# Patient Record
Sex: FEMALE | Race: AMERICAN INDIAN OR ALASKA NATIVE | NOT HISPANIC OR LATINO | Employment: OTHER | URBAN - METROPOLITAN AREA
[De-identification: names, ages, dates, MRNs, and addresses within clinical notes are randomized per-mention and may not be internally consistent; named-entity substitution may affect disease eponyms.]

---

## 2017-01-06 ENCOUNTER — APPOINTMENT (OUTPATIENT)
Dept: PULMONOLOGY | Facility: HOSPICE | Age: 47
End: 2017-01-06
Payer: MEDICARE

## 2017-01-13 ENCOUNTER — OFFICE VISIT (OUTPATIENT)
Dept: PULMONOLOGY | Facility: HOSPICE | Age: 47
End: 2017-01-13
Payer: MEDICARE

## 2017-01-13 VITALS
HEIGHT: 62 IN | BODY MASS INDEX: 32.76 KG/M2 | OXYGEN SATURATION: 96 % | WEIGHT: 178 LBS | HEART RATE: 71 BPM | RESPIRATION RATE: 16 BRPM | SYSTOLIC BLOOD PRESSURE: 102 MMHG | DIASTOLIC BLOOD PRESSURE: 70 MMHG | TEMPERATURE: 97.7 F

## 2017-01-13 DIAGNOSIS — J45.20 MILD INTERMITTENT ASTHMA WITHOUT COMPLICATION: ICD-10-CM

## 2017-01-13 PROCEDURE — 99213 OFFICE O/P EST LOW 20 MIN: CPT | Performed by: NURSE PRACTITIONER

## 2017-01-13 NOTE — PATIENT INSTRUCTIONS
1. CT chest for further evaluation of groundglass opacities. If groundglass continues then would warrant hypersensitivity pneumonitis workup.  2. Pulmonary function tests.  3. Continue Dulera 200/5 µg 2 inhalations twice daily with spacer, rinse mouth thoroughly after use, Proventil and albuterol nebulized treatments.  4. Check IgE and eosinophil count to see if she is a candidate for Xolair or nNucala.  5. Follow-up with lab results CT and pulmonary function test.

## 2017-01-13 NOTE — MR AVS SNAPSHOT
"        Gina Scott   2017 3:20 PM   Office Visit   MRN: [id number]    Department:  [facility]   Dept Phone:  [phone]    Description:  Female : 1970   Provider:  DAVID Ricci           Reason for Visit     Follow-Up Last seen 6/15      Allergies as of 2017     Allergen Noted Reactions    [place] 2015   Swelling    Mouth swells up      Darvocet [Propoxyphene N-Apap] 2009   Anaphylaxis    Hctz [Hydrochlorothiazide] 2015       Pcn [Penicillins] 2014         You were diagnosed with     Mild intermittent asthma without complication   [645957]         Vital Signs     Blood Pressure Pulse Temperature Respirations Height Weight    102/70 mmHg 71 36.5 °C (97.7 °F) 16 1.575 m (5' 2\") 80.74 kg (178 lb)    Body Mass Index Oxygen Saturation Last Menstrual Period Smoking Status          32.55 kg/m2 96% 1998 Former Smoker        Basic Information     Date Of Birth Sex Race Ethnicity Preferred Language    1970 Female  or  Non- English      Your appointments     Mar 09, 2017  1:00 PM   Pulmonary Function Test with [id number]   [facility] (--)    [address]  [address]  [place] [id number] [phone]            Mar 09, 2017  2:20 PM   Established Patient Pul with DAVID Ricci   [facility] (--)    [address]  [address]  [place] [id number] [phone]              Problem List              ICD-10-CM Priority Class Noted - Resolved    Hypothyroid E03.9   2009 - Present    Chronic back pain M54.9, G89.29   2009 - Present    ENDOMETRIOSIS    2009 - Present    Graves' disease E05.00   2009 - Present    Vitamin d deficiency    2009 - Present    Fatty liver K76.0   3/31/2011 - Present    Pancreatitis, acute K85.90   2013 - Present    Opiate addiction (CMS-HCC) F11.20   7/10/2013 - Present    Vertigo R42   2014 - " Present    Mild intermittent asthma without complication J45.20   1/13/2017 - Present      Health Maintenance        Date Due Completion Dates    IMM DTaP/Tdap/Td Vaccine (1 - Tdap) 7/5/1991 ---    PAP SMEAR 7/5/1993 ---    MAMMOGRAM 12/17/2013 12/17/2012, 10/19/2011, 11/24/2004    IMM INFLUENZA (1) 9/1/2016 1/23/2014            Current Immunizations     Influenza TIV (IM) 1/23/2014  1:01 PM    Pneumococcal polysaccharide vaccine (PPSV-23) 7/10/2013  4:00 AM      Below and/or attached are the medications your provider expects you to take. Review all of your home medications and newly ordered medications with your provider and/or pharmacist. Follow medication instructions as directed by your provider and/or pharmacist. Please keep your medication list with you and share with your provider. Update the information when medications are discontinued, doses are changed, or new medications (including over-the-counter products) are added; and carry medication information at all times in the event of emergency situations     Allergies:  WALNUT - Swelling     DARVOCET - Anaphylaxis     HCTZ - (reactions not documented)     PCN - (reactions not documented)               Medications  Valid as of: January 13, 2017 -  3:57 PM    Generic Name Brand Name Tablet Size Instructions for use    Albuterol Sulfate (Aero Soln) albuterol 108 (90 BASE) MCG/ACT Inhale 2 Puffs by mouth every 6 hours as needed for Shortness of Breath.        Levothyroxine Sodium (Tab) SYNTHROID 112 MCG Take 112 mcg by mouth every morning before breakfast. 125 mcg        Mometasone Furo-Formoterol Fum (Aerosol) Mometasone Furo-Formoterol Fum 200-5 MCG/ACT Inhale 200 mcg by mouth 4 times a day.        Oxycodone-Acetaminophen (Tab) PERCOCET-10  MG Take 1-2 Tabs by mouth every 6 hours as needed for Severe Pain.        Tiotropium Bromide Monohydrate (Aero Soln) Tiotropium Bromide Monohydrate 1.25 MCG/ACT Inhale 2 Inhalation by mouth every day.        .                   Medicines prescribed today were sent to:     [id number] - [place], [place] - [address]    [address] [place] [address]    Phone: [phone] Fax: [phone]    Open 24 Hours?: No      Medication refill instructions:       If your prescription bottle indicates you have medication refills left, it is not necessary to call your provider’s office. Please contact your pharmacy and they will refill your medication.    If your prescription bottle indicates you do not have any refills left, you may request refills at any time through one of the following ways: The online American[employer] system (except Urgent Care), by calling your provider’s office, or by asking your pharmacy to contact your provider’s office with a refill request. Medication refills are processed only during regular business hours and may not be available until the next business day. Your provider may request additional information or to have a follow-up visit with you prior to refilling your medication.   *Please Note: Medication refills are assigned a new Rx number when refilled electronically. Your pharmacy may indicate that no refills were authorized even though a new prescription for the same medication is available at the pharmacy. Please request the medicine by name with the pharmacy before contacting your provider for a refill.        Your To Do List     Future Labs/Procedures Complete By Expires    AMB PULMONARY FUNCTION TEST/LAB  As directed 1/13/2018    CBC WITH DIFFERENTIAL  As directed 1/13/2018    CT-CHEST (THORAX) W/O  As directed 1/13/2018    IGE SERUM  As directed 1/13/2018         Cartour[employer] Access Code: Activation code not generated  Current [employer] Status: Active

## 2017-01-13 NOTE — PROGRESS NOTES
Chief Complaint   Patient presents with   • Follow-Up     Last seen 6/15         HPI:  This is a 46 y.o. female with a history of asthma. Spirometry from 2015 indicates FEV1 2.76 L, 96% predicted, FEV1 4/FVC 84%, and FEF 25-75% 110% predicted. The patient is compliant with Dulera 200/5 µg 2 inhalations twice daily and Proventil upwards of 8 times per day. Since August 2016 she has noticed significant increase in nocturnal symptoms including difficulty sleeping due to shortness of breath, hoarse voice, and copious amounts of phlegm. She has occasional wheezing. She denies fevers, chills, sweats. July 2016 she was hospitalized for chest pain. A chest x-ray was performed indicating a possible basilar opacity this CT was performed. CT dated 7/31/16 indicates mild dependent groundglass capacities likely representing atelectasis but could be pneumonitis. There is also mild lingular and right middle lobe atelectasis. Patient reports no exposure to birds, chemicals, hot tubs, smoke, or adrian environment such as Mining. She quit smoking in July 2014.    Past Medical History   Diagnosis Date   • Chronic back pain 11/24/2009   • ENDOMETRIOSIS 11/24/2009   • Hypothyroid 11/24/2009   • Grave's disease 11/24/2009   • Vitamin d deficiency 11/24/2009   • Hypothyroid    • Asthma    • Kidney stones    • Spinal cord stimulator status    • Bronchitis    • Osteoporosis    • PND (post-nasal drip)    • Seizure disorder (CMS-HCC)    • Mild intermittent asthma without complication 1/13/2017       Past Surgical History   Procedure Laterality Date   • Other orthopedic surgery       fusion   • Abdominal hysterectomy total       ovarian  cyst endometriosis   • Appendectomy     • Laminotomy       fusion L5       Social History   Substance Use Topics   • Smoking status: Former Smoker -- 0.15 packs/day for 22 years     Types: Cigarettes     Quit date: 07/16/2014   • Smokeless tobacco: Never Used      Comment: smoked pack and half a week in august  "of 2009   • Alcohol Use: 0.0 oz/week     0 Standard drinks or equivalent per week      Comment: Hard liquor occ       ROS:   Constitutional: Denies fevers, chills, sweats, fatigue, and weight loss.  Eyes: Denies glasses.  Ears/nose/mouth/throat: Denies injury.  Cardiovascular: Denies chest pain, tightness.  Respiratory: See history of present illness.  GI: Denies heartburn, difficulty swallowing, nausea, and vomiting.  Neurological: Denies frequent headaches, dizziness, weakness.    Vitals:  Filed Vitals:    01/13/17 1516   Height: 1.575 m (5' 2\")   Weight: 80.74 kg (178 lb)   Weight % change since last entry.: 0 %   BP: 102/70   Pulse: 71   BMI (Calculated): 32.56   Resp: 16   Temp: 36.5 °C (97.7 °F)   O2 sat % room air: 96 %       Allergies:  Holcomb; Darvocet; Hctz; and Pcn    Medications:  Current Outpatient Prescriptions   Medication Sig Dispense Refill   • Tiotropium Bromide Monohydrate (SPIRIVA RESPIMAT) 1.25 MCG/ACT Aero Soln Inhale 2 Inhalation by mouth every day. 1 Inhaler 5   • Mometasone Furo-Formoterol Fum 200-5 MCG/ACT Aerosol Inhale 200 mcg by mouth 4 times a day.     • albuterol (VENTOLIN OR PROVENTIL) 108 (90 BASE) MCG/ACT AERS inhalation aerosol Inhale 2 Puffs by mouth every 6 hours as needed for Shortness of Breath. 8.5 g 3   • levothyroxine (SYNTHROID) 112 MCG TABS Take 112 mcg by mouth every morning before breakfast. 125 mcg     • Oxycodone-Acetaminophen (PERCOCET)  MG Tab Take 1-2 Tabs by mouth every 6 hours as needed for Severe Pain. (Patient not taking: Reported on 1/13/2017) 20 Tab 0     No current facility-administered medications for this visit.       PHYSICAL EXAM:  Appearance: Well-developed, well-nourished, no acute distress.  Eyes. PERRL.  Hearing: Grossly intact.  Oropharynx: Tongue normal, posterior pharynx without erythema or exudate.  Respiratory effort: No intercostal retractions or use of accessory muscles.  Lung auscultation: No crackles, wheezing.  Heart auscultation: No " murmur, gallop, or rub. Regular rate and rhythm.  Extremities: No cyanosis or edema.  Gait and Station: Normal  Orientation: Oriented to time, place, and person.    Assessment:  1. Mild intermittent asthma without complication  AMB PULMONARY FUNCTION TEST/LAB    CT-CHEST (THORAX) W/O    Tiotropium Bromide Monohydrate (SPIRIVA RESPIMAT) 1.25 MCG/ACT Aero Soln    IGE SERUM    CBC WITH DIFFERENTIAL         Plan:  1. CT chest for further evaluation of groundglass opacities. If groundglass continues then would warrant hypersensitivity pneumonitis workup.  2. Pulmonary function tests.  3. Continue Dulera 200/5 µg 2 inhalations twice daily with spacer, rinse mouth thoroughly after use, Proventil and albuterol nebulized treatments.  4. Check IgE and eosinophil count to see if she is a candidate for Xolair or nNucala.  5. Follow-up with lab results CT and pulmonary function test.    Return in about 6 weeks (around 2/24/2017) for With results, With ANDREZ Begum.

## 2017-01-19 ENCOUNTER — HOSPITAL ENCOUNTER (OUTPATIENT)
Dept: RADIOLOGY | Facility: MEDICAL CENTER | Age: 47
End: 2017-01-19
Attending: NURSE PRACTITIONER
Payer: MEDICARE

## 2017-01-19 ENCOUNTER — HOSPITAL ENCOUNTER (OUTPATIENT)
Dept: LAB | Facility: MEDICAL CENTER | Age: 47
End: 2017-01-19
Attending: NURSE PRACTITIONER
Payer: MEDICARE

## 2017-01-19 DIAGNOSIS — J45.20 MILD INTERMITTENT ASTHMA WITHOUT COMPLICATION: ICD-10-CM

## 2017-01-19 LAB
BASOPHILS # BLD AUTO: 0.04 K/UL (ref 0–0.12)
BASOPHILS NFR BLD AUTO: 0.6 % (ref 0–1.8)
EOSINOPHIL # BLD: 0.15 K/UL (ref 0–0.51)
EOSINOPHIL NFR BLD AUTO: 2.3 % (ref 0–6.9)
ERYTHROCYTE [DISTWIDTH] IN BLOOD BY AUTOMATED COUNT: 40.4 FL (ref 35.9–50)
HCT VFR BLD AUTO: 43.8 % (ref 37–47)
HGB BLD-MCNC: 15 G/DL (ref 12–16)
IMM GRANULOCYTES # BLD AUTO: 0.02 K/UL (ref 0–0.11)
IMM GRANULOCYTES NFR BLD AUTO: 0.3 % (ref 0–0.9)
LYMPHOCYTES # BLD: 2.08 K/UL (ref 1–4.8)
LYMPHOCYTES NFR BLD AUTO: 32.1 % (ref 22–41)
MCH RBC QN AUTO: 30.4 PG (ref 27–33)
MCHC RBC AUTO-ENTMCNC: 34.2 G/DL (ref 33.6–35)
MCV RBC AUTO: 88.8 FL (ref 81.4–97.8)
MONOCYTES # BLD: 0.2 K/UL (ref 0–0.85)
MONOCYTES NFR BLD AUTO: 3.1 % (ref 0–13.4)
NEUTROPHILS # BLD: 3.98 K/UL (ref 2–7.15)
NEUTROPHILS NFR BLD AUTO: 61.6 % (ref 44–72)
NRBC # BLD AUTO: 0 K/UL
NRBC BLD-RTO: 0 /100 WBC
PLATELET # BLD AUTO: 242 K/UL (ref 164–446)
PMV BLD AUTO: 10.8 FL (ref 9–12.9)
RBC # BLD AUTO: 4.93 M/UL (ref 4.2–5.4)
WBC # BLD AUTO: 6.5 K/UL (ref 4.8–10.8)

## 2017-01-19 PROCEDURE — 71250 CT THORAX DX C-: CPT

## 2017-01-20 ENCOUNTER — HOSPITAL ENCOUNTER (EMERGENCY)
Facility: MEDICAL CENTER | Age: 47
End: 2017-01-20
Attending: EMERGENCY MEDICINE
Payer: MEDICARE

## 2017-01-20 VITALS
TEMPERATURE: 98.2 F | BODY MASS INDEX: 33.13 KG/M2 | HEART RATE: 100 BPM | WEIGHT: 180 LBS | RESPIRATION RATE: 16 BRPM | SYSTOLIC BLOOD PRESSURE: 115 MMHG | DIASTOLIC BLOOD PRESSURE: 74 MMHG | HEIGHT: 62 IN | OXYGEN SATURATION: 93 %

## 2017-01-20 DIAGNOSIS — Z79.899 MEDICAL MARIJUANA USE: ICD-10-CM

## 2017-01-20 DIAGNOSIS — T40.711A OVERDOSE OF MARIJUANA, ACCIDENTAL OR UNINTENTIONAL, INITIAL ENCOUNTER: ICD-10-CM

## 2017-01-20 LAB
ALBUMIN SERPL BCP-MCNC: 4.6 G/DL (ref 3.2–4.9)
ALBUMIN/GLOB SERPL: 1.4 G/DL
ALP SERPL-CCNC: 77 U/L (ref 30–99)
ALT SERPL-CCNC: 15 U/L (ref 2–50)
AMPHET UR QL SCN: NEGATIVE
ANION GAP SERPL CALC-SCNC: 7 MMOL/L (ref 0–11.9)
AST SERPL-CCNC: 13 U/L (ref 12–45)
BARBITURATES UR QL SCN: NEGATIVE
BASOPHILS # BLD AUTO: 0.3 % (ref 0–1.8)
BASOPHILS # BLD: 0.02 K/UL (ref 0–0.12)
BENZODIAZ UR QL SCN: NEGATIVE
BILIRUB SERPL-MCNC: 0.5 MG/DL (ref 0.1–1.5)
BUN SERPL-MCNC: 11 MG/DL (ref 8–22)
BZE UR QL SCN: NEGATIVE
CALCIUM SERPL-MCNC: 9.7 MG/DL (ref 8.5–10.5)
CANNABINOIDS UR QL SCN: POSITIVE
CHLORIDE SERPL-SCNC: 104 MMOL/L (ref 96–112)
CO2 SERPL-SCNC: 26 MMOL/L (ref 20–33)
CREAT SERPL-MCNC: 0.63 MG/DL (ref 0.5–1.4)
EOSINOPHIL # BLD AUTO: 0.05 K/UL (ref 0–0.51)
EOSINOPHIL NFR BLD: 0.7 % (ref 0–6.9)
ERYTHROCYTE [DISTWIDTH] IN BLOOD BY AUTOMATED COUNT: 38.6 FL (ref 35.9–50)
ETHANOL BLD-MCNC: 0 G/DL
GFR SERPL CREATININE-BSD FRML MDRD: >60 ML/MIN/1.73 M 2
GLOBULIN SER CALC-MCNC: 3.2 G/DL (ref 1.9–3.5)
GLUCOSE SERPL-MCNC: 125 MG/DL (ref 65–99)
HCT VFR BLD AUTO: 45.4 % (ref 37–47)
HGB BLD-MCNC: 14.9 G/DL (ref 12–16)
IMM GRANULOCYTES # BLD AUTO: 0.03 K/UL (ref 0–0.11)
IMM GRANULOCYTES NFR BLD AUTO: 0.4 % (ref 0–0.9)
LYMPHOCYTES # BLD AUTO: 1.34 K/UL (ref 1–4.8)
LYMPHOCYTES NFR BLD: 18.9 % (ref 22–41)
MCH RBC QN AUTO: 28.8 PG (ref 27–33)
MCHC RBC AUTO-ENTMCNC: 32.8 G/DL (ref 33.6–35)
MCV RBC AUTO: 87.8 FL (ref 81.4–97.8)
MDMA UR QL SCN: NEGATIVE
METHADONE UR QL SCN: NEGATIVE
MONOCYTES # BLD AUTO: 0.3 K/UL (ref 0–0.85)
MONOCYTES NFR BLD AUTO: 4.2 % (ref 0–13.4)
NEUTROPHILS # BLD AUTO: 5.34 K/UL (ref 2–7.15)
NEUTROPHILS NFR BLD: 75.5 % (ref 44–72)
NRBC # BLD AUTO: 0 K/UL
NRBC BLD AUTO-RTO: 0 /100 WBC
OPIATES UR QL SCN: NEGATIVE
OXYCODONE UR QL SCN: NEGATIVE
PCP UR QL SCN: NEGATIVE
PLATELET # BLD AUTO: 244 K/UL (ref 164–446)
PMV BLD AUTO: 10.1 FL (ref 9–12.9)
POTASSIUM SERPL-SCNC: 4 MMOL/L (ref 3.6–5.5)
PROLACTIN SERPL-MCNC: 6.13 NG/ML (ref 2.8–26)
PROPOXYPH UR QL SCN: NEGATIVE
PROT SERPL-MCNC: 7.8 G/DL (ref 6–8.2)
RBC # BLD AUTO: 5.17 M/UL (ref 4.2–5.4)
SODIUM SERPL-SCNC: 137 MMOL/L (ref 135–145)
WBC # BLD AUTO: 7.1 K/UL (ref 4.8–10.8)

## 2017-01-20 PROCEDURE — 80053 COMPREHEN METABOLIC PANEL: CPT

## 2017-01-20 PROCEDURE — 84146 ASSAY OF PROLACTIN: CPT

## 2017-01-20 PROCEDURE — 99284 EMERGENCY DEPT VISIT MOD MDM: CPT

## 2017-01-20 PROCEDURE — 85025 COMPLETE CBC W/AUTO DIFF WBC: CPT

## 2017-01-20 PROCEDURE — 80307 DRUG TEST PRSMV CHEM ANLYZR: CPT

## 2017-01-20 ASSESSMENT — LIFESTYLE VARIABLES: DO YOU DRINK ALCOHOL: NO

## 2017-01-20 ASSESSMENT — PAIN SCALES - GENERAL: PAINLEVEL_OUTOF10: 8

## 2017-01-20 NOTE — ED PROVIDER NOTES
"ED Provider Note    Scribed for Sergio Moy M.D. by Hoa Suarez. 1/20/2017, 3:41 PM.    Primary care provider: Masoud Fletcher M.D.  Means of arrival: Ambulance  History obtained from: Patient  History limited by: None    CHIEF COMPLAINT  Chief Complaint   Patient presents with   • Seizure       HPI  Gina Scott is a 46 y.o. female who presents to the Emergency Department brought in by ambulance status post seizure like activity, onset a couple of hours ago. The patient states that she ate \"edible\" marijuana for the first time today at the same time that she took her Percocet and the symptoms started shortly after. Patient states that she \"messed up\" noting she took one of her Percocets as well. She apparently is out of Percocet. And decided to \"go green\". Patient states she felt extremely dizzy after taking the marijuana and subsequently became extremely dizzy. Patient then felt like she may have had an asthma attack.    Of interest patient notes that she's been evaluated by neurology in the past and was diagnosed with \"pseudoseizures\".    She knows she awoke in the ambulance. Patient believes \"I think I did\" call the ambulance. She was started on Spiriva last week.    History of chronic back pain. Had a nerve stimulator placed at age 16. Has had multiple failed back surgeries. No significant back pain at this time.    Review of old medical records shows previous history of opiate dependence. Patient apparently checked in for asthma yesterday but was not seen. Patient was evaluated for acute chest pain in July of last year and admitted to the hospital. Last year patient was seen for flank pain, hematuria and acute UTI. Patient was evaluated in 2015 for epigastric pain on one occasion and right upper quadrant pain on another.    REVIEW OF SYSTEMS  ROS    PAST MEDICAL HISTORY   has a past medical history of Chronic back pain (11/24/2009); ENDOMETRIOSIS (11/24/2009); Hypothyroid " "(11/24/2009); Grave's disease (11/24/2009); Vitamin d deficiency (11/24/2009); Hypothyroid; Asthma; Kidney stones; Spinal cord stimulator status; Bronchitis; Osteoporosis; PND (post-nasal drip); Seizure disorder (CMS-HCC); and Mild intermittent asthma without complication (1/13/2017).    SURGICAL HISTORY   has past surgical history that includes other orthopedic surgery; abdominal hysterectomy total; appendectomy; and laminotomy.    SOCIAL HISTORY  Social History   Substance Use Topics   • Smoking status: Former Smoker -- 0.15 packs/day for 22 years     Types: Cigarettes     Quit date: 07/16/2014   • Smokeless tobacco: Never Used      Comment: smoked pack and half a week in august of 2009   • Alcohol Use: No      History   Drug Use   • Yes       FAMILY HISTORY  Family History   Problem Relation Age of Onset   • Hypertension Mother    • Diabetes Mother    • Hypertension Father    • Diabetes Father    • Cancer Maternal Aunt      aunt and cousin have breast cancer   • Genetic Neg Hx    • Lung Disease Neg Hx        CURRENT MEDICATIONS  Home Medications     Reviewed by Jose Hi R.N. (Registered Nurse) on 01/20/17 at 1530  Med List Status: Partial    Medication Last Dose Status    albuterol (VENTOLIN OR PROVENTIL) 108 (90 BASE) MCG/ACT AERS inhalation aerosol 1/19/2017 Active    levothyroxine (SYNTHROID) 112 MCG TABS 1/20/2017 Active    Mometasone Furo-Formoterol Fum 200-5 MCG/ACT Aerosol  Active    Oxycodone-Acetaminophen (PERCOCET)  MG Tab 1/19/2017 Active    Tiotropium Bromide Monohydrate (SPIRIVA RESPIMAT) 1.25 MCG/ACT Aero Soln 1/19/2017 Active                ALLERGIES  Allergies   Allergen Reactions   • [place] Swelling     Mouth swells up     • Darvocet [Propoxyphene N-Apap] Anaphylaxis   • Hctz [Hydrochlorothiazide]    • Pcn [Penicillins]        PHYSICAL EXAM  VITAL SIGNS: /74 mmHg  Pulse 90  Temp(Src) 36.8 °C (98.2 °F)  Resp 16  Ht 1.575 m (5' 2.01\")  Wt 81.647 kg (180 lb)  BMI 32.91 kg/m2  " SpO2 98%  LMP 01/01/1998    Constitutional: Well developed, Well nourished, No acute distress, Non-toxic appearance. Pleasant female patient does not appear to be in pain  HENT: Normocephalic, Atraumatic, Bilateral external ears normal, Oropharynx moist, no evidence of dehydration, No oral exudates, Nose normal.   Eyes: PERRLA, EOMI, Conjunctiva normal, No discharge.   Neck: Normal range of motion, No tenderness, Supple, No stridor. No masses. No evidence of meningitis or meningismus.   Lymphatic: No lymphadenopathy noted.   Cardiovascular: Normal heart rate, Normal rhythm, No murmurs, No rubs, No gallops.   Thorax & Lungs: Normal breath sounds, No respiratory distress, No wheezing or rhonchi, No chest tenderness.   Abdomen: Bowel sounds normal, Soft, No tenderness, No masses, No pulsatile masses. No guarding or rebound. No evidence of peritoneal findings.  Skin: Warm, Dry, No erythema, No rash. No exanthem.   Back: Minimal diffuse tenderness.   Extremities:  No edema, No tenderness, No cyanosis, No clubbing.   Musculoskeletal: Good range of motion in all major joints. No major deformities noted.   Neurologic: Alert & oriented x 3, Normal motor function, No focal deficits noted.   Psychiatric: Affect normal, mood normal.                                                              DIAGNOSTIC STUDIES / PROCEDURES    LABS  Results for orders placed or performed during the hospital encounter of 01/20/17   PROLACTIN   Result Value Ref Range    Prolactin 6.13 2.80 - 26.00 ng/mL   CBC WITH DIFFERENTIAL   Result Value Ref Range    WBC 7.1 4.8 - 10.8 K/uL    RBC 5.17 4.20 - 5.40 M/uL    Hemoglobin 14.9 12.0 - 16.0 g/dL    Hematocrit 45.4 37.0 - 47.0 %    MCV 87.8 81.4 - 97.8 fL    MCH 28.8 27.0 - 33.0 pg    MCHC 32.8 (L) 33.6 - 35.0 g/dL    RDW 38.6 35.9 - 50.0 fL    Platelet Count 244 164 - 446 K/uL    MPV 10.1 9.0 - 12.9 fL    Neutrophils-Polys 75.50 (H) 44.00 - 72.00 %    Lymphocytes 18.90 (L) 22.00 - 41.00 %     Monocytes 4.20 0.00 - 13.40 %    Eosinophils 0.70 0.00 - 6.90 %    Basophils 0.30 0.00 - 1.80 %    Immature Granulocytes 0.40 0.00 - 0.90 %    Nucleated RBC 0.00 /100 WBC    Neutrophils (Absolute) 5.34 2.00 - 7.15 K/uL    Lymphs (Absolute) 1.34 1.00 - 4.80 K/uL    Monos (Absolute) 0.30 0.00 - 0.85 K/uL    Eos (Absolute) 0.05 0.00 - 0.51 K/uL    Baso (Absolute) 0.02 0.00 - 0.12 K/uL    Immature Granulocytes (abs) 0.03 0.00 - 0.11 K/uL    NRBC (Absolute) 0.00 K/uL   COMP METABOLIC PANEL   Result Value Ref Range    Sodium 137 135 - 145 mmol/L    Potassium 4.0 3.6 - 5.5 mmol/L    Chloride 104 96 - 112 mmol/L    Co2 26 20 - 33 mmol/L    Anion Gap 7.0 0.0 - 11.9    Glucose 125 (H) 65 - 99 mg/dL    Bun 11 8 - 22 mg/dL    Creatinine 0.63 0.50 - 1.40 mg/dL    Calcium 9.7 8.5 - 10.5 mg/dL    AST(SGOT) 13 12 - 45 U/L    ALT(SGPT) 15 2 - 50 U/L    Alkaline Phosphatase 77 30 - 99 U/L    Total Bilirubin 0.5 0.1 - 1.5 mg/dL    Albumin 4.6 3.2 - 4.9 g/dL    Total Protein 7.8 6.0 - 8.2 g/dL    Globulin 3.2 1.9 - 3.5 g/dL    A-G Ratio 1.4 g/dL   DIAGNOSTIC ALCOHOL   Result Value Ref Range    Diagnostic Alcohol 0.00 0.00 g/dL   URINE DRUG SCREEN   Result Value Ref Range    Amphetamines Urine Negative Negative    Barbiturates Negative Negative    Benzodiazepines Negative Negative    Cocaine Metabolite Negative Negative    Methadone Negative Negative    Ecstasy Negative Negative    Opiates Negative Negative    Oxycodone Negative Negative    Phencyclidine -Pcp Negative Negative    Propoxyphene Negative Negative    Cannabinoid Metab Positive (A) Negative   ESTIMATED GFR   Result Value Ref Range    GFR If African American >60 >60 mL/min/1.73 m 2    GFR If Non African American >60 >60 mL/min/1.73 m 2    All labs reviewed by me.    COURSE & MEDICAL DECISION MAKING  Nursing notes, VS, PMSFHx reviewed in chart.    Review of old medical records shows outpatient evaluation of her asthma yesterday. Admission for chest pain in July of last year.  Previous evaluation for flank and abdominal pain.     3:41 PM - Patient seen and examined at bedside. Ordered prolactin, CBC with differential, CMP, diagnostic alcohol, and urine drug screen to evaluate her symptoms. The differential diagnoses include but are not limited to: Medication effect doubt seizure history of pseudoseizures    The patient will return for new or worsening symptoms and is stable at the time of discharge.    The patient is referred to a primary physician for blood pressure management, diabetic screening, and for all other preventative health concerns.    DISPOSITION:  Patient will be discharged home in stable condition.    FOLLOW UP:  No follow-up provider specified.    OUTPATIENT MEDICATIONS:  New Prescriptions    No medications on file      FINAL IMPRESSION  1. Medical marijuana use    2. Overdose of marijuana, accidental or unintentional, initial encounter    3. History of pseudoseizures       Hoa CABRERA (Scribe), am scribing for, and in the presence of, Sergio Moy M.D..    Electronically signed by: Hoa Suarez (Ebenezeribconstantin), 1/20/2017    Sergio CABRERA M.D. personally performed the services described in this documentation, as scribed by Hoa uSarez in my presence, and it is both accurate and complete.    The note accurately reflects work and decisions made by me.  Sergio Moy  1/20/2017  7:51 PM

## 2017-01-20 NOTE — ED AVS SNAPSHOT
[employer] Access Code: Activation code not generated  Current [employer] Status: Active    Connect HQ[employer]  A secure, online tool to manage your health information     [employer]’s [employer]® is a secure, online tool that connects you to your personalized health information from the privacy of your home -- day or night - making it very easy for you to manage your healthcare. Once the activation process is completed, you can even access your medical information using the [employer] shiva, which is available for free in the Apple Shiva store or Google Play store.     [employer] provides the following levels of access (as shown below):   My Chart Features   [facility] Primary Care Doctor [facility]  Specialists [facility]  Urgent  Care Non-[facility]  Primary Care  Doctor   Email your healthcare team securely and privately 24/7 X X X X   Manage appointments: schedule your next appointment; view details of past/upcoming appointments X      Request prescription refills. X      View recent personal medical records, including lab and immunizations X X X X   View health record, including health history, allergies, medications X X X X   Read reports about your outpatient visits, procedures, consult and ER notes X X X X   See your discharge summary, which is a recap of your hospital and/or ER visit that includes your diagnosis, lab results, and care plan. X X       How to register for [employer]:  1. Go to  [email or web address].  2. Click on the Sign Up Now box, which takes you to the New Member Sign Up page. You will need to provide the following information:  a. Enter your [employer] Access Code exactly as it appears at the top of this page. (You will not need to use this code after you’ve completed the sign-up process. If you do not sign up before the expiration date, you must request a new code.)   b. Enter your date of birth.   c. Enter your home email address.   d. Click Submit, and follow the next screen’s instructions.  3. Create a [employer] ID. This will  be your iAgree login ID and cannot be changed, so think of one that is secure and easy to remember.  4. Create a iAgree password. You can change your password at any time.  5. Enter your Password Reset Question and Answer. This can be used at a later time if you forget your password.   6. Enter your e-mail address. This allows you to receive e-mail notifications when new information is available in iAgree.  7. Click Sign Up. You can now view your health information.    For assistance activating your iAgree account, call [phone]

## 2017-01-20 NOTE — ED AVS SNAPSHOT
After Visit Summary                                                                                                                Gina Scott   MRN: [id number]    Department:  [facility], Emergency Dept   Date of Visit:  1/20/2017            [facility], Emergency Dept    [address]    [place] [address]    Phone:  [phone]      You were seen by     Sergio Moy M.D.      Your Diagnosis Was     Medical marijuana use     Z79.899       Medication Information     Review all of your home medications and newly ordered medications with your primary doctor and/or pharmacist as soon as possible. Follow medication instructions as directed by your doctor and/or pharmacist.     Please keep your complete medication list with you and share with your physician. Update the information when medications are discontinued, doses are changed, or new medications (including over-the-counter products) are added; and carry medication information at all times in the event of emergency situations.               Medication List      ASK your doctor about these medications        Instructions    albuterol 108 (90 BASE) MCG/ACT Aers inhalation aerosol    Inhale 2 Puffs by mouth every 6 hours as needed for Shortness of Breath.   Dose:  2 Puff       levothyroxine 112 MCG Tabs   Commonly known as:  SYNTHROID    Take 112 mcg by mouth every morning before breakfast. 125 mcg   Dose:  112 mcg       Mometasone Furo-Formoterol Fum 200-5 MCG/ACT Aero    Inhale 200 mcg by mouth 4 times a day.   Dose:  200 mcg       oxycodone-acetaminophen  MG Tabs   Commonly known as:  PERCOCET    Take 1-2 Tabs by mouth every 6 hours as needed for Severe Pain.   Dose:  1-2 Tab       Tiotropium Bromide Monohydrate 1.25 MCG/ACT Aers   Commonly known as:  SPIRIVA RESPIMAT    Inhale 2 Inhalation by mouth every day.   Dose:  2 Inhalation               Procedures and tests performed during your visit     CBC WITH DIFFERENTIAL    COMP METABOLIC PANEL    DIAGNOSTIC ALCOHOL    ESTIMATED GFR    PROLACTIN    URINE DRUG SCREEN        Discharge Instructions       Accidental Overdose  A drug overdose occurs when a chemical substance (drug or medication) is used in amounts large enough to overcome a person. This may result in severe illness or death. This is a type of poisoning. Accidental overdoses of medications or other substances come from a variety of reasons. When this happens accidentally, it is often because the person taking the substance does not know enough about what they have taken. Drugs which commonly cause overdose deaths are alcohol, psychotropic medications (medications which affect the mind), pain medications, illegal drugs (street drugs) such as cocaine and heroin, and multiple drugs taken at the same time. It may result from careless behavior (such as over-indulging at a party). Other causes of overdose may include multiple drug use, a lapse in memory, or drug use after a period of no drug use.   Sometimes overdosing occurs because a person cannot remember if they have taken their medication.   A common unintentional overdose in young children involves multi-vitamins containing iron. Iron is a part of the hemoglobin molecule in blood. It is used to transport oxygen to living cells. When taken in small amounts, iron allows the body to restock hemoglobin. In large amounts, it causes problems in the body. If this overdose is not treated, it can lead to death.  Never take medicines that show signs of tampering or do not seem quite right. Never take medicines in the dark or in poor lighting. Read the label and check each dose of medicine before you take it. When adults are poisoned, it happens most often through carelessness or lack of information. Taking medicines in the dark or taking medicine prescribed for someone else to treat the same type of problem is a dangerous practice.  SYMPTOMS   Symptoms of  "overdose depend on the medication and amount taken. They can vary from over-activity with stimulant over-dosage, to sleepiness from depressants such as alcohol, narcotics and tranquilizers. Confusion, dizziness, nausea and vomiting may be present. If problems are severe enough coma and death may result.  DIAGNOSIS   Diagnosis and management are generally straightforward if the drug is known. Otherwise it is more difficult. At times, certain symptoms and signs exhibited by the patient, or blood tests, can reveal the drug in question.   TREATMENT   In an emergency department, most patients can be treated with supportive measures. Antidotes may be available if there has been an overdose of opioids or benzodiazepines. A rapid improvement will often occur if this is the cause of overdose.  At home or away from medical care:  · There may be no immediate problems or warning signs in children.  · Not everything works well in all cases of poisoning.  · Take immediate action. Poisons may act quickly.  · If you think someone has swallowed medicine or a household product, and the person is unconscious, having seizures (convulsions), or is not breathing, immediately call for an ambulance.  IF a person is conscious and appears to be doing OK but has swallowed a poison:  · Do not wait to see what effect the poison will have. Immediately call a poison control center (listed in the white pages of your telephone book under \"Poison Control\" or inside the front cover with other emergency numbers). Some poison control centers have TTY capability for the deaf. Check with your local center if you or someone in your family requires this service.  · Keep the container so you can read the label on the product for ingredients.  · Describe what, when, and how much was taken and the age and condition of the person poisoned. Inform them if the person is vomiting, choking, drowsy, shows a change in color or temperature of skin, is conscious or " unconscious, or is convulsing.  · Do not cause vomiting unless instructed by medical personnel. Do not induce vomiting or force liquids into a person who is convulsing, unconscious, or very drowsy.  Stay calm and in control.   · Activated charcoal also is sometimes used in certain types of poisoning and you may wish to add a supply to your emergency medicines. It is available without a prescription. Call a poison control center before using this medication.  PREVENTION   Thousands of children die every year from unintentional poisoning. This may be from household chemicals, poisoning from carbon monoxide in a car, taking their parent's medications, or simply taking a few iron pills or vitamins with iron. Poisoning comes from unexpected sources.  · Store medicines out of the sight and reach of children, preferably in a locked cabinet. Do not keep medications in a food cabinet. Always store your medicines in a secure place. Get rid of  medications.  · If you have children living with you or have them as occasional guests, you should have child-resistant caps on your medicine containers. Keep everything out of reach. Child proof your home.  · If you are called to the telephone or to answer the door while you are taking a medicine, take the container with you or put the medicine out of the reach of small children.  · Do not take your medication in front of children. Do not tell your child how good a medication is and how good it is for them. They may get the idea it is more of a treat.  · If you are an adult and have accidentally taken an overdose, you need to consider how this happened and what can be done to prevent it from happening again. If this was from a street drug or alcohol, determine if there is a problem that needs addressing. If you are not sure a problems exists, it is easy to talk to a professional and ask them if they think you have a problem. It is better to handle this problem in this way before  it happens again and has a much worse consequence.     This information is not intended to replace advice given to you by your health care provider. Make sure you discuss any questions you have with your health care provider.     Document Released: 03/03/2006 Document Revised: 01/08/2016 Document Reviewed: 08/09/2010  [employer] Interactive Patient Education ©2016 [employer]  Marijuana Abuse  Your exam shows you have used marijuana or pot. There are many health problems related to marijuana abuse. These include:  · Bronchitis.  · Chronic cough.  · Emphysema.  · Lung and upper airway cancer.  Abusers also experience impairment in:  · Memory.  · Judgment.  · Ability to learn.  · Coordination.  Students who smoke marijuana:  · Get lower grades.  · Are less likely to graduate than those who do not.  Adults who abuse marijuana:  · Have problems at work.  · May even lose their jobs due to:  · Poor work performance.  · Absenteeism.  Attention, memory, and learning skills have been shown to be diminished for up to 6 months after stopping regular use, and there is evidence that the effects can be cumulative over a lifetime.   Heavier use of marijuana also puts a strain on relationships with friends and loved ones and can lead to moodiness and loss of confidence. Acute intoxication can lead to:  · Increased anxiety.  · A panic episode.  It also increases the risk for having an automobile accident. This is especially true if the pot is combined with alcohol or other intoxicants. Treatment for acute intoxication is rarely needed. However, medicine to reduce anxiety may be helpful in some people.  Millions of people are considered to be dependent on marijuana. It is long-term regular use that leads to addiction and all of its complex problems. Information on the problem of addiction and the health problems of long-term abuse is posted at the National [place] for Drug Abuse website, www.drugabuse.gov. Consult with your doctor  or counselor if you want further information and support in handling this common problem.  Document Released: 01/25/2006 Document Revised: 03/11/2013 Document Reviewed: 11/11/2008  ExitCare® Patient Information ©2014 [email or web address].            Patient Information     Patient Information    Following emergency treatment: all patient requiring follow-up care must return either to a private physician or a clinic if your condition worsens before you are able to obtain further medical attention, please return to the emergency room.     Billing Information    At [facility], we work to make the billing process streamlined for our patients.  Our Representatives are here to answer any questions you may have regarding your hospital bill.  If you have insurance coverage and have supplied your insurance information to us, we will submit a claim to your insurer on your behalf.  Should you have any questions regarding your bill, we can be reached online or by phone as follows:  Online: You are able pay your bills online or live chat with our representatives about any billing questions you may have. We are here to help Monday - Friday from 8:00am to 7:30pm and 9:00am - 12:00pm on Saturdays.  Please visit [email or web address]  for more information.   Phone:  [phone] or [phone]    Please note that your emergency physician, surgeon, pathologist, radiologist, anesthesiologist, and other specialists are not employed by [facility] and will therefore bill separately for their services.  Please contact them directly for any questions concerning their bills at the numbers below:     Emergency Physician Services:  [phone]  [facility]:  [phone]  [facility]:  [phone]  [facility]:  [phone]    1. Your final bill may vary from the amount quoted upon discharge if all procedures are not complete at that time, or if your doctor has  additional procedures of which we are not aware. You will receive an additional bill if you return to the Emergency Department at [facility] for suture removal regardless of the facility of which the sutures were placed.     2. Please arrange for settlement of this account at the emergency registration.    3. All self-pay accounts are due in full at the time of treatment.  If you are unable to meet this obligation then payment is expected within 4-5 days.     4. If you have had radiology studies (CT, X-ray, Ultrasound, MRI), you have received a preliminary result during your emergency department visit. Please contact the radiology department [phone] to receive a copy of your final result. Please discuss the Final result with your primary physician or with the follow up physician provided.     Crisis Hotline:  [place] Crisis Hotline:  0-055-ZIGFDXS or 1-740.510.9494  [place] Crisis Hotline:    1-413.153.2954 or 201-997-0643         ED Discharge Follow Up Questions    1. In order to provide you with very good care, we would like to follow up with a phone call in the next few days.  May we have your permission to contact you?     YES /  NO    2. What is the best phone number to call you? (       )_____-__________    3. What is the best time to call you?      Morning  /  Afternoon  /  Evening                   Patient Signature:  ____________________________________________________________    Date:  ____________________________________________________________      Your appointments     Mar 09, 2017  1:00 PM   Pulmonary Function Test with PFT-RM3   [facility] (--)    [address]  [address]  [place] [address]   [phone]            Mar 09, 2017  2:20 PM   Established Patient Pul with DAVID Ricci   [facility] (--)    [address]  [address]  [place] [id number] [phone]

## 2017-01-20 NOTE — ED NOTES
Pt presents via EMS with report of seizure activity earlier. Pt is A&O x4. Pt reports smoking marijuana for the first time today and taking her percocet at the same time. BG reported 92.

## 2017-01-21 LAB — IGE SERPL-ACNC: 26 KU/L

## 2017-01-21 NOTE — ED NOTES
"Lab at bedside to draw blood  Walked patient with x1 person assist and back to room, she stated \"I feel dizzy\"  Urine sample sent to lab   Gave patient portable phone to call family member   "

## 2017-01-21 NOTE — ED NOTES
Assist RN:  Pt provided with discharge instructions, instructions for follow up appointment with PCP, s/s of when to seek emergency care.  Pt verbalizes understanding.  Pt discharged in good condition.

## 2017-01-21 NOTE — ED NOTES
Patient walked to restroom with x1 person assist, on the way back to her room she was able to walk on her own staff only did a stand by assist.   Chart up for md for recheck

## 2017-01-21 NOTE — DISCHARGE INSTRUCTIONS
Accidental Overdose  A drug overdose occurs when a chemical substance (drug or medication) is used in amounts large enough to overcome a person. This may result in severe illness or death. This is a type of poisoning. Accidental overdoses of medications or other substances come from a variety of reasons. When this happens accidentally, it is often because the person taking the substance does not know enough about what they have taken. Drugs which commonly cause overdose deaths are alcohol, psychotropic medications (medications which affect the mind), pain medications, illegal drugs (street drugs) such as cocaine and heroin, and multiple drugs taken at the same time. It may result from careless behavior (such as over-indulging at a party). Other causes of overdose may include multiple drug use, a lapse in memory, or drug use after a period of no drug use.   Sometimes overdosing occurs because a person cannot remember if they have taken their medication.   A common unintentional overdose in young children involves multi-vitamins containing iron. Iron is a part of the hemoglobin molecule in blood. It is used to transport oxygen to living cells. When taken in small amounts, iron allows the body to restock hemoglobin. In large amounts, it causes problems in the body. If this overdose is not treated, it can lead to death.  Never take medicines that show signs of tampering or do not seem quite right. Never take medicines in the dark or in poor lighting. Read the label and check each dose of medicine before you take it. When adults are poisoned, it happens most often through carelessness or lack of information. Taking medicines in the dark or taking medicine prescribed for someone else to treat the same type of problem is a dangerous practice.  SYMPTOMS   Symptoms of overdose depend on the medication and amount taken. They can vary from over-activity with stimulant over-dosage, to sleepiness from depressants such as  "alcohol, narcotics and tranquilizers. Confusion, dizziness, nausea and vomiting may be present. If problems are severe enough coma and death may result.  DIAGNOSIS   Diagnosis and management are generally straightforward if the drug is known. Otherwise it is more difficult. At times, certain symptoms and signs exhibited by the patient, or blood tests, can reveal the drug in question.   TREATMENT   In an emergency department, most patients can be treated with supportive measures. Antidotes may be available if there has been an overdose of opioids or benzodiazepines. A rapid improvement will often occur if this is the cause of overdose.  At home or away from medical care:  · There may be no immediate problems or warning signs in children.  · Not everything works well in all cases of poisoning.  · Take immediate action. Poisons may act quickly.  · If you think someone has swallowed medicine or a household product, and the person is unconscious, having seizures (convulsions), or is not breathing, immediately call for an ambulance.  IF a person is conscious and appears to be doing OK but has swallowed a poison:  · Do not wait to see what effect the poison will have. Immediately call a poison control center (listed in the white pages of your telephone book under \"Poison Control\" or inside the front cover with other emergency numbers). Some poison control centers have TTY capability for the deaf. Check with your local center if you or someone in your family requires this service.  · Keep the container so you can read the label on the product for ingredients.  · Describe what, when, and how much was taken and the age and condition of the person poisoned. Inform them if the person is vomiting, choking, drowsy, shows a change in color or temperature of skin, is conscious or unconscious, or is convulsing.  · Do not cause vomiting unless instructed by medical personnel. Do not induce vomiting or force liquids into a person who " is convulsing, unconscious, or very drowsy.  Stay calm and in control.   · Activated charcoal also is sometimes used in certain types of poisoning and you may wish to add a supply to your emergency medicines. It is available without a prescription. Call a poison control center before using this medication.  PREVENTION   Thousands of children die every year from unintentional poisoning. This may be from household chemicals, poisoning from carbon monoxide in a car, taking their parent's medications, or simply taking a few iron pills or vitamins with iron. Poisoning comes from unexpected sources.  · Store medicines out of the sight and reach of children, preferably in a locked cabinet. Do not keep medications in a food cabinet. Always store your medicines in a secure place. Get rid of  medications.  · If you have children living with you or have them as occasional guests, you should have child-resistant caps on your medicine containers. Keep everything out of reach. Child proof your home.  · If you are called to the telephone or to answer the door while you are taking a medicine, take the container with you or put the medicine out of the reach of small children.  · Do not take your medication in front of children. Do not tell your child how good a medication is and how good it is for them. They may get the idea it is more of a treat.  · If you are an adult and have accidentally taken an overdose, you need to consider how this happened and what can be done to prevent it from happening again. If this was from a street drug or alcohol, determine if there is a problem that needs addressing. If you are not sure a problems exists, it is easy to talk to a professional and ask them if they think you have a problem. It is better to handle this problem in this way before it happens again and has a much worse consequence.     This information is not intended to replace advice given to you by your health care provider. Make  sure you discuss any questions you have with your health care provider.     Document Released: 03/03/2006 Document Revised: 01/08/2016 Document Reviewed: 08/09/2010  [employer] Interactive Patient Education ©2016 [employer]  Marijuana Abuse  Your exam shows you have used marijuana or pot. There are many health problems related to marijuana abuse. These include:  · Bronchitis.  · Chronic cough.  · Emphysema.  · Lung and upper airway cancer.  Abusers also experience impairment in:  · Memory.  · Judgment.  · Ability to learn.  · Coordination.  Students who smoke marijuana:  · Get lower grades.  · Are less likely to graduate than those who do not.  Adults who abuse marijuana:  · Have problems at work.  · May even lose their jobs due to:  · Poor work performance.  · Absenteeism.  Attention, memory, and learning skills have been shown to be diminished for up to 6 months after stopping regular use, and there is evidence that the effects can be cumulative over a lifetime.   Heavier use of marijuana also puts a strain on relationships with friends and loved ones and can lead to moodiness and loss of confidence. Acute intoxication can lead to:  · Increased anxiety.  · A panic episode.  It also increases the risk for having an automobile accident. This is especially true if the pot is combined with alcohol or other intoxicants. Treatment for acute intoxication is rarely needed. However, medicine to reduce anxiety may be helpful in some people.  Millions of people are considered to be dependent on marijuana. It is long-term regular use that leads to addiction and all of its complex problems. Information on the problem of addiction and the health problems of long-term abuse is posted at the National [place] for Drug Abuse website, www.drugabuse.gov. Consult with your doctor or counselor if you want further information and support in handling this common problem.  Document Released: 01/25/2006 Document Revised: 03/11/2013  Document Reviewed: 11/11/2008  ExitCare® Patient Information ©2014 [employer]

## 2017-02-07 ENCOUNTER — HOSPITAL ENCOUNTER (OUTPATIENT)
Dept: RADIOLOGY | Facility: MEDICAL CENTER | Age: 47
End: 2017-02-07
Attending: FAMILY MEDICINE
Payer: MEDICARE

## 2017-02-07 DIAGNOSIS — Z13.9 SCREENING: ICD-10-CM

## 2017-02-07 PROCEDURE — 77063 BREAST TOMOSYNTHESIS BI: CPT

## 2017-03-09 ENCOUNTER — OFFICE VISIT (OUTPATIENT)
Dept: PULMONOLOGY | Facility: HOSPICE | Age: 47
End: 2017-03-09
Payer: MEDICARE

## 2017-03-09 ENCOUNTER — APPOINTMENT (OUTPATIENT)
Dept: PULMONOLOGY | Facility: HOSPICE | Age: 47
End: 2017-03-09
Payer: MEDICARE

## 2017-03-09 VITALS
WEIGHT: 181 LBS | TEMPERATURE: 99 F | RESPIRATION RATE: 16 BRPM | DIASTOLIC BLOOD PRESSURE: 82 MMHG | HEIGHT: 62 IN | OXYGEN SATURATION: 94 % | HEART RATE: 100 BPM | BODY MASS INDEX: 33.31 KG/M2 | SYSTOLIC BLOOD PRESSURE: 118 MMHG

## 2017-03-09 DIAGNOSIS — R09.82 POST-NASAL DRIP: ICD-10-CM

## 2017-03-09 PROCEDURE — G8432 DEP SCR NOT DOC, RNG: HCPCS | Performed by: NURSE PRACTITIONER

## 2017-03-09 PROCEDURE — 1036F TOBACCO NON-USER: CPT | Performed by: NURSE PRACTITIONER

## 2017-03-09 PROCEDURE — 99213 OFFICE O/P EST LOW 20 MIN: CPT | Performed by: NURSE PRACTITIONER

## 2017-03-09 PROCEDURE — G8484 FLU IMMUNIZE NO ADMIN: HCPCS | Performed by: NURSE PRACTITIONER

## 2017-03-09 PROCEDURE — G8419 CALC BMI OUT NRM PARAM NOF/U: HCPCS | Performed by: NURSE PRACTITIONER

## 2017-03-09 RX ORDER — METHYLPREDNISOLONE 4 MG/1
TABLET ORAL
Qty: 21 TAB | Refills: 0 | Status: SHIPPED | OUTPATIENT
Start: 2017-03-09 | End: 2018-01-22

## 2017-03-09 RX ORDER — AZELASTINE HCL 205.5 UG/1
1 SPRAY NASAL 2 TIMES DAILY
Qty: 1 ML | Refills: 5 | Status: SHIPPED | OUTPATIENT
Start: 2017-03-09 | End: 2018-01-22

## 2017-03-09 NOTE — PATIENT INSTRUCTIONS
1. Continue Dulera 200/5 µg 2 inhalations twice daily, Spiriva 2.5 µg daily, and Proventil and albuterol nebulized treatments as needed.  2. Increased nebulized treatments 3 times daily until feeling better.  3. Omeprazole 30 minutes prior to breakfast.  4. Azelastine  one spray each nostril 1-2 times daily.  5. Guaifenesin/Mucinex or chest congestion.  6. Continue doxycycline.  7. Medrol Dosepak.

## 2017-03-09 NOTE — MR AVS SNAPSHOT
"        Gina Scott   3/9/2017 2:20 PM   Office Visit   MRN: [id number]    Department:  [facility]   Dept Phone:  [phone]    Description:  Female : 1970   Provider:  DAVID Ricci           Reason for Visit     Asthma Ct Scan Results/ No PFT per pt due to illness      Allergies as of 3/9/2017     Allergen Noted Reactions    [place] 2015   Swelling    Mouth swells up      Darvocet [Propoxyphene N-Apap] 2009   Anaphylaxis    Hctz [Hydrochlorothiazide] 2015       Pcn [Penicillins] 2014         You were diagnosed with     Post-nasal drip   [[id number]]         Vital Signs     Blood Pressure Pulse Temperature Respirations Height Weight    118/82 mmHg 100 37.2 °C (99 °F) 16 1.575 m (5' 2\") 82.101 kg (181 lb)    Body Mass Index Oxygen Saturation Last Menstrual Period Smoking Status          33.10 kg/m2 94% 1998 Former Smoker        Basic Information     Date Of Birth Sex Race Ethnicity Preferred Language    1970 Female  or  Non- English      Your appointments     Mar 09, 2017  2:20 PM   Established Patient Pul with DAVID Ricci   [facility] (--)    [address]  [address]  [place] [address] [phone]            2017  9:20 AM   Established Patient Pul with DAVID Ricci   [facility] (--)    [address]  [address]  [place] [address]   [phone]              Problem List              ICD-10-CM Priority Class Noted - Resolved    Hypothyroid E03.9   2009 - Present    Chronic back pain M54.9, G89.29   2009 - Present    ENDOMETRIOSIS    2009 - Present    Graves' disease E05.00   2009 - Present    Vitamin d deficiency    2009 - Present    Fatty liver K76.0   3/31/2011 - Present    Pancreatitis, acute K85.90   2013 - Present    Opiate addiction (CMS-HCC) F11.20   7/10/2013 - Present    Vertigo R42   " 1/22/2014 - Present    Mild intermittent asthma without complication J45.20   1/13/2017 - Present      Health Maintenance        Date Due Completion Dates    IMM DTaP/Tdap/Td Vaccine (1 - Tdap) 7/5/1991 ---    PAP SMEAR 7/5/1993 ---    IMM INFLUENZA (1) 9/1/2016 1/23/2014    MAMMOGRAM 2/7/2018 2/7/2017, 12/17/2012, 10/19/2011, 11/24/2004            Current Immunizations     Influenza TIV (IM) 1/23/2014  1:01 PM    Pneumococcal polysaccharide vaccine (PPSV-23) 7/10/2013  4:00 AM      Below and/or attached are the medications your provider expects you to take. Review all of your home medications and newly ordered medications with your provider and/or pharmacist. Follow medication instructions as directed by your provider and/or pharmacist. Please keep your medication list with you and share with your provider. Update the information when medications are discontinued, doses are changed, or new medications (including over-the-counter products) are added; and carry medication information at all times in the event of emergency situations     Allergies:  WALNUT - Swelling     DARVOCET - Anaphylaxis     HCTZ - (reactions not documented)     PCN - (reactions not documented)               Medications  Valid as of: March 09, 2017 -  2:00 PM    Generic Name Brand Name Tablet Size Instructions for use    Albuterol Sulfate (Aero Soln) albuterol 108 (90 BASE) MCG/ACT Inhale 2 Puffs by mouth every 6 hours as needed for Shortness of Breath.        Azelastine HCl (Solution) Azelastine HCl 0.15 % Spray 1 Spray in nose 2 Times a Day. 1 to 2 sprays as needed.        Levothyroxine Sodium (Tab) SYNTHROID 112 MCG Take 112 mcg by mouth every morning before breakfast. 125 mcg        MethylPREDNISolone (Tablet Therapy Pack) MEDROL DOSEPAK 4 MG Take as directed.        Mometasone Furo-Formoterol Fum (Aerosol) Mometasone Furo-Formoterol Fum 200-5 MCG/ACT Inhale 200 mcg by mouth 4 times a day.        Oxycodone-Acetaminophen (Tab) PERCOCET-10   MG Take 1-2 Tabs by mouth every 6 hours as needed for Severe Pain.        Tiotropium Bromide Monohydrate (Aero Soln) Tiotropium Bromide Monohydrate 1.25 MCG/ACT Inhale 2 Inhalation by mouth every day.        .                 Medicines prescribed today were sent to:     [id number] - [place], [place] - [address]    [address] [facility] [place] [address]    Phone: [phone] Fax: [phone]    Open 24 Hours?: No      Medication refill instructions:       If your prescription bottle indicates you have medication refills left, it is not necessary to call your provider’s office. Please contact your pharmacy and they will refill your medication.    If your prescription bottle indicates you do not have any refills left, you may request refills at any time through one of the following ways: The online [employer] system (except Urgent Care), by calling your provider’s office, or by asking your pharmacy to contact your provider’s office with a refill request. Medication refills are processed only during regular business hours and may not be available until the next business day. Your provider may request additional information or to have a follow-up visit with you prior to refilling your medication.   *Please Note: Medication refills are assigned a new Rx number when refilled electronically. Your pharmacy may indicate that no refills were authorized even though a new prescription for the same medication is available at the pharmacy. Please request the medicine by name with the pharmacy before contacting your provider for a refill.        Instructions    1. Continue Dulera 200/5 µg 2 inhalations twice daily, Spiriva 2.5 µg daily, and Proventil and albuterol nebulized treatments as needed.  2. Increased nebulized treatments 3 times daily until feeling better.  3. Omeprazole 30 minutes prior to breakfast.  4. Azelastine  one spray each nostril 1-2 times daily.  5. Guaifenesin/Mucinex or chest congestion.  6.  Continue doxycycline.  7. Medrol Dosepak.            MyChart Access Code: Activation code not generated  Current [employer] Status: Active

## 2017-03-09 NOTE — PROGRESS NOTES
Chief Complaint   Patient presents with   • Asthma     Ct Scan Results/ No PFT per pt due to illness         HPI:  This is a 46 y.o. female with a history of Spirometry from 2015 indicates FEV1 2.76 L, 96% predicted, FEV1 4/FVC 84%, and FEF 25-75% 110% predicted. The patient is compliant with Dulera 200/5 µg 2 inhalations twice daily and Proventil as needed. A chest x-ray was performed indicating a possible basilar opacity this CT was performed. CT dated 7/31/16 indicates mild dependent groundglass capacities likely representing atelectasis but could be pneumonitis. There is also mild lingular and right middle lobe atelectasis. Follow-up CT dated 1/19/17 indicates near complete resolution of poorly defined opacifications posteriorly and bilaterally. There is possible scar versus atelectasis in the lingular segment of the left upper lobe and right middle lobe are unchanged. Patient reports no exposure to birds, chemicals, hot tubs, smoke, or adrian environment such as Mining. She quit smoking in July 2014. IgE is normal at 26 kU/L and absolute eosinophil count is 0.15K/mL. The patient has been sick for several months.     She comes in today feeling unwell. She since sick for over 2 weeks. She has a cough which is starting to be productive of purulent phlegm. She is more short of breath and wheezing. She has congestion, low-grade fevers, chills and sweats. She is currently on doxycycline and has 3 days left. She does not feel she has improved. She has pain in her right side whenever she sneezes or coughs.      Past Medical History   Diagnosis Date   • Chronic back pain 11/24/2009   • ENDOMETRIOSIS 11/24/2009   • Hypothyroid 11/24/2009   • Grave's disease 11/24/2009   • Vitamin d deficiency 11/24/2009   • Hypothyroid    • Asthma    • Kidney stones    • Spinal cord stimulator status    • Bronchitis    • Osteoporosis    • PND (post-nasal drip)    • Seizure disorder (CMS-HCC)    • Mild intermittent asthma without  "complication 1/13/2017       Past Surgical History   Procedure Laterality Date   • Other orthopedic surgery       fusion   • Abdominal hysterectomy total       ovarian  cyst endometriosis   • Appendectomy     • Laminotomy       fusion L5       Social History   Substance Use Topics   • Smoking status: Former Smoker -- 0.15 packs/day for 22 years     Types: Cigarettes     Quit date: 07/16/2014   • Smokeless tobacco: Never Used      Comment: smoked pack and half a week in august of 2009   • Alcohol Use: No       ROS:   Constitutional: Denies fevers, chills, sweats, fatigue, and weight loss.  Eyes: Denies glasses.  Ears/nose/mouth/throat: Denies injury.  Cardiovascular: Denies chest pain, tightness.  Respiratory: See history of present illness.  GI: Denies heartburn, difficulty swallowing, nausea, and vomiting.  Neurological: Denies frequent headaches, dizziness, weakness.    Vitals:  Filed Vitals:    03/09/17 1321   Height: 1.575 m (5' 2\")   Weight: 82.101 kg (181 lb)   Weight % change since last entry.: 0 %   BP: 118/82   Pulse: 100   BMI (Calculated): 33.11   Resp: 16   Temp: 37.2 °C (99 °F)   O2 sat % room air: 94 %       Allergies:  [place]; Darvocet; Hctz; and Pcn    Medications:  Current Outpatient Prescriptions   Medication Sig Dispense Refill   • Azelastine HCl 0.15 % Solution Spray 1 Spray in nose 2 Times a Day. 1 to 2 sprays as needed. 1 mL 5   • MethylPREDNISolone (MEDROL DOSEPAK) 4 MG Tablet Therapy Pack Take as directed. 21 Tab 0   • Tiotropium Bromide Monohydrate (SPIRIVA RESPIMAT) 1.25 MCG/ACT Aero Soln Inhale 2 Inhalation by mouth every day. 1 Inhaler 5   • Mometasone Furo-Formoterol Fum 200-5 MCG/ACT Aerosol Inhale 200 mcg by mouth 4 times a day.     • albuterol (VENTOLIN OR PROVENTIL) 108 (90 BASE) MCG/ACT AERS inhalation aerosol Inhale 2 Puffs by mouth every 6 hours as needed for Shortness of Breath. 8.5 g 3   • levothyroxine (SYNTHROID) 112 MCG TABS Take 112 mcg by mouth every morning before " breakfast. 125 mcg     • Oxycodone-Acetaminophen (PERCOCET)  MG Tab Take 1-2 Tabs by mouth every 6 hours as needed for Severe Pain. (Patient not taking: Reported on 1/13/2017) 20 Tab 0     No current facility-administered medications for this visit.       PHYSICAL EXAM:  Appearance: Well-developed, well-nourished, no acute distress.  Eyes. PERRL.  Hearing: Grossly intact.  Oropharynx: Tongue normal, posterior pharynx without erythema or exudate.  Respiratory effort: No intercostal retractions or use of accessory muscles.  Lung auscultation: No crackles, wheezing.  Heart auscultation: No murmur, gallop, or rub. Regular rate and rhythm.  Extremities: No cyanosis or edema.  Gait and Station: Normal  Orientation: Oriented to time, place, and person.    Assessment:  1. Post-nasal drip  Azelastine HCl 0.15 % Solution    MethylPREDNISolone (MEDROL DOSEPAK) 4 MG Tablet Therapy Pack         Plan:  1. Continue Dulera 200/5 µg 2 inhalations twice daily, Spiriva 2.5 µg daily, and Proventil and albuterol nebulized treatments as needed.  2. Increased nebulized treatments 3 times daily until feeling better.  3. Omeprazole 30 minutes prior to breakfast.  4. Azelastine  one spray each nostril 1-2 times daily.  5. Guaifenesin/Mucinex or chest congestion.  6. Continue doxycycline.  7. Medrol Dosepak.    Return in about 3 months (around 6/9/2017).

## 2017-04-04 ENCOUNTER — HOSPITAL ENCOUNTER (EMERGENCY)
Dept: HOSPITAL 8 - ED | Age: 47
Discharge: HOME | End: 2017-04-04
Payer: MEDICARE

## 2017-04-04 VITALS — HEIGHT: 62 IN | WEIGHT: 187.39 LBS | BODY MASS INDEX: 34.48 KG/M2

## 2017-04-04 VITALS — SYSTOLIC BLOOD PRESSURE: 108 MMHG | DIASTOLIC BLOOD PRESSURE: 62 MMHG

## 2017-04-04 DIAGNOSIS — G89.29: ICD-10-CM

## 2017-04-04 DIAGNOSIS — Z87.442: ICD-10-CM

## 2017-04-04 DIAGNOSIS — N20.2: Primary | ICD-10-CM

## 2017-04-04 DIAGNOSIS — J45.909: ICD-10-CM

## 2017-04-04 DIAGNOSIS — M54.9: ICD-10-CM

## 2017-04-04 DIAGNOSIS — E03.9: ICD-10-CM

## 2017-04-04 LAB
AST SERPL-CCNC: 9 U/L (ref 15–37)
BUN SERPL-MCNC: 9 MG/DL (ref 7–18)
HGB BLD-MCNC: 14.8 G/DL (ref 11.7–16.4)
PATH.CAST-FLAG: (no result)
SPERM-FLAG: (no result)
SRC-FLAG: (no result)
XTAL-FLAG: (no result)
YLC-FLAG: (no result)

## 2017-04-04 PROCEDURE — 83690 ASSAY OF LIPASE: CPT

## 2017-04-04 PROCEDURE — 82040 ASSAY OF SERUM ALBUMIN: CPT

## 2017-04-04 PROCEDURE — 71010: CPT

## 2017-04-04 PROCEDURE — 80076 HEPATIC FUNCTION PANEL: CPT

## 2017-04-04 PROCEDURE — 76770 US EXAM ABDO BACK WALL COMP: CPT

## 2017-04-04 PROCEDURE — 96361 HYDRATE IV INFUSION ADD-ON: CPT

## 2017-04-04 PROCEDURE — 36415 COLL VENOUS BLD VENIPUNCTURE: CPT

## 2017-04-04 PROCEDURE — 99285 EMERGENCY DEPT VISIT HI MDM: CPT

## 2017-04-04 PROCEDURE — 85025 COMPLETE CBC W/AUTO DIFF WBC: CPT

## 2017-04-04 PROCEDURE — 96375 TX/PRO/DX INJ NEW DRUG ADDON: CPT

## 2017-04-04 PROCEDURE — 87086 URINE CULTURE/COLONY COUNT: CPT

## 2017-04-04 PROCEDURE — 81001 URINALYSIS AUTO W/SCOPE: CPT

## 2017-04-04 PROCEDURE — 74000: CPT

## 2017-04-04 PROCEDURE — 96374 THER/PROPH/DIAG INJ IV PUSH: CPT

## 2017-04-04 PROCEDURE — 80048 BASIC METABOLIC PNL TOTAL CA: CPT

## 2017-05-02 ENCOUNTER — NON-PROVIDER VISIT (OUTPATIENT)
Dept: URGENT CARE | Facility: PHYSICIAN GROUP | Age: 47
End: 2017-05-02
Payer: MEDICARE

## 2017-05-02 VITALS
WEIGHT: 181 LBS | HEART RATE: 74 BPM | TEMPERATURE: 97 F | BODY MASS INDEX: 33.31 KG/M2 | OXYGEN SATURATION: 95 % | DIASTOLIC BLOOD PRESSURE: 80 MMHG | SYSTOLIC BLOOD PRESSURE: 130 MMHG | HEIGHT: 62 IN

## 2017-05-02 DIAGNOSIS — S83.92XA SPRAIN OF LEFT KNEE, UNSPECIFIED LIGAMENT, INITIAL ENCOUNTER: ICD-10-CM

## 2017-05-02 PROCEDURE — 99214 OFFICE O/P EST MOD 30 MIN: CPT | Performed by: PHYSICIAN ASSISTANT

## 2017-05-02 RX ORDER — IBUPROFEN 800 MG/1
800 TABLET ORAL EVERY 8 HOURS PRN
Qty: 30 TAB | Refills: 0 | Status: SHIPPED | OUTPATIENT
Start: 2017-05-02 | End: 2021-01-07

## 2017-05-02 RX ORDER — LEVOTHYROXINE SODIUM 112 UG/1
112 TABLET ORAL
COMMUNITY

## 2017-05-02 NOTE — MR AVS SNAPSHOT
"        Gina Scott   2017 9:55 AM   Non-Provider Visit   MRN: [id number]    Department:  [facility]   Dept Phone:  [phone]    Description:  Female : 1970   Provider:  Mariam Fuller PA-C           Reason for Visit     Knee Pain (L) knee pain x 1 wk       Allergies as of 2017     Allergen Noted Reactions    [place] 2015   Swelling    Mouth swells up      Darvocet [Propoxyphene N-Apap] 2009   Anaphylaxis    Hctz [Hydrochlorothiazide] 2015       Pcn [Penicillins] 2014         You were diagnosed with     Sprain of left knee, unspecified ligament, initial encounter   [[id number]]         Vital Signs     Blood Pressure Pulse Temperature Height Weight Body Mass Index    130/80 mmHg 74 36.1 °C (97 °F) 1.575 m (5' 2\") 82.101 kg (181 lb) 33.10 kg/m2    Oxygen Saturation Last Menstrual Period Smoking Status             95% 1998 Former Smoker         Basic Information     Date Of Birth Sex Race Ethnicity Preferred Language    1970 Female  or  Non- English      Your appointments     2017  9:20 AM   Established Patient Pul with DAVID Ricci   [facility] (--)    [address] [place] [id number] [phone]              Problem List              ICD-10-CM Priority Class Noted - Resolved    Hypothyroid E03.9   2009 - Present    Chronic back pain M54.9, G89.29   2009 - Present    ENDOMETRIOSIS    2009 - Present    Graves' disease E05.00   2009 - Present    Vitamin d deficiency    2009 - Present    Fatty liver K76.0   3/31/2011 - Present    Pancreatitis, acute K85.90   2013 - Present    Opiate addiction (CMS-[facility]) F11.20   7/10/2013 - Present    Vertigo R42   2014 - Present    Mild intermittent asthma without complication J45.20   2017 - Present      Health Maintenance        Date Due Completion Dates    IMM DTaP/Tdap/Td Vaccine " (1 - Tdap) 7/5/1991 ---    PAP SMEAR 7/5/1993 ---    MAMMOGRAM 2/7/2018 2/7/2017, 12/17/2012, 10/19/2011, 11/24/2004            Current Immunizations     Influenza TIV (IM) 1/23/2014  1:01 PM    Pneumococcal polysaccharide vaccine (PPSV-23) 7/10/2013  4:00 AM      Below and/or attached are the medications your provider expects you to take. Review all of your home medications and newly ordered medications with your provider and/or pharmacist. Follow medication instructions as directed by your provider and/or pharmacist. Please keep your medication list with you and share with your provider. Update the information when medications are discontinued, doses are changed, or new medications (including over-the-counter products) are added; and carry medication information at all times in the event of emergency situations     Allergies:  WALNUT - Swelling     DARVOCET - Anaphylaxis     HCTZ - (reactions not documented)     PCN - (reactions not documented)               Medications  Valid as of: May 02, 2017 - 10:14 AM    Generic Name Brand Name Tablet Size Instructions for use    Albuterol Sulfate (Aero Soln) albuterol 108 (90 BASE) MCG/ACT Inhale 2 Puffs by mouth every 6 hours as needed for Shortness of Breath.        Azelastine HCl (Solution) Azelastine HCl 0.15 % Spray 1 Spray in nose 2 Times a Day. 1 to 2 sprays as needed.        Ibuprofen (Tab) MOTRIN 800 MG Take 1 Tab by mouth every 8 hours as needed for Mild Pain or Moderate Pain.        Levothyroxine Sodium (Tab) SYNTHROID 112 MCG Take 112 mcg by mouth every morning before breakfast. 125 mcg        Levothyroxine Sodium (Tab) SYNTHROID 112 MCG Take 112 mcg by mouth.        MethylPREDNISolone (Tablet Therapy Pack) MEDROL DOSEPAK 4 MG Take as directed.        Mometasone Furo-Formoterol Fum (Aerosol) Mometasone Furo-Formoterol Fum 200-5 MCG/ACT Inhale 200 mcg by mouth 4 times a day.        Oxycodone-Acetaminophen (Tab) PERCOCET-10  MG Take 1-2 Tabs by mouth every 6  hours as needed for Severe Pain.        Tiotropium Bromide Monohydrate (Aero Soln) Tiotropium Bromide Monohydrate 1.25 MCG/ACT Inhale 2 Inhalation by mouth every day.        .                 Medicines prescribed today were sent to:     [id number] - [place], [place] - [address]    [address] [facility] [place] [address]    Phone: [phone] Fax: [phone]    Open 24 Hours?: No      Medication refill instructions:       If your prescription bottle indicates you have medication refills left, it is not necessary to call your provider’s office. Please contact your pharmacy and they will refill your medication.    If your prescription bottle indicates you do not have any refills left, you may request refills at any time through one of the following ways: The online [employer] system (except Urgent Care), by calling your provider’s office, or by asking your pharmacy to contact your provider’s office with a refill request. Medication refills are processed only during regular business hours and may not be available until the next business day. Your provider may request additional information or to have a follow-up visit with you prior to refilling your medication.   *Please Note: Medication refills are assigned a new Rx number when refilled electronically. Your pharmacy may indicate that no refills were authorized even though a new prescription for the same medication is available at the pharmacy. Please request the medicine by name with the pharmacy before contacting your provider for a refill.        Referral     A referral request has been sent to our patient care coordination department. Please allow 3-5 business days for us to process this request and contact you either by phone or mail. If you do not hear from us by the 5th business day, please call us at [phone].           [employer] Access Code: Activation code not generated  Current [employer] Status: Active

## 2017-05-02 NOTE — PROGRESS NOTES
Chief Complaint   Patient presents with   • Knee Pain     (L) knee pain x 1 wk        HISTORY OF PRESENT ILLNESS: Patient is a 46 y.o. female who presents today for the following:    Left knee pain x 1 week  No injury  H/o the same on 2-3 separate occasions  Denies distal paresthesias since the knee pain started  Decreased ROM d/t pain  Hasn't tried much OTC medication  Worsening pain with ambulation    Patient Active Problem List    Diagnosis Date Noted   • Mild intermittent asthma without complication 01/13/2017   • Vertigo 01/22/2014   • Opiate addiction (CMS-[facility]) 07/10/2013   • Pancreatitis, acute 07/09/2013   • Fatty liver 03/31/2011   • Hypothyroid 11/24/2009   • Chronic back pain 11/24/2009   • ENDOMETRIOSIS 11/24/2009   • Graves' disease 11/24/2009   • Vitamin d deficiency 11/24/2009       Allergies:[place]; Darvocet; Hctz; and Pcn    Current Outpatient Prescriptions Ordered in [facility]   Medication Sig Dispense Refill   • levothyroxine (SYNTHROID) 112 MCG Tab Take 112 mcg by mouth.     • ibuprofen (MOTRIN) 800 MG Tab Take 1 Tab by mouth every 8 hours as needed for Mild Pain or Moderate Pain. 30 Tab 0   • Tiotropium Bromide Monohydrate (SPIRIVA RESPIMAT) 1.25 MCG/ACT Aero Soln Inhale 2 Inhalation by mouth every day. 1 Inhaler 5   • Oxycodone-Acetaminophen (PERCOCET)  MG Tab Take 1-2 Tabs by mouth every 6 hours as needed for Severe Pain. 20 Tab 0   • albuterol (VENTOLIN OR PROVENTIL) 108 (90 BASE) MCG/ACT AERS inhalation aerosol Inhale 2 Puffs by mouth every 6 hours as needed for Shortness of Breath. 8.5 g 3   • levothyroxine (SYNTHROID) 112 MCG TABS Take 112 mcg by mouth every morning before breakfast. 125 mcg     • Azelastine HCl 0.15 % Solution Spray 1 Spray in nose 2 Times a Day. 1 to 2 sprays as needed. 1 mL 5   • MethylPREDNISolone (MEDROL DOSEPAK) 4 MG Tablet Therapy Pack Take as directed. 21 Tab 0   • Mometasone Furo-Formoterol Fum 200-5 MCG/ACT Aerosol Inhale 200 mcg by mouth 4 times a day.       No  "current [facility]-ordered facility-administered medications on file.       Past Medical History   Diagnosis Date   • Chronic back pain 11/24/2009   • ENDOMETRIOSIS 11/24/2009   • Hypothyroid 11/24/2009   • Grave's disease 11/24/2009   • Vitamin d deficiency 11/24/2009   • Hypothyroid    • Asthma    • Kidney stones    • Spinal cord stimulator status    • Bronchitis    • Osteoporosis    • PND (post-nasal drip)    • Seizure disorder (CMS-HCC)    • Mild intermittent asthma without complication 1/13/2017       Social History   Substance Use Topics   • Smoking status: Former Smoker -- 0.15 packs/day for 22 years     Types: Cigarettes     Quit date: 07/16/2014   • Smokeless tobacco: Never Used      Comment: smoked pack and half a week in august of 2009   • Alcohol Use: No       Family Status   Relation Status Death Age   • Mother Alive    • Father Alive      Family History   Problem Relation Age of Onset   • Hypertension Mother    • Diabetes Mother    • Hypertension Father    • Diabetes Father    • Cancer Maternal Aunt      aunt and cousin have breast cancer   • Genetic Neg Hx    • Lung Disease Neg Hx        ROS:   Review of Systems   Constitutional: Negative for fever, chills, weight loss and malaise/fatigue.   HENT: Negative for ear pain, nosebleeds, congestion, sore throat and neck pain.    Eyes: Negative for blurred vision.   Respiratory: Negative for cough, sputum production, shortness of breath and wheezing.    Cardiovascular: Negative for chest pain, palpitations, orthopnea and leg swelling.   Gastrointestinal: Negative for heartburn, nausea, vomiting and abdominal pain.   Genitourinary: Negative for dysuria, urgency and frequency.       Exam:  Blood pressure 130/80, pulse 74, temperature 36.1 °C (97 °F), height 1.575 m (5' 2\"), weight 82.101 kg (181 lb), last menstrual period 01/01/1998, SpO2 95 %.  General: Normal appearing. No distress.  HEENT: Head is grossly normal.  Pulmonary: No respiratory distress " noted.  Cardiovascular: Left pedal pulse is strong.  Neurologic: No obvious sensory deficit.  Extremities: Decreased ROM left knee. Generalized TTP. Limited exam due to pain. No STS or joint effusion noted. NO erythema/ecchymosis noted.  Skin: No obvious lesions.  Psych: Normal mood. Alert and oriented x3. Judgment and insight is normal.    Assessment/Plan:  Use all medication as directed. Referring to PT for persistent symptoms more than 3-4 weeks. Follow up with PCP. Activity as tolerated.   1. Sprain of left knee, unspecified ligament, initial encounter  REFERRAL TO PHYSICAL THERAPY Reason for Therapy: Eval/Treat/Report    ibuprofen (MOTRIN) 800 MG Tab

## 2017-05-09 ENCOUNTER — HOSPITAL ENCOUNTER (OUTPATIENT)
Dept: RADIOLOGY | Facility: MEDICAL CENTER | Age: 47
End: 2017-05-09
Attending: PHYSICIAN ASSISTANT
Payer: MEDICARE

## 2017-05-09 DIAGNOSIS — M25.562 LEFT KNEE PAIN, UNSPECIFIED CHRONICITY: ICD-10-CM

## 2017-05-09 PROCEDURE — 73564 X-RAY EXAM KNEE 4 OR MORE: CPT | Mod: LT

## 2017-06-14 ENCOUNTER — APPOINTMENT (OUTPATIENT)
Dept: PHYSICAL THERAPY | Facility: REHABILITATION | Age: 47
End: 2017-06-14
Payer: MEDICARE

## 2017-06-29 ENCOUNTER — HOSPITAL ENCOUNTER (EMERGENCY)
Facility: MEDICAL CENTER | Age: 47
End: 2017-06-30
Attending: EMERGENCY MEDICINE
Payer: MEDICARE

## 2017-06-29 DIAGNOSIS — R10.13 EPIGASTRIC PAIN: ICD-10-CM

## 2017-06-29 DIAGNOSIS — R07.9 CHEST PAIN, UNSPECIFIED TYPE: ICD-10-CM

## 2017-06-29 LAB — EKG IMPRESSION: NORMAL

## 2017-06-29 PROCEDURE — 99284 EMERGENCY DEPT VISIT MOD MDM: CPT

## 2017-06-29 ASSESSMENT — PAIN SCALES - GENERAL: PAINLEVEL_OUTOF10: 6

## 2017-06-29 NOTE — ED AVS SNAPSHOT
Quick2LAUNCH Access Code: Activation code not generated  Current Quick2LAUNCH Status: Active    [employer]  A secure, online tool to manage your health information     [facility]’s Quick2LAUNCH® is a secure, online tool that connects you to your personalized health information from the privacy of your home -- day or night - making it very easy for you to manage your healthcare. Once the activation process is completed, you can even access your medical information using the Quick2LAUNCH shiva, which is available for free in the Apple Shiva store or Google Play store.     Quick2LAUNCH provides the following levels of access (as shown below):   My Chart Features   [facility] Primary Care Doctor [facility]  Specialists [facility]  Urgent  Care Non-[facility]  Primary Care  Doctor   Email your healthcare team securely and privately 24/7 X X X X   Manage appointments: schedule your next appointment; view details of past/upcoming appointments X      Request prescription refills. X      View recent personal medical records, including lab and immunizations X X X X   View health record, including health history, allergies, medications X X X X   Read reports about your outpatient visits, procedures, consult and ER notes X X X X   See your discharge summary, which is a recap of your hospital and/or ER visit that includes your diagnosis, lab results, and care plan. X X       How to register for Quick2LAUNCH:  1. Go to  [email or web address].  2. Click on the Sign Up Now box, which takes you to the New Member Sign Up page. You will need to provide the following information:  a. Enter your Quick2LAUNCH Access Code exactly as it appears at the top of this page. (You will not need to use this code after you’ve completed the sign-up process. If you do not sign up before the expiration date, you must request a new code.)   b. Enter your date of birth.   c. Enter your home email address.   d. Click Submit, and follow the next screen’s instructions.  3. Create a Quick2LAUNCH ID. This will  be your uniRow login ID and cannot be changed, so think of one that is secure and easy to remember.  4. Create a uniRow password. You can change your password at any time.  5. Enter your Password Reset Question and Answer. This can be used at a later time if you forget your password.   6. Enter your e-mail address. This allows you to receive e-mail notifications when new information is available in uniRow.  7. Click Sign Up. You can now view your health information.    For assistance activating your uniRow account, call [phone]

## 2017-06-29 NOTE — ED AVS SNAPSHOT
Home Care Instructions                                                                                                                Gina Scott   MRN: [id number]    Department:  [facility], Emergency Dept   Date of Visit:  6/29/2017            [facility], Emergency Dept    [address] [address]    Phone:  [phone]      You were seen by     Ye Carrasco M.D.      Your Diagnosis Was     Epigastric pain     R10.13       These are the medications you received during your hospitalization from 06/29/2017 2352 to 06/30/2017 0332     Date/Time Order Dose Route Action    06/30/2017 0047 HYDROmorphone (DILAUDID) injection 1 mg 1 mg Intravenous Given    06/30/2017 0047 ondansetron (ZOFRAN) syringe/vial injection 4 mg 4 mg Intravenous Given    06/30/2017 0133 iohexol (OMNIPAQUE) 350 mg/mL 100 mL Intravenous Given    06/30/2017 0223 hyoscyamine-maalox plus-lidocaine viscous (GI COCKTAIL) oral susp 30 mL 30 mL Oral Given      Follow-up Information     1. Follow up with Your Physician. Schedule an appointment as soon as possible for a visit in 3 days.    Specialty:  Emergency Medicine    Contact information    Varies          2. Follow up with [facility].    Why:  If you keep having this pain you may need to have a scope to look for ulcers.     Contact information    [facility] [address] [phone]          3. Follow up with [facility].    Why:  If you need a doctor    Contact information    [address]  [facility] [address] [phone]          4. Follow up with [facility].    Why:  If you need a doctor    Contact information    [address] [address] [phone]      Medication Information     Review all of your home medications and newly ordered medications with your primary doctor and/or pharmacist as soon as possible. Follow medication instructions as directed by your doctor and/or pharmacist.        Please keep your complete medication list with you and share with your physician. Update the information when medications are discontinued, doses are changed, or new medications (including over-the-counter products) are added; and carry medication information at all times in the event of emergency situations.               Medication List      START taking these medications        Instructions    Morning Afternoon Evening Bedtime    omeprazole 20 MG delayed-release capsule   Commonly known as:  PRILOSEC        Take 1 Cap by mouth 2 times a day for 30 days.   Dose:  20 mg                        sucralfate 1 GM/10ML Susp   Commonly known as:  CARAFATE        Take 10 mL by mouth 4 times a day for 30 days.   Dose:  1 g                          ASK your doctor about these medications        Instructions    Morning Afternoon Evening Bedtime    albuterol 108 (90 BASE) MCG/ACT Aers inhalation aerosol        Inhale 2 Puffs by mouth every 6 hours as needed for Shortness of Breath.   Dose:  2 Puff                        Azelastine HCl 0.15 % Soln        Doctor's comments:  1 bottle   Spray 1 Spray in nose 2 Times a Day. 1 to 2 sprays as needed.   Dose:  1 Spray                        ibuprofen 800 MG Tabs   Commonly known as:  MOTRIN        Take 1 Tab by mouth every 8 hours as needed for Mild Pain or Moderate Pain.   Dose:  800 mg                        * levothyroxine 112 MCG Tabs   Commonly known as:  SYNTHROID        Take 112 mcg by mouth.   Dose:  112 mcg                        * levothyroxine 112 MCG Tabs   Commonly known as:  SYNTHROID        Take 112 mcg by mouth every morning before breakfast. 125 mcg   Dose:  112 mcg                        MethylPREDNISolone 4 MG Tbpk   Commonly known as:  MEDROL DOSEPAK        Take as directed.                        Mometasone Furo-Formoterol Fum 200-5 MCG/ACT Aero        Inhale 200 mcg by mouth 4 times a day.   Dose:  200 mcg                        oxycodone-acetaminophen   MG Tabs   Commonly known as:  PERCOCET        Take 1-2 Tabs by mouth every 6 hours as needed for Severe Pain.   Dose:  1-2 Tab                        Tiotropium Bromide Monohydrate 1.25 MCG/ACT Aers   Commonly known as:  SPIRIVA RESPIMAT        Inhale 2 Inhalation by mouth every day.   Dose:  2 Inhalation                        * Notice:  This list has 2 medication(s) that are the same as other medications prescribed for you. Read the directions carefully, and ask your doctor or other care provider to review them with you.         Where to Get Your Medications      These medications were sent to [id number] - [place] - [address]  [address] [place] [address]     Phone:  [phone]    - omeprazole 20 MG delayed-release capsule  - sucralfate 1 GM/10ML Susp            Procedures and tests performed during your visit     Procedure/Test Number of Times Performed    APTT 1    Btype Natriuretic Peptide 1    CBC with Differential 1    CONSENT FOR CONTRAST INJECTION 2    CT-CTA COMPLETE THORACOABDOMINAL AORTA 1    Cardiac Monitoring 2    Complete Metabolic Panel (CMP) 1    DX-CHEST-LIMITED (1 VIEW) 1    EKG (ER) 1    EKG (NOW) 1    ESTIMATED GFR 1    IV Saline Lock 1    Lipase 1    Maintain O2 sats greater than 94% 1    Obtain Old EKG 1    Oxygen Therapy per Protocol 1    Prothrombin Time 1    Pulse Ox 1    Saline Lock 1    TROPONIN 1    Troponin 1        Discharge Instructions       Return if you have new or different chest pain, fever, vomiting blood, or pass out.   Avoid NSAIDS, and alcohol.     Gastritis, Adult  Gastritis is soreness and swelling (inflammation) of the lining of the stomach. Gastritis can develop as a sudden onset (acute) or long-term (chronic) condition. If gastritis is not treated, it can lead to stomach bleeding and ulcers.  CAUSES   Gastritis occurs when the stomach lining is weak or damaged. Digestive juices from the stomach then inflame the weakened stomach  lining. The stomach lining may be weak or damaged due to viral or bacterial infections. One common bacterial infection is the Helicobacter pylori infection. Gastritis can also result from excessive alcohol consumption, taking certain medicines, or having too much acid in the stomach.   SYMPTOMS   In some cases, there are no symptoms. When symptoms are present, they may include:  · Pain or a burning sensation in the upper abdomen.  · Nausea.  · Vomiting.  · An uncomfortable feeling of fullness after eating.  DIAGNOSIS   Your caregiver may suspect you have gastritis based on your symptoms and a physical exam. To determine the cause of your gastritis, your caregiver may perform the following:  · Blood or stool tests to check for the H pylori bacterium.  · Gastroscopy. A thin, flexible tube (endoscope) is passed down the esophagus and into the stomach. The endoscope has a light and camera on the end. Your caregiver uses the endoscope to view the inside of the stomach.  · Taking a tissue sample (biopsy) from the stomach to examine under a microscope.  TREATMENT   Depending on the cause of your gastritis, medicines may be prescribed. If you have a bacterial infection, such as an H pylori infection, antibiotics may be given. If your gastritis is caused by too much acid in the stomach, H2 blockers or antacids may be given. Your caregiver may recommend that you stop taking aspirin, ibuprofen, or other nonsteroidal anti-inflammatory drugs (NSAIDs).  HOME CARE INSTRUCTIONS  · Only take over-the-counter or prescription medicines as directed by your caregiver.  · If you were given antibiotic medicines, take them as directed. Finish them even if you start to feel better.  · Drink enough fluids to keep your urine clear or pale yellow.  · Avoid foods and drinks that make your symptoms worse, such as:  ¨ Caffeine or alcoholic drinks.  ¨ Chocolate.  ¨ Peppermint or mint flavorings.  ¨ Garlic and onions.  ¨ Spicy foods.  ¨ Citrus  fruits, such as oranges, denise, or limes.  ¨ Tomato-based foods such as sauce, chili, salsa, and pizza.  ¨ Fried and fatty foods.  · Eat small, frequent meals instead of large meals.  SEEK IMMEDIATE MEDICAL CARE IF:   · You have black or dark red stools.  · You vomit blood or material that looks like coffee grounds.  · You are unable to keep fluids down.  · Your abdominal pain gets worse.  · You have a fever.  · You do not feel better after 1 week.  · You have any other questions or concerns.  MAKE SURE YOU:  · Understand these instructions.  · Will watch your condition.  · Will get help right away if you are not doing well or get worse.     This information is not intended to replace advice given to you by your health care provider. Make sure you discuss any questions you have with your health care provider.     Document Released: 12/12/2002 Document Revised: 06/18/2013 Document Reviewed: 01/30/2013  [employer] Interactive Patient Education ©2016 Elsevier Inc.      Chest Pain, Nonspecific  It is often hard to give a specific diagnosis for the cause of chest pain. There is always a chance that your pain could be related to something serious, like a heart attack or a blood clot in the lungs. You need to follow up with your caregiver for further evaluation. More lab tests or other studies such as X-rays, electrocardiography, stress testing, or cardiac imaging may be needed to find the cause of your pain.  Most of the time, nonspecific chest pain improves within 2 to 3 days with rest and mild pain medicine. For the next few days, avoid physical exertion or activities that bring on pain. Do not smoke. Avoid drinking alcohol. Call your caregiver for routine follow-up as advised.   SEEK IMMEDIATE MEDICAL CARE IF:  · You develop increased chest pain or pain that radiates to the arm, neck, jaw, back, or abdomen.   · You develop shortness of breath, increased coughing, or you start coughing up blood.   · You have severe back  or abdominal pain, nausea, or vomiting.   · You develop severe weakness, fainting, fever, or chills.   Document Released: 12/18/2006 Document Revised: 03/11/2013 Document Reviewed: 06/06/2008  ExitCare® Patient Information ©2013 [employer].          Patient Information     Patient Information    Following emergency treatment: all patient requiring follow-up care must return either to a private physician or a clinic if your condition worsens before you are able to obtain further medical attention, please return to the emergency room.     Billing Information    At [facility], we work to make the billing process streamlined for our patients.  Our Representatives are here to answer any questions you may have regarding your hospital bill.  If you have insurance coverage and have supplied your insurance information to us, we will submit a claim to your insurer on your behalf.  Should you have any questions regarding your bill, we can be reached online or by phone as follows:  Online: You are able pay your bills online or live chat with our representatives about any billing questions you may have. We are here to help Monday - Friday from 8:00am to 7:30pm and 9:00am - 12:00pm on Saturdays.  Please visit [email or web address]  for more information.   Phone:  [phone] or [phone]    Please note that your emergency physician, surgeon, pathologist, radiologist, anesthesiologist, and other specialists are not employed by [facility] and will therefore bill separately for their services.  Please contact them directly for any questions concerning their bills at the numbers below:     Emergency Physician Services:  [phone]  [facility]:  [phone]  [facility]:  [phone]  [facility]:  [phone]    1. Your final bill may vary from the amount quoted upon discharge if all procedures are not complete at that time, or if your  doctor has additional procedures of which we are not aware. You will receive an additional bill if you return to the Emergency Department at [facility] for suture removal regardless of the facility of which the sutures were placed.     2. Please arrange for settlement of this account at the emergency registration.    3. All self-pay accounts are due in full at the time of treatment.  If you are unable to meet this obligation then payment is expected within 4-5 days.     4. If you have had radiology studies (CT, X-ray, Ultrasound, MRI), you have received a preliminary result during your emergency department visit. Please contact the radiology department [phone] to receive a copy of your final result. Please discuss the Final result with your primary physician or with the follow up physician provided.     Crisis Hotline:  [place] Crisis Hotline:  7-283-GZMWPLP or 1-492.935.8042  [place] Crisis Hotline:    1-377.932.3797 or 711-178-9628         ED Discharge Follow Up Questions    1. In order to provide you with very good care, we would like to follow up with a phone call in the next few days.  May we have your permission to contact you?     YES /  NO    2. What is the best phone number to call you? (       )_____-__________    3. What is the best time to call you?      Morning  /  Afternoon  /  Evening                   Patient Signature:  ____________________________________________________________    Date:  ____________________________________________________________

## 2017-06-29 NOTE — ED AVS SNAPSHOT
6/30/2017    Gina Scott  [address] [place] [address]    Dear Gina:    [facility] wants to ensure your discharge home is safe and you or your loved ones have had all of your questions answered regarding your care after you leave the hospital.    Below is a list of resources and contact information should you have any questions regarding your hospital stay, follow-up instructions, or active medical symptoms.    Questions or Concerns Regarding… Contact   Medical Questions Related to Your Discharge  (7 days a week, 8am-5pm) Contact a Nurse Care Coordinator   [phone]   Medical Questions Not Related to Your Discharge  (24 hours a day / 7 days a week)  Contact the Nurse Health Line   [phone]    Medications or Discharge Instructions Refer to your discharge packet   or contact your [facility] Primary Care Provider   [phone]   Follow-up Appointment(s) Schedule your appointment via [employer] CBOT   or contact Scheduling [phone]   Billing Review your statement via [employer]OT  or contact Billing [phone]   Medical Records Review your records via [employer] CBOT   or contact Medical Records [phone]     You may receive a telephone call within two days of discharge. This call is to make certain you understand your discharge instructions and have the opportunity to have any questions answered. You can also easily access your medical information, test results and upcoming appointments via the [employer] Stream CBOT free online health management tool. You can learn more and sign up at [employer] Stream CBOT. For assistance setting up your [employer]OT account, please call [phone].    Once again, we want to ensure your discharge home is safe and that you have a clear understanding of any next steps in your care. If you have any questions or concerns, please do not hesitate to contact us, we are here for you. Thank you for choosing [facility] for your healthcare needs.    Sincerely,    Your [facility] Healthcare Team

## 2017-06-30 ENCOUNTER — APPOINTMENT (OUTPATIENT)
Dept: RADIOLOGY | Facility: MEDICAL CENTER | Age: 47
End: 2017-06-30
Attending: EMERGENCY MEDICINE
Payer: MEDICARE

## 2017-06-30 VITALS
BODY MASS INDEX: 34.96 KG/M2 | OXYGEN SATURATION: 92 % | RESPIRATION RATE: 16 BRPM | WEIGHT: 190 LBS | HEART RATE: 87 BPM | HEIGHT: 62 IN | SYSTOLIC BLOOD PRESSURE: 129 MMHG | TEMPERATURE: 98.2 F | DIASTOLIC BLOOD PRESSURE: 63 MMHG

## 2017-06-30 LAB
ALBUMIN SERPL BCP-MCNC: 3.7 G/DL (ref 3.2–4.9)
ALBUMIN/GLOB SERPL: 1.2 G/DL
ALP SERPL-CCNC: 88 U/L (ref 30–99)
ALT SERPL-CCNC: 12 U/L (ref 2–50)
ANION GAP SERPL CALC-SCNC: 6 MMOL/L (ref 0–11.9)
APTT PPP: 25.8 SEC (ref 24.7–36)
AST SERPL-CCNC: 13 U/L (ref 12–45)
BASOPHILS # BLD AUTO: 0.5 % (ref 0–1.8)
BASOPHILS # BLD: 0.04 K/UL (ref 0–0.12)
BILIRUB SERPL-MCNC: 0.4 MG/DL (ref 0.1–1.5)
BNP SERPL-MCNC: 9 PG/ML (ref 0–100)
BUN SERPL-MCNC: 13 MG/DL (ref 8–22)
CALCIUM SERPL-MCNC: 8.6 MG/DL (ref 8.5–10.5)
CHLORIDE SERPL-SCNC: 109 MMOL/L (ref 96–112)
CO2 SERPL-SCNC: 24 MMOL/L (ref 20–33)
CREAT SERPL-MCNC: 0.63 MG/DL (ref 0.5–1.4)
EOSINOPHIL # BLD AUTO: 0.27 K/UL (ref 0–0.51)
EOSINOPHIL NFR BLD: 3.1 % (ref 0–6.9)
ERYTHROCYTE [DISTWIDTH] IN BLOOD BY AUTOMATED COUNT: 38 FL (ref 35.9–50)
GFR SERPL CREATININE-BSD FRML MDRD: >60 ML/MIN/1.73 M 2
GLOBULIN SER CALC-MCNC: 3.1 G/DL (ref 1.9–3.5)
GLUCOSE SERPL-MCNC: 103 MG/DL (ref 65–99)
HCT VFR BLD AUTO: 40.3 % (ref 37–47)
HGB BLD-MCNC: 13.5 G/DL (ref 12–16)
IMM GRANULOCYTES # BLD AUTO: 0.02 K/UL (ref 0–0.11)
IMM GRANULOCYTES NFR BLD AUTO: 0.2 % (ref 0–0.9)
INR PPP: 0.97 (ref 0.87–1.13)
LIPASE SERPL-CCNC: 42 U/L (ref 11–82)
LYMPHOCYTES # BLD AUTO: 2.47 K/UL (ref 1–4.8)
LYMPHOCYTES NFR BLD: 28 % (ref 22–41)
MCH RBC QN AUTO: 28.8 PG (ref 27–33)
MCHC RBC AUTO-ENTMCNC: 33.5 G/DL (ref 33.6–35)
MCV RBC AUTO: 86.1 FL (ref 81.4–97.8)
MONOCYTES # BLD AUTO: 0.48 K/UL (ref 0–0.85)
MONOCYTES NFR BLD AUTO: 5.4 % (ref 0–13.4)
NEUTROPHILS # BLD AUTO: 5.55 K/UL (ref 2–7.15)
NEUTROPHILS NFR BLD: 62.8 % (ref 44–72)
NRBC # BLD AUTO: 0 K/UL
NRBC BLD AUTO-RTO: 0 /100 WBC
PLATELET # BLD AUTO: 245 K/UL (ref 164–446)
PMV BLD AUTO: 10.1 FL (ref 9–12.9)
POTASSIUM SERPL-SCNC: 3.6 MMOL/L (ref 3.6–5.5)
PROT SERPL-MCNC: 6.8 G/DL (ref 6–8.2)
PROTHROMBIN TIME: 13.2 SEC (ref 12–14.6)
RBC # BLD AUTO: 4.68 M/UL (ref 4.2–5.4)
SODIUM SERPL-SCNC: 139 MMOL/L (ref 135–145)
TROPONIN I SERPL-MCNC: <0.01 NG/ML (ref 0–0.04)
TROPONIN I SERPL-MCNC: <0.01 NG/ML (ref 0–0.04)
WBC # BLD AUTO: 8.8 K/UL (ref 4.8–10.8)

## 2017-06-30 PROCEDURE — 96374 THER/PROPH/DIAG INJ IV PUSH: CPT | Mod: XU

## 2017-06-30 PROCEDURE — 80053 COMPREHEN METABOLIC PANEL: CPT

## 2017-06-30 PROCEDURE — 84484 ASSAY OF TROPONIN QUANT: CPT

## 2017-06-30 PROCEDURE — 36415 COLL VENOUS BLD VENIPUNCTURE: CPT

## 2017-06-30 PROCEDURE — 94760 N-INVAS EAR/PLS OXIMETRY 1: CPT

## 2017-06-30 PROCEDURE — 700111 HCHG RX REV CODE 636 W/ 250 OVERRIDE (IP): Performed by: EMERGENCY MEDICINE

## 2017-06-30 PROCEDURE — 85730 THROMBOPLASTIN TIME PARTIAL: CPT

## 2017-06-30 PROCEDURE — 83880 ASSAY OF NATRIURETIC PEPTIDE: CPT

## 2017-06-30 PROCEDURE — 83690 ASSAY OF LIPASE: CPT

## 2017-06-30 PROCEDURE — 93005 ELECTROCARDIOGRAM TRACING: CPT | Performed by: EMERGENCY MEDICINE

## 2017-06-30 PROCEDURE — 700117 HCHG RX CONTRAST REV CODE 255: Performed by: EMERGENCY MEDICINE

## 2017-06-30 PROCEDURE — 96375 TX/PRO/DX INJ NEW DRUG ADDON: CPT | Mod: XU

## 2017-06-30 PROCEDURE — 74175 CTA ABDOMEN W/CONTRAST: CPT

## 2017-06-30 PROCEDURE — 71010 DX-CHEST-LIMITED (1 VIEW): CPT

## 2017-06-30 PROCEDURE — 93005 ELECTROCARDIOGRAM TRACING: CPT

## 2017-06-30 PROCEDURE — 700102 HCHG RX REV CODE 250 W/ 637 OVERRIDE(OP): Performed by: EMERGENCY MEDICINE

## 2017-06-30 PROCEDURE — 85025 COMPLETE CBC W/AUTO DIFF WBC: CPT

## 2017-06-30 PROCEDURE — 85610 PROTHROMBIN TIME: CPT

## 2017-06-30 PROCEDURE — A9270 NON-COVERED ITEM OR SERVICE: HCPCS | Performed by: EMERGENCY MEDICINE

## 2017-06-30 RX ORDER — ONDANSETRON 2 MG/ML
4 INJECTION INTRAMUSCULAR; INTRAVENOUS ONCE
Status: COMPLETED | OUTPATIENT
Start: 2017-06-30 | End: 2017-06-30

## 2017-06-30 RX ORDER — SUCRALFATE ORAL 1 G/10ML
1 SUSPENSION ORAL 4 TIMES DAILY
Qty: 1200 ML | Refills: 0 | Status: SHIPPED | OUTPATIENT
Start: 2017-06-30 | End: 2017-07-30

## 2017-06-30 RX ORDER — OMEPRAZOLE 20 MG/1
20 CAPSULE, DELAYED RELEASE ORAL 2 TIMES DAILY
Qty: 60 CAP | Refills: 0 | Status: SHIPPED | OUTPATIENT
Start: 2017-06-30 | End: 2017-07-30

## 2017-06-30 RX ADMIN — LIDOCAINE HYDROCHLORIDE 30 ML: 20 SOLUTION OROPHARYNGEAL at 02:23

## 2017-06-30 RX ADMIN — IOHEXOL 100 ML: 350 INJECTION, SOLUTION INTRAVENOUS at 01:33

## 2017-06-30 RX ADMIN — HYDROMORPHONE HYDROCHLORIDE 1 MG: 1 INJECTION, SOLUTION INTRAMUSCULAR; INTRAVENOUS; SUBCUTANEOUS at 00:47

## 2017-06-30 RX ADMIN — ONDANSETRON 4 MG: 2 INJECTION INTRAMUSCULAR; INTRAVENOUS at 00:47

## 2017-06-30 ASSESSMENT — ENCOUNTER SYMPTOMS
DIZZINESS: 1
CHILLS: 1
SHORTNESS OF BREATH: 0
VOMITING: 1
HEADACHES: 1
NAUSEA: 1
FEVER: 0
DIAPHORESIS: 0
ABDOMINAL PAIN: 1

## 2017-06-30 NOTE — ED NOTES
CT tech reported that the pt threw up with the contrast but photos were still obtained. Pt is back in room.

## 2017-06-30 NOTE — ED NOTES
Pt bib ambulance c/o 6/10 chest pressure that started this afternoon. She was seen by [facility] yesterday and diagnosed with an enlarged hear, valve disease and an unspecified arrhythmia. PTA pt took her own ASA and X2 Nitro spray. Paramedics reported slurred speech on scene but speech cleared in route to hospital. Pt has equal strength bilaterally in all extremities.

## 2017-06-30 NOTE — ED NOTES
GI cocktail given per ERP orders. 2nd troponin drawn, sent to lab. Updated pt on POC. Call light in reach.

## 2017-06-30 NOTE — DISCHARGE INSTRUCTIONS
Return if you have new or different chest pain, fever, vomiting blood, or pass out.   Avoid NSAIDS, and alcohol.     Gastritis, Adult  Gastritis is soreness and swelling (inflammation) of the lining of the stomach. Gastritis can develop as a sudden onset (acute) or long-term (chronic) condition. If gastritis is not treated, it can lead to stomach bleeding and ulcers.  CAUSES   Gastritis occurs when the stomach lining is weak or damaged. Digestive juices from the stomach then inflame the weakened stomach lining. The stomach lining may be weak or damaged due to viral or bacterial infections. One common bacterial infection is the Helicobacter pylori infection. Gastritis can also result from excessive alcohol consumption, taking certain medicines, or having too much acid in the stomach.   SYMPTOMS   In some cases, there are no symptoms. When symptoms are present, they may include:  · Pain or a burning sensation in the upper abdomen.  · Nausea.  · Vomiting.  · An uncomfortable feeling of fullness after eating.  DIAGNOSIS   Your caregiver may suspect you have gastritis based on your symptoms and a physical exam. To determine the cause of your gastritis, your caregiver may perform the following:  · Blood or stool tests to check for the H pylori bacterium.  · Gastroscopy. A thin, flexible tube (endoscope) is passed down the esophagus and into the stomach. The endoscope has a light and camera on the end. Your caregiver uses the endoscope to view the inside of the stomach.  · Taking a tissue sample (biopsy) from the stomach to examine under a microscope.  TREATMENT   Depending on the cause of your gastritis, medicines may be prescribed. If you have a bacterial infection, such as an H pylori infection, antibiotics may be given. If your gastritis is caused by too much acid in the stomach, H2 blockers or antacids may be given. Your caregiver may recommend that you stop taking aspirin, ibuprofen, or other nonsteroidal  anti-inflammatory drugs (NSAIDs).  HOME CARE INSTRUCTIONS  · Only take over-the-counter or prescription medicines as directed by your caregiver.  · If you were given antibiotic medicines, take them as directed. Finish them even if you start to feel better.  · Drink enough fluids to keep your urine clear or pale yellow.  · Avoid foods and drinks that make your symptoms worse, such as:  ¨ Caffeine or alcoholic drinks.  ¨ Chocolate.  ¨ Peppermint or mint flavorings.  ¨ Garlic and onions.  ¨ Spicy foods.  ¨ Citrus fruits, such as oranges, denise, or limes.  ¨ Tomato-based foods such as sauce, chili, salsa, and pizza.  ¨ Fried and fatty foods.  · Eat small, frequent meals instead of large meals.  SEEK IMMEDIATE MEDICAL CARE IF:   · You have black or dark red stools.  · You vomit blood or material that looks like coffee grounds.  · You are unable to keep fluids down.  · Your abdominal pain gets worse.  · You have a fever.  · You do not feel better after 1 week.  · You have any other questions or concerns.  MAKE SURE YOU:  · Understand these instructions.  · Will watch your condition.  · Will get help right away if you are not doing well or get worse.     This information is not intended to replace advice given to you by your health care provider. Make sure you discuss any questions you have with your health care provider.     Document Released: 12/12/2002 Document Revised: 06/18/2013 Document Reviewed: 01/30/2013  [employer] Interactive Patient Education ©2016 [employer].      Chest Pain, Nonspecific  It is often hard to give a specific diagnosis for the cause of chest pain. There is always a chance that your pain could be related to something serious, like a heart attack or a blood clot in the lungs. You need to follow up with your caregiver for further evaluation. More lab tests or other studies such as X-rays, electrocardiography, stress testing, or cardiac imaging may be needed to find the cause of your pain.  Most of  the time, nonspecific chest pain improves within 2 to 3 days with rest and mild pain medicine. For the next few days, avoid physical exertion or activities that bring on pain. Do not smoke. Avoid drinking alcohol. Call your caregiver for routine follow-up as advised.   SEEK IMMEDIATE MEDICAL CARE IF:  · You develop increased chest pain or pain that radiates to the arm, neck, jaw, back, or abdomen.   · You develop shortness of breath, increased coughing, or you start coughing up blood.   · You have severe back or abdominal pain, nausea, or vomiting.   · You develop severe weakness, fainting, fever, or chills.   Document Released: 12/18/2006 Document Revised: 03/11/2013 Document Reviewed: 06/06/2008  Spotlight® Patient Information ©2013 [employer], [facility].

## 2017-06-30 NOTE — ED PROVIDER NOTES
"ED Provider Note    Scribed for Ye Carrasco M.D. by Dedrick Jacobson. 6/30/2017, 12:25 AM.    Primary care provider: Pcp Pt States None  Means of arrival: ambulance  History obtained from: patient  History limited by: none    CHIEF COMPLAINT  Chief Complaint   Patient presents with   • Chest Pressure   • Shortness of Breath       HPI  Gina Scott is a 46 y.o. female who presents to the Emergency Department for evaluation of chest pain onset yesterday. Per patient, she was seen in a clinic yesterday, where she had a normal EKG and she was given nitroglycerin which improved her symptoms. The patient was prompted by her doctor to come to the ED yesterday, but states she chose not to come because she felt better. Today, patient states she started to experience severe abdominal pain that she explains \"feels like my kidneys are tearing\". After dinner tonight, the patient started experiencing chest pressure. Patient adds her chest pain radiated to her left arm, and it \"felt like someone was sitting on me\". She took Tylenol and Nitroglycerin, which did not resolve her symptoms. She also started experiencing associated dizziness, headache, chills, nausea, and vomiting. She explains, \"whenever I would sit down, I would black out and everything got fuzzy\". Patient states she was given 4 baby aspirin by EMS, which improved her headache. The patient reports a recent diagnosis of hypertension. Patient also has a history of pancreatitis. She also confirms a family history of CHF, diabetes, and hypertension. She denies a history of DVT or PE. She also denies dysuria, shortness of breath, diaphoresis, or fever.     REVIEW OF SYSTEMS  Review of Systems   Constitutional: Positive for chills. Negative for fever and diaphoresis.   Respiratory: Negative for shortness of breath.    Cardiovascular: Positive for chest pain.   Gastrointestinal: Positive for nausea, vomiting and abdominal pain.   Genitourinary: Negative " for dysuria.   Neurological: Positive for dizziness and headaches.   All other systems reviewed and are negative.      C.      PAST MEDICAL HISTORY   has a past medical history of Chronic back pain (11/24/2009); ENDOMETRIOSIS (11/24/2009); Hypothyroid (11/24/2009); Grave's disease (11/24/2009); Vitamin d deficiency (11/24/2009); Hypothyroid; Asthma; Kidney stones; Spinal cord stimulator status; Bronchitis; Osteoporosis; PND (post-nasal drip); Seizure disorder (CMS-[facility]); and Mild intermittent asthma without complication (1/13/2017).    SURGICAL HISTORY   has past surgical history that includes other orthopedic surgery; abdominal hysterectomy total; appendectomy; and laminotomy.    SOCIAL HISTORY  Social History   Substance Use Topics   • Smoking status: Former Smoker -- 0.15 packs/day for 22 years     Types: Cigarettes     Quit date: 07/16/2014   • Smokeless tobacco: Never Used      Comment: smoked pack and half a week in august of 2009   • Alcohol Use: No      History   Drug Use No       FAMILY HISTORY  Family History   Problem Relation Age of Onset   • Hypertension Mother    • Diabetes Mother    • Hypertension Father    • Diabetes Father    • Cancer Maternal Aunt      aunt and cousin have breast cancer   • Genetic Neg Hx    • Lung Disease Neg Hx        CURRENT MEDICATIONS  No current facility-administered medications on file prior to encounter.     Current Outpatient Prescriptions on File Prior to Encounter   Medication Sig Dispense Refill   • levothyroxine (SYNTHROID) 112 MCG Tab Take 112 mcg by mouth.     • ibuprofen (MOTRIN) 800 MG Tab Take 1 Tab by mouth every 8 hours as needed for Mild Pain or Moderate Pain. 30 Tab 0   • Azelastine HCl 0.15 % Solution Spray 1 Spray in nose 2 Times a Day. 1 to 2 sprays as needed. 1 mL 5   • MethylPREDNISolone (MEDROL DOSEPAK) 4 MG Tablet Therapy Pack Take as directed. 21 Tab 0   • Tiotropium Bromide Monohydrate (SPIRIVA RESPIMAT) 1.25 MCG/ACT Aero Soln Inhale 2 Inhalation by  "mouth every day. 1 Inhaler 5   • Mometasone Furo-Formoterol Fum 200-5 MCG/ACT Aerosol Inhale 200 mcg by mouth 4 times a day.     • Oxycodone-Acetaminophen (PERCOCET)  MG Tab Take 1-2 Tabs by mouth every 6 hours as needed for Severe Pain. 20 Tab 0   • albuterol (VENTOLIN OR PROVENTIL) 108 (90 BASE) MCG/ACT AERS inhalation aerosol Inhale 2 Puffs by mouth every 6 hours as needed for Shortness of Breath. 8.5 g 3   • levothyroxine (SYNTHROID) 112 MCG TABS Take 112 mcg by mouth every morning before breakfast. 125 mcg        ALLERGIES  Allergies   Allergen Reactions   • [place] Swelling     Mouth swells up     • Darvocet [Propoxyphene N-Apap] Anaphylaxis   • Hctz [Hydrochlorothiazide]    • Pcn [Penicillins]        PHYSICAL EXAM  VITAL SIGNS: /63 mmHg  Pulse 88  Temp(Src) 36.8 °C (98.2 °F)  Resp 12  Ht 1.575 m (5' 2\")  Wt 86.183 kg (190 lb)  BMI 34.74 kg/m2  SpO2 96%  LMP 01/01/1998    Constitutional: Well developed, Well nourished, moderate distress.   HENT: Normocephalic, Atraumatic, Oropharynx moist.   Eyes: Conjunctiva normal, No discharge.   Neck: Supple, No stridor   Cardiovascular: Normal heart rate, Normal rhythm, No murmurs, equal pulses.   Pulmonary: Normal breath sounds, No respiratory distress, No wheezing, No rales, No rhonchi.  Chest: No chest wall tenderness or deformity.   Abdomen: Obese abdomen. Soft, moderate epigastric tenderness. No masses, no rebound, no guarding.   Back: Bilateral CVA tenderness.   Musculoskeletal: No major deformities noted, No tenderness. No calf tenderness. No cords, appears symmetric   Skin: Warm, Dry, No erythema, No rash.   Neurologic: Alert & oriented x 3, Normal motor function,  No focal deficits noted.   Psychiatric: Affect normal, Judgment normal, Mood normal.     LABS  Results for orders placed or performed during the hospital encounter of 06/29/17   Troponin   Result Value Ref Range    Troponin I <0.01 0.00 - 0.04 ng/mL   Btype Natriuretic Peptide "   Result Value Ref Range    B Natriuretic Peptide 9 0 - 100 pg/mL   CBC with Differential   Result Value Ref Range    WBC 8.8 4.8 - 10.8 K/uL    RBC 4.68 4.20 - 5.40 M/uL    Hemoglobin 13.5 12.0 - 16.0 g/dL    Hematocrit 40.3 37.0 - 47.0 %    MCV 86.1 81.4 - 97.8 fL    MCH 28.8 27.0 - 33.0 pg    MCHC 33.5 (L) 33.6 - 35.0 g/dL    RDW 38.0 35.9 - 50.0 fL    Platelet Count 245 164 - 446 K/uL    MPV 10.1 9.0 - 12.9 fL    Neutrophils-Polys 62.80 44.00 - 72.00 %    Lymphocytes 28.00 22.00 - 41.00 %    Monocytes 5.40 0.00 - 13.40 %    Eosinophils 3.10 0.00 - 6.90 %    Basophils 0.50 0.00 - 1.80 %    Immature Granulocytes 0.20 0.00 - 0.90 %    Nucleated RBC 0.00 /100 WBC    Neutrophils (Absolute) 5.55 2.00 - 7.15 K/uL    Lymphs (Absolute) 2.47 1.00 - 4.80 K/uL    Monos (Absolute) 0.48 0.00 - 0.85 K/uL    Eos (Absolute) 0.27 0.00 - 0.51 K/uL    Baso (Absolute) 0.04 0.00 - 0.12 K/uL    Immature Granulocytes (abs) 0.02 0.00 - 0.11 K/uL    NRBC (Absolute) 0.00 K/uL   Complete Metabolic Panel (CMP)   Result Value Ref Range    Sodium 139 135 - 145 mmol/L    Potassium 3.6 3.6 - 5.5 mmol/L    Chloride 109 96 - 112 mmol/L    Co2 24 20 - 33 mmol/L    Anion Gap 6.0 0.0 - 11.9    Glucose 103 (H) 65 - 99 mg/dL    Bun 13 8 - 22 mg/dL    Creatinine 0.63 0.50 - 1.40 mg/dL    Calcium 8.6 8.5 - 10.5 mg/dL    AST(SGOT) 13 12 - 45 U/L    ALT(SGPT) 12 2 - 50 U/L    Alkaline Phosphatase 88 30 - 99 U/L    Total Bilirubin 0.4 0.1 - 1.5 mg/dL    Albumin 3.7 3.2 - 4.9 g/dL    Total Protein 6.8 6.0 - 8.2 g/dL    Globulin 3.1 1.9 - 3.5 g/dL    A-G Ratio 1.2 g/dL   Prothrombin Time   Result Value Ref Range    PT 13.2 12.0 - 14.6 sec    INR 0.97 0.87 - 1.13   APTT   Result Value Ref Range    APTT 25.8 24.7 - 36.0 sec   Lipase   Result Value Ref Range    Lipase 42 11 - 82 U/L   ESTIMATED GFR   Result Value Ref Range    GFR If African American >60 >60 mL/min/1.73 m 2    GFR If Non African American >60 >60 mL/min/1.73 m 2   TROPONIN   Result Value Ref  Range    Troponin I <0.01 0.00 - 0.04 ng/mL   EKG (ER)   Result Value Ref Range    Report       [facility] Emergency Dept.    Test Date:  2017  Pt Name:    VANNESA RUCKER           Department: ER  MRN:        [id number]                      Room:        22  Gender:     F                            Technician: [id number]  :        1970                   Requested By:ER TRIAGE PROTOCOL  Order #:    [id number]                    Reading MD:    Measurements  Intervals                                Axis  Rate:       86                           P:          20  FL:         136                          QRS:        51  QRSD:       78                           T:          -25  QT:         372  QTc:        445    Interpretive Statements  SINUS RHYTHM  EARLY PRECORDIAL R/S TRANSITION  BORDERLINE T ABNORMALITIES, DIFFUSE LEADS  Compared to ECG 2016 19:27:23  T-wave abnormality now present        All labs reviewed by me.    EKG  12 Lead EKG interpreted by me shows a normal sinus rhythm at a rate of 86. Axis normal. No ST elevations. V2, V3, V4, and lead 3. Flattening of V5, V6, and aVF. Old EKG from 2016 shows new T wave inversions and flattening. Final impression: New T-wave inversions and flattening. .     RADIOLOGY  CT-CTA COMPLETE THORACOABDOMINAL AORTA   Final Result      1.  No acute traumatic aortic injury or other arterial disease   2.  Unremarkable chest CT   3.  8 mm proximal RIGHT ureteral stone without obstructive uropathy   4.  Spinal stimulator in place   5.  Lumbosacral fusion   6.  Nondistended gallbladder or interval cholecystectomy   7.  Prior hysterectomy         DX-CHEST-LIMITED (1 VIEW)   Final Result      LEFT basilar atelectasis or pneumonia        The radiologist's interpretation of all radiological studies have been reviewed by me.    COURSE & MEDICAL DECISION MAKING  Pertinent Labs & Imaging studies reviewed. (See chart for details)    12:25 AM - Patient seen  and examined at bedside. Patient will be treated with 1 mg Dilaudid, 4 mg Zofran. Ordered CTA thoracoabdominal aorta CT, chest x-ray, EKG, Troponin, BNP, CBC with differential, CMP, PTT, APTT, Lipase to evaluate her symptoms. I discussed the treatment plan as above with the patient. She understood and verbalized agreement. The differential diagnoses include but are not limited to: myocardial infarction    12:33 AM - I independently reviewed the patient's medical records, which show she had a normal stress test in 8/2016.    2:17 AM - I reevaluated the patient at bedside. She is still experiencing some abdominal pain, but it is improved. I updated the patient on her lab, EKG, and radiology results as above. Patient will be given a GI cocktail for her symptoms.  She understood and verbalized agreement.     2:55 AM - I reevaluated the patient at bedside. She is feeling improved after the GI cocktail. I informed the patient that her symptoms are likely GI associated rather than cardiac, and her labs and radiology results are reassuring that her symptoms are not cardiac related. I will send the patient home with Prilosec and Carafate. I advised the patient to follow-up with her primary care physician. I welcomed the patient to return to the ED with new or worsening symptoms. She understood and verbalized agreement.     Medical Decision Making: At this point time I think the patient's pain may be more GI related such as gastritis or an ulcer. Patient has had this pain for over 24 hours and has a negative troponin ×2. Her EKG is unremarkable. Patient has had a recent stress test within the last year which was negative for any ischemic changes. Patient had relief of her symptoms with GI cocktail therefore I think this more likely GI related. Patient's pain went back lipase is negative do not think his pancreatitis. I did do a CTA of her aorta to make sure this was not a aortic dissection which was also normal. At this point  time I think the patient to be discharged to follow-up with her primary care provider will start her on Prilosec and Carafate. The patient will return for new or worsening symptoms and is stable at the time of discharge.    The patient is referred to a primary physician for blood pressure management, diabetic screening, and for all other preventative health concerns.    DISPOSITION:  Patient will be discharged home in stable condition.    FOLLOW UP:  Your Physician  Varies    Schedule an appointment as soon as possible for a visit in 3 days      [facility]  [place] [id number]  [phone]      If you keep having this pain you may need to have a scope to look for ulcers.     [facility]  [address]  [place] [address]  [phone]      If you need a doctor    [address] [address]  [phone]    If you need a doctor      OUTPATIENT MEDICATIONS:  Discharge Medication List as of 6/30/2017  3:32 AM      START taking these medications    Details   omeprazole (PRILOSEC) 20 MG delayed-release capsule Take 1 Cap by mouth 2 times a day for 30 days., Disp-60 Cap, R-0, Normal      sucralfate (CARAFATE) 1 GM/10ML Suspension Take 10 mL by mouth 4 times a day for 30 days., Disp-1200 mL, R-0, Normal               FINAL IMPRESSION  1. Epigastric pain    2. Chest pain, unspecified type          I, Dedrick Jacobson (Keiko), am scribing for, and in the presence of, Ye Carrasco M.D.    Electronically signed by: Dedrick Sarabia), 6/30/2017    IYe M.D. personally performed the services described in this documentation, as scribed by Dedrick Jacobson in my presence, and it is both accurate and complete.    The note accurately reflects work and decisions made by me.  Ye Carrasco  6/30/2017  4:14 AM

## 2017-07-11 ENCOUNTER — HOSPITAL ENCOUNTER (OUTPATIENT)
Dept: RADIOLOGY | Facility: MEDICAL CENTER | Age: 47
End: 2017-07-11
Attending: FAMILY MEDICINE
Payer: MEDICARE

## 2017-07-11 DIAGNOSIS — E89.0 POSTPROCEDURAL HYPOTHYROIDISM: ICD-10-CM

## 2017-07-11 PROCEDURE — 76536 US EXAM OF HEAD AND NECK: CPT

## 2017-07-28 ENCOUNTER — HOSPITAL ENCOUNTER (OUTPATIENT)
Dept: RADIOLOGY | Facility: MEDICAL CENTER | Age: 47
End: 2017-07-28
Attending: PHYSICIAN ASSISTANT
Payer: MEDICARE

## 2017-07-28 ENCOUNTER — OFFICE VISIT (OUTPATIENT)
Dept: URGENT CARE | Facility: PHYSICIAN GROUP | Age: 47
End: 2017-07-28
Payer: MEDICARE

## 2017-07-28 ENCOUNTER — HOSPITAL ENCOUNTER (OUTPATIENT)
Facility: MEDICAL CENTER | Age: 47
End: 2017-07-28
Attending: PHYSICIAN ASSISTANT
Payer: MEDICARE

## 2017-07-28 VITALS
RESPIRATION RATE: 16 BRPM | HEIGHT: 62 IN | WEIGHT: 181 LBS | SYSTOLIC BLOOD PRESSURE: 122 MMHG | BODY MASS INDEX: 33.31 KG/M2 | HEART RATE: 80 BPM | OXYGEN SATURATION: 97 % | TEMPERATURE: 97.9 F | DIASTOLIC BLOOD PRESSURE: 78 MMHG

## 2017-07-28 DIAGNOSIS — R10.9 FLANK PAIN: ICD-10-CM

## 2017-07-28 DIAGNOSIS — N20.0 RENAL STONE: ICD-10-CM

## 2017-07-28 DIAGNOSIS — R31.9 HEMATURIA: ICD-10-CM

## 2017-07-28 DIAGNOSIS — Z87.442 HISTORY OF RENAL STONE: ICD-10-CM

## 2017-07-28 LAB
APPEARANCE UR: CLEAR
BILIRUB UR STRIP-MCNC: NORMAL MG/DL
COLOR UR AUTO: NORMAL
GLUCOSE UR STRIP.AUTO-MCNC: NORMAL MG/DL
KETONES UR STRIP.AUTO-MCNC: NORMAL MG/DL
LEUKOCYTE ESTERASE UR QL STRIP.AUTO: NORMAL
NITRITE UR QL STRIP.AUTO: NORMAL
PH UR STRIP.AUTO: 5 [PH] (ref 5–8)
PROT UR QL STRIP: NORMAL MG/DL
RBC UR QL AUTO: NORMAL
SP GR UR STRIP.AUTO: 1.03
UROBILINOGEN UR STRIP-MCNC: NORMAL MG/DL

## 2017-07-28 PROCEDURE — 81002 URINALYSIS NONAUTO W/O SCOPE: CPT | Performed by: PHYSICIAN ASSISTANT

## 2017-07-28 PROCEDURE — 96372 THER/PROPH/DIAG INJ SC/IM: CPT | Performed by: PHYSICIAN ASSISTANT

## 2017-07-28 PROCEDURE — 99214 OFFICE O/P EST MOD 30 MIN: CPT | Mod: 25 | Performed by: PHYSICIAN ASSISTANT

## 2017-07-28 PROCEDURE — 74176 CT ABD & PELVIS W/O CONTRAST: CPT

## 2017-07-28 RX ORDER — HYDROCODONE BITARTRATE AND ACETAMINOPHEN 5; 325 MG/1; MG/1
1 TABLET ORAL EVERY 8 HOURS PRN
Qty: 15 TAB | Refills: 0 | Status: SHIPPED | OUTPATIENT
Start: 2017-07-28 | End: 2018-01-22

## 2017-07-28 RX ORDER — KETOROLAC TROMETHAMINE 30 MG/ML
30 INJECTION, SOLUTION INTRAMUSCULAR; INTRAVENOUS ONCE
Status: COMPLETED | OUTPATIENT
Start: 2017-07-28 | End: 2017-07-28

## 2017-07-28 RX ORDER — CIPROFLOXACIN 500 MG/1
500 TABLET, FILM COATED ORAL 2 TIMES DAILY
COMMUNITY
End: 2018-01-22

## 2017-07-28 RX ORDER — KETOROLAC TROMETHAMINE 30 MG/ML
30 INJECTION, SOLUTION INTRAMUSCULAR; INTRAVENOUS ONCE
Status: DISCONTINUED | OUTPATIENT
Start: 2017-07-28 | End: 2017-07-28 | Stop reason: CLARIF

## 2017-07-28 RX ADMIN — KETOROLAC TROMETHAMINE 30 MG: 30 INJECTION, SOLUTION INTRAMUSCULAR; INTRAVENOUS at 12:21

## 2017-07-28 ASSESSMENT — ENCOUNTER SYMPTOMS
CHILLS: 0
COUGH: 0
MYALGIAS: 0
NECK PAIN: 0
VOMITING: 0
EYE DISCHARGE: 0
FEVER: 0
SORE THROAT: 0
BACK PAIN: 0
NAUSEA: 1
ABDOMINAL PAIN: 1
EYE REDNESS: 0
SWEATS: 0
FLANK PAIN: 1
HEADACHES: 0
WHEEZING: 0
ROS GI COMMENTS: SUPRAPUBIC PRESSURE

## 2017-07-28 NOTE — PROGRESS NOTES
Subjective:      Gina Scott is a 47 y.o. female who presents with Dysuria          Pt is 48y/o female who presents with intermittent dysuria, hematuria, flank pain, and nausea for the last month- pt. Was evaluated in the ED on 6/30 for similar symptoms- however she was also having associated CP- CT scan revealed a renal stone of which she admits that she doesn't think that she has passed it as she has been straining her urine and has not seen anything. She has been to her PCP twice in the last month- started on Macrobid and completed meds without relief of symptoms- she then was placed on Cipro 2 days ago for dysuria and again her symptoms have not improved.   Dysuria   The current episode started more than 1 month ago. The problem occurs intermittently. The problem has been waxing and waning. The quality of the pain is described as burning. The pain is at a severity of 6/10. The pain is moderate. There has been no fever. She is sexually active. There is no history of pyelonephritis. Associated symptoms include flank pain, frequency, hematuria, hesitancy, nausea and urgency. Pertinent negatives include no chills, discharge, possible pregnancy, sweats or vomiting. She has tried acetaminophen (Macrobid and Cipro) for the symptoms. The treatment provided mild relief. Her past medical history is significant for kidney stones.       Review of Systems   Constitutional: Negative for fever, chills and malaise/fatigue.   HENT: Negative for congestion and sore throat.    Eyes: Negative for discharge and redness.   Respiratory: Negative for cough and wheezing.    Cardiovascular: Negative for chest pain and leg swelling.   Gastrointestinal: Positive for nausea and abdominal pain. Negative for vomiting.        Suprapubic pressure   Genitourinary: Positive for dysuria, hesitancy, urgency, frequency, hematuria and flank pain.   Musculoskeletal: Negative for myalgias, back pain and neck pain.   Skin: Negative for  "itching and rash.   Neurological: Negative for headaches.   All other systems are reviewed and are negative.          Objective:     /78 mmHg  Pulse 80  Temp(Src) 36.6 °C (97.9 °F)  Resp 16  Ht 1.575 m (5' 2.01\")  Wt 82.101 kg (181 lb)  BMI 33.10 kg/m2  SpO2 97%  LMP 01/01/1998  Breastfeeding? No   PMH:  has a past medical history of Chronic back pain (11/24/2009); ENDOMETRIOSIS (11/24/2009); Hypothyroid (11/24/2009); Grave's disease (11/24/2009); Vitamin d deficiency (11/24/2009); Hypothyroid; Asthma; Kidney stones; Spinal cord stimulator status; Bronchitis; Osteoporosis; PND (post-nasal drip); Seizure disorder (CMS-[facility]); and Mild intermittent asthma without complication (1/13/2017). She also has no past medical history of Breast cancer (CMS-[facility]).  MEDS:   Current outpatient prescriptions:   •  ciprofloxacin (CIPRO) 500 MG Tab, Take 500 mg by mouth 2 times a day., Disp: , Rfl:   •  omeprazole (PRILOSEC) 20 MG delayed-release capsule, Take 1 Cap by mouth 2 times a day for 30 days., Disp: 60 Cap, Rfl: 0  •  sucralfate (CARAFATE) 1 GM/10ML Suspension, Take 10 mL by mouth 4 times a day for 30 days., Disp: 1200 mL, Rfl: 0  •  levothyroxine (SYNTHROID) 112 MCG Tab, Take 112 mcg by mouth., Disp: , Rfl:   •  ibuprofen (MOTRIN) 800 MG Tab, Take 1 Tab by mouth every 8 hours as needed for Mild Pain or Moderate Pain., Disp: 30 Tab, Rfl: 0  •  Azelastine HCl 0.15 % Solution, Spray 1 Spray in nose 2 Times a Day. 1 to 2 sprays as needed., Disp: 1 mL, Rfl: 5  •  MethylPREDNISolone (MEDROL DOSEPAK) 4 MG Tablet Therapy Pack, Take as directed., Disp: 21 Tab, Rfl: 0  •  Tiotropium Bromide Monohydrate (SPIRIVA RESPIMAT) 1.25 MCG/ACT Aero Soln, Inhale 2 Inhalation by mouth every day., Disp: 1 Inhaler, Rfl: 5  •  Mometasone Furo-Formoterol Fum 200-5 MCG/ACT Aerosol, Inhale 200 mcg by mouth 4 times a day., Disp: , Rfl:   •  Oxycodone-Acetaminophen (PERCOCET)  MG Tab, Take 1-2 Tabs by mouth every 6 hours as needed for " Severe Pain., Disp: 20 Tab, Rfl: 0  •  albuterol (VENTOLIN OR PROVENTIL) 108 (90 BASE) MCG/ACT AERS inhalation aerosol, Inhale 2 Puffs by mouth every 6 hours as needed for Shortness of Breath., Disp: 8.5 g, Rfl: 3  •  levothyroxine (SYNTHROID) 112 MCG TABS, Take 112 mcg by mouth every morning before breakfast. 125 mcg, Disp: , Rfl:   ALLERGIES:   Allergies   Allergen Reactions   • [place] Swelling     Mouth swells up     • Darvocet [Propoxyphene N-Apap] Anaphylaxis   • Hctz [Hydrochlorothiazide]    • Pcn [Penicillins]      SURGHX:   Past Surgical History   Procedure Laterality Date   • Other orthopedic surgery       fusion   • Abdominal hysterectomy total       ovarian  cyst endometriosis   • Appendectomy     • Laminotomy       fusion L5     SOCHX:  reports that she quit smoking about 3 years ago. Her smoking use included Cigarettes. She has a 3.3 pack-year smoking history. She has never used smokeless tobacco. She reports that she does not drink alcohol or use illicit drugs.  FH: Family history was reviewed, no pertinent findings to report    Physical Exam   Constitutional: She is oriented to person, place, and time. She appears well-developed and well-nourished.   HENT:   Head: Normocephalic and atraumatic. Head is without raccoon's eyes and without Kinsey's sign.   Nose: Nose normal. No rhinorrhea or nasal deformity.   Mouth/Throat: Uvula is midline and oropharynx is clear and moist. No oropharyngeal exudate.   Eyes: Conjunctivae, EOM and lids are normal. Pupils are equal, round, and reactive to light. Right eye exhibits no chemosis and no discharge. Left eye exhibits no chemosis and no discharge.   Neck: Normal range of motion. Neck supple. No tracheal deviation present.   Cardiovascular: Normal rate and regular rhythm.    No murmur heard.  Pulmonary/Chest: Effort normal and breath sounds normal. No respiratory distress. She exhibits no tenderness and no bony tenderness.   Abdominal: Soft. Bowel sounds are  normal. She exhibits no distension and no mass. There is tenderness. There is no rebound.   Minimal suprapubic tenderness. Pos. Bilateral CVAT .   Musculoskeletal: Normal range of motion. She exhibits no edema.   Lymphadenopathy:     She has no cervical adenopathy.   Neurological: She is alert and oriented to person, place, and time. She is not disoriented. Coordination normal. GCS eye subscore is 4. GCS verbal subscore is 5. GCS motor subscore is 6.   Skin: Skin is warm and dry.   Psychiatric: She has a normal mood and affect. Her behavior is normal. Judgment and thought content normal.   Vitals reviewed.              POC Color gold Negative Final      POC Appearance clear Negative Final     POC Leukocyte Esterase neg Negative Final     POC Nitrites neg Negative Final     POC Urobiligen neg Negative (0.2) mg/dL Final     POC Protein neg Negative mg/dL Final     POC Urine PH 5.0 5.0 - 8.0 Final     POC Blood LG Negative Final     POC Specific Gravity 1.030 <1.005 - >1.030 Final     POC Ketones neg Negative mg/dL Final     POC Biliruben sm Negative mg/dL Final     POC Glucose neg       Renal CT:     image shows postoperative change of lower lumbar spine with thoracic spine stimulator present as well.  The liver is unremarkable.  The spleen is unremarkable.  Adrenal glands are unremarkable.  The kidneys are unremarkable.  The pancreas is unremarkable.  Gallbladder is contracted or absent.  Increased colonic stool.  Bladder is unremarkable.  Uterus is absent.  Ovaries not visualized.  Postoperative change from prior appendectomy.  Calcification along the course of distal LEFT ureter at the level of the upper sacrum, measuring 3 mm.  Abdominal aorta is unremarkable.  Assessment/Plan:     1. Flank pain  - CT-RENAL COLIC EVALUATION(A/P W/O); Future  - POCT Urinalysis  - URINE CULTURE(NEW); Future    2. History of renal stone  - CT-RENAL COLIC EVALUATION(A/P W/O); Future  - POCT Urinalysis  - URINE CULTURE(NEW);  Future    3. Hematuria  4. Renal stone- 3 mm.      Pt. Is to see her PCP on Monday. Keep upcoming appt. With Urology. Utilize urine strainer. Continue on Cipro as well.   Toradol was given today. Norco was also written- NARXCHECK was reviewed by myself-  Document  does not reveal any concerning patterns- consistent with prior hx. Pt. was advised to avoid the operation of heavy machine along with driving while on such medications. Finally pt. was advised to use medication only as prescribed.     Patient given precautionary s/sx that mandate immediate follow up and evaluation in the ED. Advised of risks of not doing so.    DDX, Supportive care, and indications for immediate follow-up discussed with patient.    Instructed to return to clinic or nearest emergency department if we are not available for any change in condition, further concerns, or worsening of symptoms.    The patient demonstrated a good understanding and agreed with the treatment plan.

## 2017-07-28 NOTE — MR AVS SNAPSHOT
"        Gina Ag Tyler   2017 10:00 AM   Office Visit   MRN: [id number]    Department:  [facility]   Dept Phone:  [phone]    Description:  Female : 1970   Provider:  Juan Cadena PA-C           Reason for Visit     Dysuria states has UTI [id number]       Allergies as of 2017     Allergen Noted Reactions    Bucyrus 2015   Swelling    Mouth swells up      Darvocet [Propoxyphene N-Apap] 2009   Anaphylaxis    Hctz [Hydrochlorothiazide] 2015       Pcn [Penicillins] 2014         You were diagnosed with     Flank pain   [987488]       History of renal stone   [[id number]]       Hematuria   [1615452]       Renal stone   [[id number]]         Vital Signs     Blood Pressure Pulse Temperature Respirations Height Weight    122/78 mmHg 80 36.6 °C (97.9 °F) 16 1.575 m (5' 2.01\") 82.101 kg (181 lb)    Body Mass Index Oxygen Saturation Last Menstrual Period Breastfeeding? Smoking Status       33.10 kg/m2 97% 1998 No Former Smoker       Basic Information     Date Of Birth Sex Race Ethnicity Preferred Language    1970 Female  or  Non- English      Problem List              ICD-10-CM Priority Class Noted - Resolved    Hypothyroid E03.9   2009 - Present    Chronic back pain M54.9, G89.29   2009 - Present    ENDOMETRIOSIS    2009 - Present    Graves' disease E05.00   2009 - Present    Vitamin d deficiency    2009 - Present    Fatty liver K76.0   3/31/2011 - Present    Pancreatitis, acute K85.90   2013 - Present    Opiate addiction (CMS-HCC) F11.20   7/10/2013 - Present    Vertigo R42   2014 - Present    Mild intermittent asthma without complication J45.20   2017 - Present      Health Maintenance        Date Due Completion Dates    IMM DTaP/Tdap/Td Vaccine (1 - Tdap) 1991 ---    PAP SMEAR 1993 ---    IMM INFLUENZA (1) 2017    MAMMOGRAM 2018, 2012, " 10/19/2011, 11/24/2004            Results     POCT Urinalysis      Component Value Standard Range & Units    POC Color gold Negative    POC Appearance clear Negative    POC Leukocyte Esterase neg Negative    POC Nitrites neg Negative    POC Urobiligen neg Negative (0.2) mg/dL    POC Protein neg Negative mg/dL    POC Urine PH 5.0 5.0 - 8.0    POC Blood LG Negative    POC Specific Gravity 1.030 <1.005 - >1.030    POC Ketones neg Negative mg/dL    POC Biliruben sm Negative mg/dL    POC Glucose neg Negative mg/dL                        Current Immunizations     Influenza TIV (IM) 1/23/2014  1:01 PM    Pneumococcal polysaccharide vaccine (PPSV-23) 7/10/2013  4:00 AM      Below and/or attached are the medications your provider expects you to take. Review all of your home medications and newly ordered medications with your provider and/or pharmacist. Follow medication instructions as directed by your provider and/or pharmacist. Please keep your medication list with you and share with your provider. Update the information when medications are discontinued, doses are changed, or new medications (including over-the-counter products) are added; and carry medication information at all times in the event of emergency situations     Allergies:  WALNUT - Swelling     DARVOCET - Anaphylaxis     HCTZ - (reactions not documented)     PCN - (reactions not documented)               Medications  Valid as of: July 28, 2017 - 12:48 PM    Generic Name Brand Name Tablet Size Instructions for use    Albuterol Sulfate (Aero Soln) albuterol 108 (90 BASE) MCG/ACT Inhale 2 Puffs by mouth every 6 hours as needed for Shortness of Breath.        Azelastine HCl (Solution) Azelastine HCl 0.15 % Spray 1 Spray in nose 2 Times a Day. 1 to 2 sprays as needed.        Ciprofloxacin HCl (Tab) CIPRO 500 MG Take 500 mg by mouth 2 times a day.        Hydrocodone-Acetaminophen (Tab) NORCO 5-325 MG Take 1 Tab by mouth every 8 hours as needed.        Ibuprofen  (Tab) MOTRIN 800 MG Take 1 Tab by mouth every 8 hours as needed for Mild Pain or Moderate Pain.        Levothyroxine Sodium (Tab) SYNTHROID 112 MCG Take 112 mcg by mouth every morning before breakfast. 125 mcg        Levothyroxine Sodium (Tab) SYNTHROID 112 MCG Take 112 mcg by mouth.        MethylPREDNISolone (Tablet Therapy Pack) MEDROL DOSEPAK 4 MG Take as directed.        Mometasone Furo-Formoterol Fum (Aerosol) Mometasone Furo-Formoterol Fum 200-5 MCG/ACT Inhale 200 mcg by mouth 4 times a day.        Omeprazole (CAPSULE DELAYED RELEASE) PRILOSEC 20 MG Take 1 Cap by mouth 2 times a day for 30 days.        Oxycodone-Acetaminophen (Tab) PERCOCET-10  MG Take 1-2 Tabs by mouth every 6 hours as needed for Severe Pain.        Sucralfate (Suspension) CARAFATE 1 GM/10ML Take 10 mL by mouth 4 times a day for 30 days.        Tiotropium Bromide Monohydrate (Aero Soln) Tiotropium Bromide Monohydrate 1.25 MCG/ACT Inhale 2 Inhalation by mouth every day.        .                 Medicines prescribed today were sent to:     [id number] - [place], [place] - [address]    [address] [facility] [place] [address]    Phone: [phone] Fax: [phone]    Open 24 Hours?: No      Medication refill instructions:       If your prescription bottle indicates you have medication refills left, it is not necessary to call your provider’s office. Please contact your pharmacy and they will refill your medication.    If your prescription bottle indicates you do not have any refills left, you may request refills at any time through one of the following ways: The online [employer] system (except Urgent Care), by calling your provider’s office, or by asking your pharmacy to contact your provider’s office with a refill request. Medication refills are processed only during regular business hours and may not be available until the next business day. Your provider may request additional information or to have a follow-up visit with you prior  to refilling your medication.   *Please Note: Medication refills are assigned a new Rx number when refilled electronically. Your pharmacy may indicate that no refills were authorized even though a new prescription for the same medication is available at the pharmacy. Please request the medicine by name with the pharmacy before contacting your provider for a refill.        Your To Do List     Future Labs/Procedures Complete By Expires    CT-RENAL COLIC EVALUATION(A/P W/O)  As directed 7/28/2018    URINE CULTURE(NEW)  As directed 7/28/2018         MyChart Access Code: Activation code not generated  Current [employer] Status: Active

## 2017-07-30 ENCOUNTER — HOSPITAL ENCOUNTER (EMERGENCY)
Dept: HOSPITAL 8 - ED | Age: 47
Discharge: HOME | End: 2017-07-30
Payer: MEDICARE

## 2017-07-30 VITALS — HEIGHT: 62 IN | WEIGHT: 197.31 LBS | BODY MASS INDEX: 36.31 KG/M2

## 2017-07-30 VITALS — DIASTOLIC BLOOD PRESSURE: 44 MMHG | SYSTOLIC BLOOD PRESSURE: 105 MMHG

## 2017-07-30 VITALS — SYSTOLIC BLOOD PRESSURE: 128 MMHG | DIASTOLIC BLOOD PRESSURE: 75 MMHG

## 2017-07-30 VITALS — WEIGHT: 200.18 LBS | HEIGHT: 62 IN | BODY MASS INDEX: 36.84 KG/M2

## 2017-07-30 DIAGNOSIS — M54.9: ICD-10-CM

## 2017-07-30 DIAGNOSIS — Z90.49: ICD-10-CM

## 2017-07-30 DIAGNOSIS — J45.909: ICD-10-CM

## 2017-07-30 DIAGNOSIS — Z90.710: ICD-10-CM

## 2017-07-30 DIAGNOSIS — R31.9: ICD-10-CM

## 2017-07-30 DIAGNOSIS — R11.2: ICD-10-CM

## 2017-07-30 DIAGNOSIS — N13.2: Primary | ICD-10-CM

## 2017-07-30 DIAGNOSIS — G89.29: ICD-10-CM

## 2017-07-30 DIAGNOSIS — E03.9: ICD-10-CM

## 2017-07-30 DIAGNOSIS — R07.89: Primary | ICD-10-CM

## 2017-07-30 DIAGNOSIS — Z87.891: ICD-10-CM

## 2017-07-30 LAB
AST SERPL-CCNC: 24 U/L (ref 15–37)
BACTERIA UR CULT: NORMAL
BUN SERPL-MCNC: 15 MG/DL (ref 7–18)
BUN SERPL-MCNC: 17 MG/DL (ref 7–18)
IS PT STATUS REG ER OR PRE ER?: YES
SIGNIFICANT IND 70042: NORMAL
SOURCE SOURCE: NORMAL

## 2017-07-30 PROCEDURE — 85610 PROTHROMBIN TIME: CPT

## 2017-07-30 PROCEDURE — 85025 COMPLETE CBC W/AUTO DIFF WBC: CPT

## 2017-07-30 PROCEDURE — 71010: CPT

## 2017-07-30 PROCEDURE — 74176 CT ABD & PELVIS W/O CONTRAST: CPT

## 2017-07-30 PROCEDURE — 99285 EMERGENCY DEPT VISIT HI MDM: CPT

## 2017-07-30 PROCEDURE — 96375 TX/PRO/DX INJ NEW DRUG ADDON: CPT

## 2017-07-30 PROCEDURE — 81001 URINALYSIS AUTO W/SCOPE: CPT

## 2017-07-30 PROCEDURE — 74000: CPT

## 2017-07-30 PROCEDURE — 96376 TX/PRO/DX INJ SAME DRUG ADON: CPT

## 2017-07-30 PROCEDURE — 83690 ASSAY OF LIPASE: CPT

## 2017-07-30 PROCEDURE — 87086 URINE CULTURE/COLONY COUNT: CPT

## 2017-07-30 PROCEDURE — 96361 HYDRATE IV INFUSION ADD-ON: CPT

## 2017-07-30 PROCEDURE — 82040 ASSAY OF SERUM ALBUMIN: CPT

## 2017-07-30 PROCEDURE — 36415 COLL VENOUS BLD VENIPUNCTURE: CPT

## 2017-07-30 PROCEDURE — 85379 FIBRIN DEGRADATION QUANT: CPT

## 2017-07-30 PROCEDURE — 80048 BASIC METABOLIC PNL TOTAL CA: CPT

## 2017-07-30 PROCEDURE — 96374 THER/PROPH/DIAG INJ IV PUSH: CPT

## 2017-07-30 PROCEDURE — 93005 ELECTROCARDIOGRAM TRACING: CPT

## 2017-07-30 PROCEDURE — 84484 ASSAY OF TROPONIN QUANT: CPT

## 2017-07-30 PROCEDURE — 80053 COMPREHEN METABOLIC PANEL: CPT

## 2017-07-30 RX ADMIN — MORPHINE SULFATE PRN MG: 4 INJECTION INTRAVENOUS at 11:26

## 2017-07-30 RX ADMIN — MORPHINE SULFATE PRN MG: 4 INJECTION INTRAVENOUS at 10:12

## 2017-08-08 ENCOUNTER — HOSPITAL ENCOUNTER (OUTPATIENT)
Dept: CARDIOLOGY | Facility: MEDICAL CENTER | Age: 47
End: 2017-08-08
Attending: FAMILY MEDICINE
Payer: MEDICARE

## 2017-08-08 DIAGNOSIS — I49.3 VENTRICULAR PREMATURE BEATS: ICD-10-CM

## 2017-08-08 LAB
LV EJECT FRACT  99904: 60
LV EJECT FRACT MOD 2C 99903: 62.61
LV EJECT FRACT MOD 4C 99902: 60.97
LV EJECT FRACT MOD BP 99901: 59.98

## 2017-08-08 PROCEDURE — 93306 TTE W/DOPPLER COMPLETE: CPT

## 2017-08-08 PROCEDURE — 93306 TTE W/DOPPLER COMPLETE: CPT | Mod: 26 | Performed by: INTERNAL MEDICINE

## 2017-11-22 ENCOUNTER — APPOINTMENT (OUTPATIENT)
Dept: PULMONOLOGY | Facility: HOSPICE | Age: 47
End: 2017-11-22
Payer: MEDICARE

## 2018-01-03 ENCOUNTER — SLEEP CENTER VISIT (OUTPATIENT)
Dept: SLEEP MEDICINE | Facility: MEDICAL CENTER | Age: 48
End: 2018-01-03
Payer: MEDICARE

## 2018-01-03 ENCOUNTER — TELEPHONE (OUTPATIENT)
Dept: SLEEP MEDICINE | Facility: MEDICAL CENTER | Age: 48
End: 2018-01-03

## 2018-01-03 VITALS
TEMPERATURE: 97.1 F | BODY MASS INDEX: 37.17 KG/M2 | OXYGEN SATURATION: 93 % | HEIGHT: 62 IN | HEART RATE: 85 BPM | WEIGHT: 202 LBS | SYSTOLIC BLOOD PRESSURE: 124 MMHG | DIASTOLIC BLOOD PRESSURE: 84 MMHG | RESPIRATION RATE: 16 BRPM

## 2018-01-03 DIAGNOSIS — J45.30 MILD PERSISTENT ASTHMA WITHOUT COMPLICATION: ICD-10-CM

## 2018-01-03 DIAGNOSIS — G47.30 SLEEP APNEA, UNSPECIFIED TYPE: ICD-10-CM

## 2018-01-03 DIAGNOSIS — G47.10 HYPERSOMNOLENCE: ICD-10-CM

## 2018-01-03 PROCEDURE — 99214 OFFICE O/P EST MOD 30 MIN: CPT | Performed by: INTERNAL MEDICINE

## 2018-01-03 RX ORDER — ALPRAZOLAM 0.5 MG/1
0.5 TABLET ORAL
COMMUNITY
End: 2018-01-22

## 2018-01-03 RX ORDER — LEVETIRACETAM 500 MG/1
500 TABLET ORAL
COMMUNITY
End: 2018-01-22

## 2018-01-03 RX ORDER — TOPIRAMATE 100 MG/1
TABLET, FILM COATED ORAL
COMMUNITY
End: 2018-01-22

## 2018-01-03 RX ORDER — HYDROCODONE BITARTRATE AND ACETAMINOPHEN 10; 325 MG/1; MG/1
TABLET ORAL
COMMUNITY
End: 2018-01-22

## 2018-01-03 RX ORDER — ZOLPIDEM TARTRATE 5 MG/1
TABLET ORAL
Qty: 3 TAB | Refills: 0 | Status: SHIPPED | OUTPATIENT
Start: 2018-01-03 | End: 2018-01-22

## 2018-01-03 RX ORDER — NITROGLYCERIN 0.4 MG/1
0.4 TABLET SUBLINGUAL
COMMUNITY
End: 2018-01-22

## 2018-01-03 RX ORDER — ALBUTEROL SULFATE 90 UG/1
2 AEROSOL, METERED RESPIRATORY (INHALATION) EVERY 4 HOURS PRN
Qty: 1 INHALER | Refills: 3 | Status: SHIPPED | OUTPATIENT
Start: 2018-01-03 | End: 2021-01-07

## 2018-01-03 RX ORDER — DIVALPROEX SODIUM 500 MG/1
500 TABLET, EXTENDED RELEASE ORAL
COMMUNITY
End: 2018-01-22

## 2018-01-03 NOTE — TELEPHONE ENCOUNTER
Called PCP office, LEFT VOICEMAIL requesting a copy of the home sleep testing patient completed around 6mos ago be faxed to [phone] (to the sleep center).

## 2018-01-04 ENCOUNTER — SLEEP STUDY (OUTPATIENT)
Dept: SLEEP MEDICINE | Facility: MEDICAL CENTER | Age: 48
End: 2018-01-04
Attending: INTERNAL MEDICINE
Payer: MEDICARE

## 2018-01-04 DIAGNOSIS — G47.30 SLEEP APNEA, UNSPECIFIED TYPE: ICD-10-CM

## 2018-01-04 DIAGNOSIS — G47.10 HYPERSOMNOLENCE: ICD-10-CM

## 2018-01-04 PROCEDURE — 95810 POLYSOM 6/> YRS 4/> PARAM: CPT | Performed by: FAMILY MEDICINE

## 2018-01-04 NOTE — PROGRESS NOTES
CC:  Snoring and excessive daytime somnolence, suspected sleep apnea hypopnea syndrome.    HPI:   Ms. Scott is a 47-year-old woman referred by BRIANNE Bartlett, to assist in the evaluation and management of suspected sleep-disordered breathing.    Over the past year she's noticed increasing problems with non-refreshing sleep and excessive daytime somnolence, associated with an 8-10 pound weight gain. She now awakens at night feeling short of breath and gasping for air. She has a regular sleep schedule with bedtime at about 10 PM and she falls asleep within 30-60 minutes. She awakens perhaps twice and an average night and arises between 7 and 8 on work days but closer to 9:51 AM on days off. In the mornings she is frequently tired and groggy with morning headaches. She has been told that she does snore but we don't have information on possible cyclical breathing or apnea. She is quite tired during the day infrequently dozes off during quiet moments or as a passenger in a motor vehicle. Her [place] sleepiness score is elevated at 16 points. She denies caffeinated beverages sparingly substances to sleep or maintaining wakefulness. She does not have symptoms suggesting narcolepsy, parasomnia or restless leg syndrome. She has not previously been evaluated for sleep problems.    Her medical history is remarkable for chronic back pain with previous spinal fusion and placement of a spinal stimulator. She is not using narcotic analgesics. She has hypothyroidism on a stable dose of replacement therapy. She was evaluated in 2016 for episodic cough and dyspnea. A pulmonary function study demonstrated normal dynamic airflow, normal lung volumes and diffusing capacity but there was a marginal improvement in FEV1 after bronchodilator. She improved with the use of dual error twice a day and the occasional use of an albuterol inhaler but these medications have been discontinued. At this time she describes episodic  exertional dyspnea and a cough producing occasional amounts of clear sputum without purulence or hemoptysis. She has heard herself wheezing on occasion.        Patient Active Problem List    Diagnosis Date Noted   • Mild intermittent asthma without complication 01/13/2017   • Vertigo 01/22/2014   • Opiate addiction (CMS-HCC) 07/10/2013   • Pancreatitis, acute 07/09/2013   • Fatty liver 03/31/2011   • Hypothyroid 11/24/2009   • Chronic back pain 11/24/2009   • ENDOMETRIOSIS 11/24/2009   • Graves' disease 11/24/2009   • Vitamin d deficiency 11/24/2009       Past Medical History:   Diagnosis Date   • Asthma    • Bronchitis    • Chickenpox    • Chronic back pain 11/24/2009   • ENDOMETRIOSIS 11/24/2009   • Grave's disease 11/24/2009   • Hypothyroid 11/24/2009   • Hypothyroid    • Hypothyroidism    • Influenza    • Kidney stones    • Mild intermittent asthma without complication 1/13/2017   • Osteoporosis    • PND (post-nasal drip)    • Seizure disorder (CMS-HCC)    • Sleep apnea    • Spinal cord stimulator status    • Tonsillitis    • Venereal disease    • Vitamin d deficiency 11/24/2009       Past Surgical History:   Procedure Laterality Date   • ABDOMINAL HYSTERECTOMY TOTAL      ovarian  cyst endometriosis   • APPENDECTOMY     • CHOLECYSTECTOMY     • LAMINOTOMY      fusion L5   • LAPAROTOMY     • OTHER ORTHOPEDIC SURGERY      fusion       Family History   Problem Relation Age of Onset   • Hypertension Mother    • Diabetes Mother    • Hypertension Father    • Diabetes Father    • Cancer Father    • Cancer Maternal Aunt      aunt and cousin have breast cancer   • No Known Problems Sister    • No Known Problems Brother    • No Known Problems Son    • No Known Problems Son    • No Known Problems Son    • Genetic Neg Hx    • Lung Disease Neg Hx    • Sleep Apnea Neg Hx        Social History     Social History   • Marital status:      Spouse name: N/A   • Number of children: N/A   • Years of education: N/A      Occupational History   • Not on file.     Social History Main Topics   • Smoking status: Former Smoker     Packs/day: 0.15     Years: 22.00     Types: Cigarettes     Quit date: 7/16/2014   • Smokeless tobacco: Never Used      Comment: smoked pack and half a week in august of 2009   • Alcohol use No   • Drug use: No   • Sexual activity: Not on file      Comment:      Other Topics Concern   • Not on file     Social History Narrative   • No narrative on file       Current Outpatient Prescriptions   Medication Sig Dispense Refill   • nitroglycerin (NITROSTAT) 0.4 MG SL Tab Place 0.4 mg under tongue every 5 minutes as needed for Chest Pain.     • zolpidem (AMBIEN) 5 MG Tab Take 1-2 tablets by mouth every evening as needed for insomnia. Bring to sleep study. 3 Tab 0   • Fluticasone Furoate-Vilanterol (BREO ELLIPTA) 100-25 MCG/INH AEROSOL POWDER, BREATH ACTIVATED Inhale 1 Puff by mouth every day. Rinse mouth after use. 1 Each 3   • albuterol (PROVENTIL HFA) 108 (90 Base) MCG/ACT Aero Soln inhalation aerosol Inhale 2 Puffs by mouth every four hours as needed for Shortness of Breath (Wheezing). 1 Inhaler 3   • levothyroxine (SYNTHROID) 112 MCG Tab Take 112 mcg by mouth.     • ibuprofen (MOTRIN) 800 MG Tab Take 1 Tab by mouth every 8 hours as needed for Mild Pain or Moderate Pain. 30 Tab 0   • albuterol (VENTOLIN OR PROVENTIL) 108 (90 BASE) MCG/ACT AERS inhalation aerosol Inhale 2 Puffs by mouth every 6 hours as needed for Shortness of Breath. 8.5 g 3   • alprazolam (XANAX) 0.5 MG Tab Take 0.5 mg by mouth.     • divalproex ER (DEPAKOTE ER) 500 MG TABLET SR 24 HR Take 500 mg by mouth.     • GABAPENTIN PO Take  by mouth.     • levetiracetam (KEPPRA) 500 MG Tab Take 500 mg by mouth.     • topiramate (TOPAMAX) 100 MG Tab Take  by mouth.     • hydrocodone/acetaminophen (NORCO)  MG Tab Take  by mouth.     • ciprofloxacin (CIPRO) 500 MG Tab Take 500 mg by mouth 2 times a day.     • hydrocodone-acetaminophen (NORCO)  "5-325 MG Tab per tablet Take 1 Tab by mouth every 8 hours as needed. 15 Tab 0   • Azelastine HCl 0.15 % Solution Spray 1 Spray in nose 2 Times a Day. 1 to 2 sprays as needed. 1 mL 5   • MethylPREDNISolone (MEDROL DOSEPAK) 4 MG Tablet Therapy Pack Take as directed. 21 Tab 0   • Tiotropium Bromide Monohydrate (SPIRIVA RESPIMAT) 1.25 MCG/ACT Aero Soln Inhale 2 Inhalation by mouth every day. 1 Inhaler 5   • Mometasone Furo-Formoterol Fum 200-5 MCG/ACT Aerosol Inhale 200 mcg by mouth 4 times a day.     • Oxycodone-Acetaminophen (PERCOCET)  MG Tab Take 1-2 Tabs by mouth every 6 hours as needed for Severe Pain. 20 Tab 0     No current facility-administered medications for this visit.     \"CURRENT RX\"      Allergies: Hoffmeister; Darvocet [propoxyphene n-apap]; Hctz [hydrochlorothiazide]; and Pcn [penicillins]      ROS  Positive for the sleep, respiratory, cardiac and chronic pain issues reviewed above as well as the [place] problem list and past medical history. She wears corrective lenses but has no diplopia. She has episodic tinnitus and some loss of auditory acuity. She denies heartburn or abdominal discomfort. She has episodic palpitations without syncope and arthralgias in addition to the back pain. All other aspects of the CPT review of systems process are negative as outlined in the attached self history form.      Physical Exam:   /84   Pulse 85   Temp 36.2 °C (97.1 °F)   Resp 16   Ht 1.575 m (5' 2\")   Wt 91.6 kg (202 lb)   LMP 01/01/1998   SpO2 93%   BMI 36.95 kg/m²    Head and neck examination demonstrates no mucosal lesion, purulent drainage or evident polyps. The pharynx is crowded but benign with a Mallampati III presentation. The neck is supple without thyromegaly. On chest examination there are symmetrical bilateral breath sounds without rales, wheezing or consolidation. On cardiac examination, the apical impulse and heart sounds are normal and the rhythm is regular. There is no murmur, gallop " or rub and no jugular venous distention. The abdomen is soft with active bowel sounds and no palpable hepatosplenomegaly, mass, guarding or rebound. The extremities show no clubbing, cyanosis or edema and no signs of deep venous thrombosis. There is no warmth, redness, tenderness or palpable venous cord in the calves. The skin is clear, warm and dry. There is no unusual peripheral lymphadenopathy. Peripheral pulses are palpable in all 4 extremities. On neurologic examination, cranial nerve function is intact, motor tone is symmetrical, and the patient is alert, oriented and responsive.       Problems:  1. Sleep apnea, unspecified type  She presents with snoring, non-refreshing sleep and excessive daytime somnolence. She has a crowded posterior pharyngeal airway, a body mass index of 37 and a history of hypothyroidism.  The clinical probability of sleep apnea hypopnea syndrome is significant. Accurate diagnosis and effective treatment are required not only to improve levels of daytime alertness but also to reduce the cardiac and neurologic risks associated with untreated sleep apnea. We have discussed diagnostic options including in-laboratory, attended polysomnography and home sleep testing. We have also discussed treatment options including airway pressurization, reconstructive otolaryngologic surgery, dental appliances and weight management.    2. Hypersomnolence  Probably related to the suspected sleep-disordered breathing. She is spending sufficient nightly time in bed and does not seem to have problems with sleep hygiene.    3. Mild persistent asthma without complication  Her history of episodic dyspnea, minimally productive cough and wheezing responsive to bronchodilators to suggest mild asthma. Her pulmonary function studies when essentially normal with some marginal improvement in dynamic airflow after bronchodilator. There is a little to suggest bacterial infection, congestive heart failure, recurrent  thromboembolism or interstitial lung disease. She did smoke a package of cigarettes per week for about 22 years but quit smoking in 2014.      Plan:   1. Polysomnogram, possible split night study.    2. With her persisting respiratory symptoms and previous responsiveness to bronchodilators will restart and as a medication program including Breo 100 µg once a day and an albuterol inhaler up to 2 puffs every 4 hours as needed. She is instructed to rinse her mouth when she uses the Breo inhaler. She is current on pneumococcal vaccination and would be a good candidate for the influenza vaccination which we do not have on-site.     3. Return visit after testing to discuss results and treatment options.    We appreciate the opportunity to assist in her care.

## 2018-01-08 NOTE — PROCEDURES
Comments:  The patient underwent a diagnostic polysomnogram using the standard montage for measurement of parameters of sleep, respiratory events, movement abnormalities, and heart rate and rhythm.    A microphone was used to monitor snoring.  Interpretation:  Study start time was 09:06:00 PM.  Diagnostic recording time was 8h 32.5m with a total sleep time of 7h 34.5m resulting in a sleep efficiency of 88.68%%.    Sleep latency from the start of the study was 07 minutes and the latency from sleep to REM was 184 minutes.  In total,100  arousals were scored for an arousal index of 13.2.  Respiratory:  There were a total of 5 apneas consisting of 0 obstructive apneas, 1 mixed apneas, and 4 central apneas.  A total of 70 hypopneas were scored.  The apnea index was 0.66 per hour and the hypopnea index was 9.24 per hour resulting in an overall AHI of 9.90.  AHI during rem was 4.75 and AHI while supine was 10.05.  Oximetry:  There was a mean oxygen saturation of 92.0% with a minimum oxygen saturation of 82.0%.  Time spent with oxygen saturations below 89% was 7.7 minutes.  Cardiac:  The highest heart rate seen while awake was 100 BPM while the highest heart rate during sleep was 101 BPM with an average sleeping heart rate of 76 BPM.  Limb Movements:  There were a total of 0 PLMs during sleep, of which 0 were PLMS arousals.  This resulted in a PLMS index of 0.0 and a PLMS arousal index of 0.0.    Technical summary:The patient underwent a diagnostic polysomnogram. This was a 16 channel montage study to include a 6 channel EEG, a 2 channel EOG, and chin EMG, left and right leg EMG, a snore channel, a nasal pressure transducer, and a nasal oral airflow semester.   Respiratory effort was assessed with the use of a thoracic and abdominal monitor and overnight oximetry was obtained. Audio and video recordings were reviewed. This was a fully attended study and sleep stage scoring was performed. The test was technically adequate.      General sleep summary:  General sleep summary:  During the overnight study, the sleep latency was 7 min which is decreased. The REM latency was 66 which is normal. The total sleep time was 454 min and sleep efficiency was 88 which is decreased.  Sleep stage proportions showed N3 and REM sleep.  In regards to sleep quality there was a mild degree of sleep fragmentation as shown by the arousal index of 13 an hour which is increased. The arousals were due to spontaneous.    Respiratory summary: During the overnight study, the patient demonstrated mild obstructive sleep apnea as shown by the apnea hypopnea index of 9.9/hr . There was a total of 5 apneas and 70 hypopneas . In the supine position the respiratory disturbance index was 11.2 an hour and in the non-supine position the respiratory disturbance index was 1.2 per hour. The respiratory events were associated with O2 sylvia of 85% and averahe O2 saturation of 92%. There was mild snoring. The patient demonstrated a NSR with an average heartbeat of 76 bpm.     Periodic limb movement summary: The patient demonstrated an normal degree of periodic limb movements as shown by the PLM index of 0 per hour.      Impression:  1.  Mild CHRISTOPHER      Recommendations:  Recommend the patient return for a CPAP titration. In some cases alternative treatment options may prove effective in resolving sleep apnea and these options include upper airway surgery, the use of a dental orthotic or weight loss and positional therapy. Clinical correlation is required. In general patients with sleep apnea are advised to avoid alcohol and sedatives and to not operate a motor vehicle while drowsy and are at a greater risk for cardiovascular disease.

## 2018-01-09 NOTE — TELEPHONE ENCOUNTER
Home testing not received.  In-lab diagnostic study completed 01/04/2018 and scanned into media tab.

## 2018-01-22 ENCOUNTER — SLEEP CENTER VISIT (OUTPATIENT)
Dept: SLEEP MEDICINE | Facility: MEDICAL CENTER | Age: 48
End: 2018-01-22
Payer: MEDICARE

## 2018-01-22 VITALS
WEIGHT: 202 LBS | HEART RATE: 88 BPM | HEIGHT: 62 IN | RESPIRATION RATE: 16 BRPM | SYSTOLIC BLOOD PRESSURE: 116 MMHG | DIASTOLIC BLOOD PRESSURE: 83 MMHG | BODY MASS INDEX: 37.17 KG/M2 | OXYGEN SATURATION: 95 %

## 2018-01-22 DIAGNOSIS — H66.92 CHRONIC EAR INFECTION, LEFT: ICD-10-CM

## 2018-01-22 DIAGNOSIS — F11.90 CHRONIC, CONTINUOUS USE OF OPIOIDS: ICD-10-CM

## 2018-01-22 DIAGNOSIS — J35.2 ENLARGED ADENOIDS: ICD-10-CM

## 2018-01-22 DIAGNOSIS — G47.33 OBSTRUCTIVE SLEEP APNEA: ICD-10-CM

## 2018-01-22 DIAGNOSIS — E03.9 HYPOTHYROIDISM, UNSPECIFIED TYPE: ICD-10-CM

## 2018-01-22 DIAGNOSIS — J45.20 MILD INTERMITTENT ASTHMA WITHOUT COMPLICATION: ICD-10-CM

## 2018-01-22 PROCEDURE — 99214 OFFICE O/P EST MOD 30 MIN: CPT | Performed by: NURSE PRACTITIONER

## 2018-01-22 NOTE — PATIENT INSTRUCTIONS
"Patient understands the need to use device every night for >4hrs to meet compliance standards for insurance purposes.  Patient understands they may have mask fits within first 30 days of therapy covered by insurance to obtain best fit.    CPAP and BIPAP Information    CPAP and BIPAP are methods of helping you breathe with the use of air pressure. CPAP stands for \"continuous positive airway pressure.\" BIPAP stands for \"bi-level positive airway pressure.\" In both methods, air is blown into your air passages to help keep you breathing well. With CPAP, the amount of pressure stays the same while you breathe in and out. CPAP is most commonly used for obstructive sleep apnea. For obstructive sleep apnea, CPAP works by holding your airways open so that they do not collapse when your muscles relax during sleep. BIPAP is similar to CPAP except the amount of pressure is increased when you inhale. This helps you take larger breaths. Your health care provider will recommend whether CPAP or BIPAP would be more helpful for you.   WHY ARE CPAP AND BIPAP TREATMENTS USED?  CPAP or BIPAP can be helpful if you have:   · Sleep apnea.    · Chronic obstructive pulmonary disease (COPD).    · Diseases that weaken the muscles of the chest, including muscular dystrophy or neurological diseases such as amyotrophic lateral sclerosis (ALS).  · Other problems that cause breathing to be weak, abnormal, or difficult.    HOW IS CPAP OR BIPAP ADMINISTERED?  Both CPAP and BIPAP are provided by a small machine with a flexible plastic tube that attaches to a plastic mask. The mask fits on your face, and air is blown into your air passages through your nose or mouth. The amount of pressure that is used to blow the air into your air passages can be set on the machine. Your health care provider will determine the pressure setting that should be used based on your individual needs.  WHEN SHOULD CPAP OR BIPAP BE USED?  In most cases, the mask is worn only " when sleeping. Generally, you will need to wear the mask throughout the night and during the daytime if you take a nap. In a few cases involving certain medical conditions, people also need to wear the mask at other times when they are awake. Follow your health care provider's instructions for when to use the machine.   USING THE MASK  · Because the mask needs to be snug, some people feel a trapped or closed-in feeling (claustrophobic) when first using the mask. You may need to get used to the mask gradually. To do this, you can first hold the mask loosely over your nose or mouth. Gradually apply the mask more snugly. You can also gradually increase the amount of time that you use the mask.  · Masks are available in various types and sizes. Some fit over your mouth and nose, and some fit over just your nose. If your mask does not fit well, talk to your health care provider about getting a different one.  · If you are using a nasal mask and you tend to breathe through your mouth, a chin strap may be applied to help keep your mouth closed.    · The CPAP and BIPAP machines have alarms that may sound if the mask comes off or develops a leak.  · If you have trouble with the mask, it is very important that you talk to your health care provider about finding a way to make the mask easier to tolerate. Do not stop using the mask. This could have a negative impact on your health.  TIPS FOR USING THE MACHINE  · Place your CPAP or BIPAP machine on a secure table or stand near an electrical outlet.    · Know where the on-off switch is located on the machine.  · Follow your health care provider's instructions for how to set the pressure on your machine and when you should use it.  · Do not eat or drink while the CPAP or BIPAP machine is on. Food or fluids could get pushed into your lungs by the pressure of the CPAP or BIPAP.  · Do not smoke. Tobacco smoke residue can damage the machine.    · For home use, CPAP and BIPAP machines  can be rented or purchased through home health care companies. Many different brands of machines are available. Renting a machine before purchasing may help you find out which particular machine works well for you.  SEEK IMMEDIATE MEDICAL CARE IF:  · You have redness or open areas around your nose or mouth where the mask fits.    · You have trouble operating the CPAP or BIPAP machine.    · You cannot tolerate wearing the CPAP or BIPAP mask.       This information is not intended to replace advice given to you by your health care provider. Make sure you discuss any questions you have with your health care provider.     Document Released: 09/15/2005 Document Revised: 01/08/2016 Document Reviewed: 07/17/2014  Elsevier Interactive Patient Education ©2016 [employer]

## 2018-01-22 NOTE — PROGRESS NOTES
Chief Complaint   Patient presents with   • Results     SS       HPI:  Gina Scott is a 47 y.o. year old female here today for follow-up on sleep study results. Please see Dr. Major's note 1/3/2018 for full history. In short patient has a history of chronic back pain with previous spinal fusion and spinal stimulator. She has a history of chronic opioid use and is currently not using any. She also has a history of hypothyroidism and episodic cough with dyspnea. She was started on Breo 100 µg one puff daily and albuterol HFA inhaler last office visit has noted improvement in her dyspnea. She also has a history of chronic left ear infections due to an open hole in her ear. She's had it since she was a teenager. She would like to have this examined and corrected.    PSG 1/4/2018 indicated overall mild obstructive sleep apnea with an overall AHI of 9.90 and a minimum oxygen saturation of 82%. Patient spent 7.7 minutes less than 89% oxygen saturation. PLMS index of 0.0. No EKG abnormalities noted. I reviewed testing with patient and treatment modalities including airway pressurization, dental appliance, weight loss and oral surgery. She would like to initiate auto CPAP but also be evaluated by ENT for possible oral surgery. Today she overall feels well. She denies cardiac symptoms. She notes a dry cough with nasal congestion and left ear pain today. She denies fever, chills, night sweats, chest pain, swelling or hemoptysis. She does have a crowded airway.               ROS: As per HPI and otherwise negative if not stated.    Past Medical History:   Diagnosis Date   • Asthma    • Bronchitis    • Chickenpox    • Chronic back pain 11/24/2009   • ENDOMETRIOSIS 11/24/2009   • Grave's disease 11/24/2009   • Hypothyroid 11/24/2009   • Hypothyroid    • Hypothyroidism    • Influenza    • Kidney stones    • Mild intermittent asthma without complication 1/13/2017   • Osteoporosis    • PND (post-nasal drip)    •  "Seizure disorder ([facility])    • Sleep apnea    • Spinal cord stimulator status    • Tonsillitis    • Venereal disease    • Vitamin d deficiency 11/24/2009       Past Surgical History:   Procedure Laterality Date   • ABDOMINAL HYSTERECTOMY TOTAL      ovarian  cyst endometriosis   • APPENDECTOMY     • CHOLECYSTECTOMY     • LAMINOTOMY      fusion L5   • LAPAROTOMY     • OTHER ORTHOPEDIC SURGERY      fusion       Family History   Problem Relation Age of Onset   • Hypertension Mother    • Diabetes Mother    • Hypertension Father    • Diabetes Father    • Cancer Father    • Cancer Maternal Aunt      aunt and cousin have breast cancer   • No Known Problems Sister    • No Known Problems Brother    • No Known Problems Son    • No Known Problems Son    • No Known Problems Son    • Genetic Neg Hx    • Lung Disease Neg Hx    • Sleep Apnea Neg Hx        Social History     Social History   • Marital status:      Spouse name: N/A   • Number of children: N/A   • Years of education: N/A     Occupational History   • Not on file.     Social History Main Topics   • Smoking status: Former Smoker     Packs/day: 0.15     Years: 22.00     Types: Cigarettes     Quit date: 7/16/2014   • Smokeless tobacco: Never Used      Comment: smoked pack and half a week in august of 2009   • Alcohol use No   • Drug use: No   • Sexual activity: Not on file      Comment:      Other Topics Concern   • Not on file     Social History Narrative   • No narrative on file       Allergies as of 01/22/2018 - Reviewed 01/22/2018   Allergen Reaction Noted   • Flintstone Swelling 09/19/2015   • Darvocet [propoxyphene n-apap] Anaphylaxis 11/09/2009   • Hctz [hydrochlorothiazide]  09/19/2015   • Pcn [penicillins]  12/26/2014        @Vital signs for this encounter:  Vitals:    01/22/18 1349   Height: 1.575 m (5' 2\")   Weight: 91.6 kg (202 lb)   Weight % change since last entry.: 0 %   BP: 116/83   Pulse: 88   BMI (Calculated): 36.95   Resp: 16   O2 sat % room " air: 95 %       Current medications as of today   Current Outpatient Prescriptions   Medication Sig Dispense Refill   • Fluticasone Furoate-Vilanterol (BREO ELLIPTA) 100-25 MCG/INH AEROSOL POWDER, BREATH ACTIVATED Inhale 1 Puff by mouth every day. Rinse mouth after use. 1 Each 3   • albuterol (PROVENTIL HFA) 108 (90 Base) MCG/ACT Aero Soln inhalation aerosol Inhale 2 Puffs by mouth every four hours as needed for Shortness of Breath (Wheezing). 1 Inhaler 3   • levothyroxine (SYNTHROID) 112 MCG Tab Take 112 mcg by mouth.     • ibuprofen (MOTRIN) 800 MG Tab Take 1 Tab by mouth every 8 hours as needed for Mild Pain or Moderate Pain. 30 Tab 0   • albuterol (VENTOLIN OR PROVENTIL) 108 (90 BASE) MCG/ACT AERS inhalation aerosol Inhale 2 Puffs by mouth every 6 hours as needed for Shortness of Breath. 8.5 g 3     No current facility-administered medications for this visit.          Physical Exam:   Gen:           Alert and oriented, No apparent distress. Mood and affect appropriate, normal interaction with examiner.  Eyes:          PERRL, EOM intact, sclere white, conjunctive moist.  Ears:          Not examined.   Hearing:     Grossly intact.  Nose:          Normal, no lesions or deformities.  Dentition:    Good dentition.  Oropharynx:   Tongue normal, posterior pharynx without erythema or exudate.  Mallampati Classification: 3/4  Neck:        Supple, trachea midline, no masses.  Respiratory Effort: No intercostal retractions or use of accessory muscles.   Lung Auscultation:      Clear to auscultation bilaterally; no rales, rhonchi or wheezing.  CV:            Regular rate and rhythm. No murmurs, rubs or gallops.  Abd:           Not examined.   Lymphadenopathy: Not examined.  Gait and Station: Normal.  Digits and Nails: No clubbing, cyanosis, petechiae, or nodes.   Cranial Nerves: II-XII grossly intact.  Skin:        No rashes, lesions or ulcers noted.               Ext:           No cyanosis or edema.      Assessment:  1.  Obstructive sleep apnea  REFERRAL TO ENT    DME CPAP   2. Mild intermittent asthma without complication     3. Chronic, continuous use of opioids     4. Hypothyroidism, unspecified type     5. BMI 36.0-36.9,adult  HEIGHT AND WEIGHT   6. Chronic ear infection, left  REFERRAL TO ENT   7. Enlarged adenoids  REFERRAL TO ENT       Immunizations:    Flu:declines  Pneumovax 23:2013  Prevnar 13:not given    Plan:  1. DME order auto CPAP 5-10 cm H2O nightly. Patient understands they may have mask fits within first 30 days of therapy covered by insurance to obtain best fit.  Patient understands the need to use device every night for >4hrs to meet compliance standards for insurance purposes.  2. Discussed sleep hygiene.  3. Referral to ENT for evaluation of UPPP and left ear pain/infection.  4. Encouraged weight loss.  5. Follow up in 8-12weeks with compliance card, sooner if needed. F/u oximetry recommended once acclimated to CPAP.

## 2018-02-01 ENCOUNTER — HOSPITAL ENCOUNTER (OUTPATIENT)
Dept: HOSPITAL 8 - ED | Age: 48
Setting detail: OBSERVATION
LOS: 1 days | Discharge: HOME | End: 2018-02-02
Attending: HOSPITALIST | Admitting: HOSPITALIST
Payer: MEDICARE

## 2018-02-01 VITALS — WEIGHT: 193.35 LBS | HEIGHT: 62 IN | BODY MASS INDEX: 35.58 KG/M2

## 2018-02-01 DIAGNOSIS — J45.909: ICD-10-CM

## 2018-02-01 DIAGNOSIS — E66.9: ICD-10-CM

## 2018-02-01 DIAGNOSIS — Z90.710: ICD-10-CM

## 2018-02-01 DIAGNOSIS — Z87.442: ICD-10-CM

## 2018-02-01 DIAGNOSIS — E03.9: ICD-10-CM

## 2018-02-01 DIAGNOSIS — R07.89: Primary | ICD-10-CM

## 2018-02-01 LAB
ALBUMIN SERPL-MCNC: 3.9 G/DL (ref 3.4–5)
ALP SERPL-CCNC: 111 U/L (ref 45–117)
ALT SERPL-CCNC: 26 U/L (ref 12–78)
ANION GAP SERPL CALC-SCNC: 8 MMOL/L (ref 5–15)
BASOPHILS # BLD AUTO: 0.04 X10^3/UL (ref 0–0.1)
BASOPHILS NFR BLD AUTO: 1 % (ref 0–1)
BILIRUB SERPL-MCNC: 0.6 MG/DL (ref 0.2–1)
CALCIUM SERPL-MCNC: 9 MG/DL (ref 8.5–10.1)
CHLORIDE SERPL-SCNC: 106 MMOL/L (ref 98–107)
CREAT SERPL-MCNC: 0.62 MG/DL (ref 0.55–1.02)
EOSINOPHIL # BLD AUTO: 0.18 X10^3/UL (ref 0–0.4)
EOSINOPHIL NFR BLD AUTO: 2 % (ref 1–7)
ERYTHROCYTE [DISTWIDTH] IN BLOOD BY AUTOMATED COUNT: 13.2 % (ref 9.6–15.2)
LYMPHOCYTES # BLD AUTO: 1.92 X10^3/UL (ref 1–3.4)
LYMPHOCYTES NFR BLD AUTO: 26 % (ref 22–44)
MCH RBC QN AUTO: 28.5 PG (ref 27–34.8)
MCHC RBC AUTO-ENTMCNC: 33.1 G/DL (ref 32.4–35.8)
MCV RBC AUTO: 85.9 FL (ref 80–100)
MD: NO
MONOCYTES # BLD AUTO: 0.35 X10^3/UL (ref 0.2–0.8)
MONOCYTES NFR BLD AUTO: 5 % (ref 2–9)
NEUTROPHILS # BLD AUTO: 5 X10^3/UL (ref 1.8–6.8)
NEUTROPHILS NFR BLD AUTO: 67 % (ref 42–75)
PLATELET # BLD AUTO: 269 X10^3/UL (ref 130–400)
PMV BLD AUTO: 8.5 FL (ref 7.4–10.4)
PROT SERPL-MCNC: 8.4 G/DL (ref 6.4–8.2)
RBC # BLD AUTO: 5.26 X10^6/UL (ref 3.82–5.3)
TROPONIN I SERPL-MCNC: < 0.015 NG/ML (ref 0–0.04)

## 2018-02-01 PROCEDURE — 96360 HYDRATION IV INFUSION INIT: CPT

## 2018-02-01 PROCEDURE — 81003 URINALYSIS AUTO W/O SCOPE: CPT

## 2018-02-01 PROCEDURE — 83880 ASSAY OF NATRIURETIC PEPTIDE: CPT

## 2018-02-01 PROCEDURE — 90471 IMMUNIZATION ADMIN: CPT

## 2018-02-01 PROCEDURE — 99285 EMERGENCY DEPT VISIT HI MDM: CPT

## 2018-02-01 PROCEDURE — 84443 ASSAY THYROID STIM HORMONE: CPT

## 2018-02-01 PROCEDURE — 80053 COMPREHEN METABOLIC PANEL: CPT

## 2018-02-01 PROCEDURE — 96372 THER/PROPH/DIAG INJ SC/IM: CPT

## 2018-02-01 PROCEDURE — C9898 INPNT STAY RADIOLABELED ITEM: HCPCS

## 2018-02-01 PROCEDURE — 84439 ASSAY OF FREE THYROXINE: CPT

## 2018-02-01 PROCEDURE — 85025 COMPLETE CBC W/AUTO DIFF WBC: CPT

## 2018-02-01 PROCEDURE — 83036 HEMOGLOBIN GLYCOSYLATED A1C: CPT

## 2018-02-01 PROCEDURE — 84703 CHORIONIC GONADOTROPIN ASSAY: CPT

## 2018-02-01 PROCEDURE — G0378 HOSPITAL OBSERVATION PER HR: HCPCS

## 2018-02-01 PROCEDURE — 36415 COLL VENOUS BLD VENIPUNCTURE: CPT

## 2018-02-01 PROCEDURE — 90686 IIV4 VACC NO PRSV 0.5 ML IM: CPT

## 2018-02-01 PROCEDURE — 83735 ASSAY OF MAGNESIUM: CPT

## 2018-02-01 PROCEDURE — A9502 TC99M TETROFOSMIN: HCPCS

## 2018-02-01 PROCEDURE — 84484 ASSAY OF TROPONIN QUANT: CPT

## 2018-02-01 PROCEDURE — 96361 HYDRATE IV INFUSION ADD-ON: CPT

## 2018-02-01 PROCEDURE — 93017 CV STRESS TEST TRACING ONLY: CPT

## 2018-02-01 PROCEDURE — 78452 HT MUSCLE IMAGE SPECT MULT: CPT

## 2018-02-01 PROCEDURE — 71045 X-RAY EXAM CHEST 1 VIEW: CPT

## 2018-02-01 PROCEDURE — 93005 ELECTROCARDIOGRAM TRACING: CPT

## 2018-02-02 VITALS — SYSTOLIC BLOOD PRESSURE: 106 MMHG | DIASTOLIC BLOOD PRESSURE: 58 MMHG

## 2018-02-02 VITALS — DIASTOLIC BLOOD PRESSURE: 60 MMHG | SYSTOLIC BLOOD PRESSURE: 119 MMHG

## 2018-02-02 VITALS — DIASTOLIC BLOOD PRESSURE: 69 MMHG | SYSTOLIC BLOOD PRESSURE: 101 MMHG

## 2018-02-02 VITALS — SYSTOLIC BLOOD PRESSURE: 109 MMHG | DIASTOLIC BLOOD PRESSURE: 71 MMHG

## 2018-02-02 LAB
CULTURE INDICATED?: NO
EST. AVERAGE GLUCOSE BLD GHB EST-MCNC: 120 MG/DL (ref 0–126)
HBA1C MFR BLD: 5.8 % (ref 4.2–6.3)
MICROSCOPIC: (no result)
T4 FREE SERPL-MCNC: 1.42 NG/DL (ref 0.76–1.46)
TROPONIN I SERPL-MCNC: < 0.015 NG/ML (ref 0–0.04)
TROPONIN I SERPL-MCNC: < 0.015 NG/ML (ref 0–0.04)
TSH SERPL-ACNC: 0.44 MIU/L (ref 0.36–3.74)

## 2018-02-02 RX ADMIN — HEPARIN SODIUM SCH UNITS: 5000 INJECTION, SOLUTION INTRAVENOUS; SUBCUTANEOUS at 01:04

## 2018-02-02 RX ADMIN — SODIUM CHLORIDE SCH MLS/HR: 0.9 INJECTION, SOLUTION INTRAVENOUS at 10:33

## 2018-02-02 RX ADMIN — HEPARIN SODIUM SCH UNITS: 5000 INJECTION, SOLUTION INTRAVENOUS; SUBCUTANEOUS at 08:24

## 2018-02-02 RX ADMIN — BUDESONIDE AND FORMOTEROL FUMARATE DIHYDRATE SCH PUFF(S): 160; 4.5 AEROSOL RESPIRATORY (INHALATION) at 11:00

## 2018-02-02 RX ADMIN — SODIUM CHLORIDE SCH MLS/HR: 0.9 INJECTION, SOLUTION INTRAVENOUS at 01:03

## 2018-02-02 RX ADMIN — BUDESONIDE AND FORMOTEROL FUMARATE DIHYDRATE SCH PUFF(S): 160; 4.5 AEROSOL RESPIRATORY (INHALATION) at 06:00

## 2019-03-20 NOTE — ED AVS SNAPSHOT
1/20/2017          Gina Scott  [address]  [place] [address]    Dear Gina:    Critical access hospital wants to ensure your discharge home is safe and you or your loved ones have had all your questions answered regarding your care after you leave the hospital.    You may receive a telephone call within two days of your discharge.  This call is to make certain you understand your discharge instructions as well as ensure we provided you with the best care possible during your stay with us.     The call will only last approximately 3-5 minutes and will be done by a nurse.    Once again, we want to ensure your discharge home is safe and that you have a clear understanding of any next steps in your care.  If you have any questions or concerns, please do not hesitate to contact us, we are here for you.  Thank you for choosing [facility] for your healthcare needs.    Sincerely,    Anselmo Campbell    [facility]         99

## 2019-06-28 ENCOUNTER — HOSPITAL ENCOUNTER (OUTPATIENT)
Dept: RADIOLOGY | Facility: MEDICAL CENTER | Age: 49
End: 2019-06-28
Attending: INTERNAL MEDICINE
Payer: MEDICARE

## 2019-06-28 DIAGNOSIS — R07.9 CHEST PAIN, UNSPECIFIED TYPE: ICD-10-CM

## 2019-06-28 PROCEDURE — 71275 CT ANGIOGRAPHY CHEST: CPT

## 2019-06-28 PROCEDURE — 700117 HCHG RX CONTRAST REV CODE 255: Performed by: INTERNAL MEDICINE

## 2019-06-28 RX ADMIN — IOHEXOL 75 ML: 350 INJECTION, SOLUTION INTRAVENOUS at 15:30

## 2019-07-10 ENCOUNTER — APPOINTMENT (OUTPATIENT)
Dept: RADIOLOGY | Facility: MEDICAL CENTER | Age: 49
End: 2019-07-10
Attending: INTERNAL MEDICINE
Payer: MEDICARE

## 2019-12-02 ENCOUNTER — HOSPITAL ENCOUNTER (OUTPATIENT)
Dept: RADIOLOGY | Facility: MEDICAL CENTER | Age: 49
End: 2019-12-02
Attending: PHYSICIAN ASSISTANT
Payer: MEDICARE

## 2019-12-02 ENCOUNTER — APPOINTMENT (OUTPATIENT)
Dept: LAB | Facility: MEDICAL CENTER | Age: 49
End: 2019-12-02
Attending: PHYSICIAN ASSISTANT
Payer: MEDICARE

## 2019-12-02 DIAGNOSIS — M79.672 LEFT FOOT PAIN: ICD-10-CM

## 2019-12-02 PROCEDURE — 73630 X-RAY EXAM OF FOOT: CPT | Mod: LT

## 2020-03-16 ENCOUNTER — HOSPITAL ENCOUNTER (EMERGENCY)
Facility: MEDICAL CENTER | Age: 50
End: 2020-03-17
Attending: EMERGENCY MEDICINE
Payer: MEDICARE

## 2020-03-16 DIAGNOSIS — J06.9 VIRAL URI WITH COUGH: ICD-10-CM

## 2020-03-16 DIAGNOSIS — R10.9 RIGHT FLANK PAIN: ICD-10-CM

## 2020-03-16 PROCEDURE — 81001 URINALYSIS AUTO W/SCOPE: CPT

## 2020-03-16 PROCEDURE — 99284 EMERGENCY DEPT VISIT MOD MDM: CPT

## 2020-03-16 PROCEDURE — 700111 HCHG RX REV CODE 636 W/ 250 OVERRIDE (IP): Performed by: EMERGENCY MEDICINE

## 2020-03-16 RX ORDER — ONDANSETRON 4 MG/1
4 TABLET, ORALLY DISINTEGRATING ORAL ONCE
Status: COMPLETED | OUTPATIENT
Start: 2020-03-16 | End: 2020-03-16

## 2020-03-16 RX ADMIN — ONDANSETRON 4 MG: 4 TABLET, ORALLY DISINTEGRATING ORAL at 23:45

## 2020-03-17 VITALS
OXYGEN SATURATION: 99 % | RESPIRATION RATE: 18 BRPM | WEIGHT: 181 LBS | SYSTOLIC BLOOD PRESSURE: 132 MMHG | HEIGHT: 62 IN | HEART RATE: 80 BPM | DIASTOLIC BLOOD PRESSURE: 87 MMHG | BODY MASS INDEX: 33.31 KG/M2 | TEMPERATURE: 98.1 F

## 2020-03-17 LAB
APPEARANCE UR: ABNORMAL
BILIRUB UR QL STRIP.AUTO: NEGATIVE
CAOX CRY #/AREA URNS HPF: NORMAL /HPF
COLOR UR: YELLOW
GLUCOSE UR STRIP.AUTO-MCNC: NEGATIVE MG/DL
KETONES UR STRIP.AUTO-MCNC: ABNORMAL MG/DL
LEUKOCYTE ESTERASE UR QL STRIP.AUTO: NEGATIVE
MICRO URNS: ABNORMAL
NITRITE UR QL STRIP.AUTO: NEGATIVE
PH UR STRIP.AUTO: 5.5 [PH] (ref 5–8)
PROT UR QL STRIP: NEGATIVE MG/DL
RBC UR QL AUTO: ABNORMAL
SP GR UR STRIP.AUTO: 1.02
UROBILINOGEN UR STRIP.AUTO-MCNC: 1 MG/DL

## 2020-03-17 RX ORDER — ONDANSETRON 4 MG/1
4 TABLET, ORALLY DISINTEGRATING ORAL EVERY 8 HOURS PRN
Qty: 10 TAB | Refills: 0 | Status: SHIPPED | OUTPATIENT
Start: 2020-03-17 | End: 2021-01-07

## 2020-03-17 RX ORDER — BENZONATATE 100 MG/1
100 CAPSULE ORAL 3 TIMES DAILY PRN
Qty: 20 CAP | Refills: 0 | Status: SHIPPED | OUTPATIENT
Start: 2020-03-17 | End: 2021-01-07

## 2020-03-17 NOTE — ED NOTES
Pt to ED 66 c/o R flank pain 4/10 as well as urinary hesitancy and frequency. Pt also c/o dry cough since 3/6 reports subjective fevers. Pt placed in droplet precautions. Pt also reporting NVD x3 days

## 2020-03-17 NOTE — ED PROVIDER NOTES
ED Provider Note    Scribed for Ye Carrasco M.D. by Georgina Ortiz. 3/16/2020, 11:14 PM.    Primary care provider: Deirdre Cedillo N.P.  Means of arrival: Walk-in  History obtained from: Patient  History limited by: None    CHIEF COMPLAINT  Chief Complaint   Patient presents with   • Cough     since Friday 3/6   • Fatigue     since Friday 3/13   • Low Back Pain     Bilateral; x2 days   • Chills     Fever/chills x1 week (temp at home averaged 100*F)       HPI  Gina Scott is a 49 y.o. female who presents to the Emergency Department for a constant non-productive cough onset 10 days ago. Patient endorses associated subjective fever of 100 °F. Patient is also complaining of urinary symptoms onset 2 days ago. She endorses associated dysuria, increased urinary frequency, flank pain, and right upper quadrant pain, but denies any hematuria or vomiting. No alleviating factors attempted. Patient is not up to date on her influenza vaccination. Patient denies any travel or exposure to anyone who has traveled.     Appropriate precautions were taken for contagious disease were taken, including handwashing, gloves, face mask, gown, and room isolation, as needed.    REVIEW OF SYSTEMS  Pertinent positives include cough, fever, dysuria, urinary frequency, flank pain, and right upper quadrant pain. Pertinent negatives include hematuria or vomiting. All other systems negative.    PAST MEDICAL HISTORY   has a past medical history of Asthma, Bronchitis, Chickenpox, Chronic back pain (11/24/2009), ENDOMETRIOSIS (11/24/2009), Grave's disease (11/24/2009), Hypothyroid (11/24/2009), Hypothyroid, Hypothyroidism, Influenza, Kidney stones, Mild intermittent asthma without complication (1/13/2017), Osteoporosis, PND (post-nasal drip), Seizure disorder (HCC), Sleep apnea, Spinal cord stimulator status, Tonsillitis, Venereal disease, and Vitamin d deficiency (11/24/2009).    SURGICAL HISTORY   has a past surgical history  "that includes other orthopedic surgery; abdominal hysterectomy total; appendectomy; laminotomy; cholecystectomy; and laparotomy.    SOCIAL HISTORY  Social History     Tobacco Use   • Smoking status: Former Smoker     Packs/day: 0.15     Years: 22.00     Pack years: 3.30     Types: Cigarettes     Last attempt to quit: 2014     Years since quittin.6   • Smokeless tobacco: Never Used   • Tobacco comment: smoked pack and half a week in 2009   Substance Use Topics   • Alcohol use: No     Alcohol/week: 0.0 oz   • Drug use: No      Social History     Substance and Sexual Activity   Drug Use No       FAMILY HISTORY  Family History   Problem Relation Age of Onset   • Hypertension Mother    • Diabetes Mother    • Hypertension Father    • Diabetes Father    • Cancer Father    • Cancer Maternal Aunt         aunt and cousin have breast cancer   • No Known Problems Sister    • No Known Problems Brother    • No Known Problems Son    • No Known Problems Son    • No Known Problems Son    • Genetic Disorder Neg Hx    • Lung Disease Neg Hx    • Sleep Apnea Neg Hx        CURRENT MEDICATIONS  Home Medications    **Home medications have not yet been reviewed for this encounter**         ALLERGIES  Allergies   Allergen Reactions   • Nuevo Swelling     Mouth swells up     • Darvocet [Propoxyphene N-Apap] Anaphylaxis   • Hctz [Hydrochlorothiazide]    • Pcn [Penicillins]        PHYSICAL EXAM  VITAL SIGNS: /91   Pulse 84   Temp 36.7 °C (98.1 °F) (Oral)   Resp 20   Ht 1.575 m (5' 2\")   Wt 82.1 kg (181 lb)   LMP 1998   SpO2 99%   BMI 33.10 kg/m²     Constitutional: Well developed, Well nourished, mild distress.   HENT: Normocephalic, Atraumatic, Oropharynx moist.   Eyes: Conjunctiva normal, No discharge.   Neck: Supple, No stridor.   Cardiovascular: Normal heart rate, Normal rhythm, No murmurs, equal pulses.   Pulmonary: Normal breath sounds, No respiratory distress, No wheezing, No rales, No " rhonchi.  Chest: No chest wall tenderness or deformity.   Abdomen:Soft, No masses, no rebound, no guarding. Right sided CVA tenderness, negative cisneros's sign.  Back: No CVA tenderness.   Musculoskeletal: No major deformities noted, No tenderness.   Skin: Warm, Dry, No erythema, No rash.   Neurologic: Alert & oriented x 3, Normal motor function,  No focal deficits noted.   Psychiatric: Affect normal, Judgment normal, Mood normal.     LABS  Results for orders placed or performed during the hospital encounter of 03/16/20   URINALYSIS CULTURE, IF INDICATED   Result Value Ref Range    Color Yellow     Character Cloudy (A)     Specific Gravity 1.025 <1.035    Ph 5.5 5.0 - 8.0    Glucose Negative Negative mg/dL    Ketones Trace (A) Negative mg/dL    Protein Negative Negative mg/dL    Bilirubin Negative Negative    Urobilinogen, Urine 1.0 Negative    Nitrite Negative Negative    Leukocyte Esterase Negative Negative    Occult Blood Trace (A) Negative    Micro Urine Req Microscopic    URINE MICROSCOPIC (W/UA)   Result Value Ref Range    Ca Oxalate Crystal Many /hpf     All labs reviewed by me.    COURSE & MEDICAL DECISION MAKING  Pertinent Labs & Imaging studies reviewed. (See chart for details)    11:14 PM - Patient seen and examined at bedside. Patient currently has stable vital signs, and is afebrile. Patient will be treated with Zofran 4 mg. Ordered UA and urinalysis culture to evaluate her symptoms. The differential diagnoses include but are not limited to: UTI, kidney stone, or viral upper respiratory syndrome.    1:11 AM - Patient was seen at bedside. I updated her on a diagnostic results as detailed above, including the diagnosis of a viral URI. She currently has stable vital signs, and is afebrile. She reports feeling improved following medications. I advised her of all return precautions, and that she is now safe for discharge. Patient verbalizes understanding and agreement to this plan of care.     Medical  Decision Making: At this point time I think the patient's right flank pain is likely secondary kidney stones.  Patient states she passes kidney stones almost monthly.  At this point time it does not appear that she has an infected stone.  Her lung exam is otherwise clear I do not hear any sounds of pneumonia do not think a chest x-ray is warranted at this point time.  Patient outside the window for Tamiflu do not think testing for influenza would be beneficial.  Patient does not have any travel.  She does not meet current testing guidelines for coronavirus.    The patient will return for new or worsening symptoms and is stable at the time of discharge.    The patient is referred to a primary physician for blood pressure management, diabetic screening, and for all other preventative health concerns.    DISPOSITION:  Patient will be discharged home in stable condition.    FOLLOW UP:  Dierdre Cedillo N.P.  [address] [facility] [address]  [phone]    Schedule an appointment as soon as possible for a visit in 3 days        OUTPATIENT MEDICATIONS:  New Prescriptions    BENZONATATE (TESSALON) 100 MG CAP    Take 1 Cap by mouth 3 times a day as needed for Cough.    ONDANSETRON (ZOFRAN ODT) 4 MG TABLET DISPERSIBLE    Take 1 Tab by mouth every 8 hours as needed.        FINAL IMPRESSION  1. Right flank pain    2. Viral URI with cough          Georgina CABRERA (Ebenezeribconstantin), am scribing for, and in the presence of, Ye Carrasco M.D.    Electronically signed by: Georgina Ortiz (Ebenezeribconstantin), 3/16/2020    Ye CABRERA M.D. personally performed the services described in this documentation, as scribed by Georgina Ortiz in my presence, and it is both accurate and complete. C.    The note accurately reflects work and decisions made by me.  Ye Carrasco M.D.  3/17/2020  3:25 AM

## 2020-03-17 NOTE — ED TRIAGE NOTES
Chief Complaint   Patient presents with   • Cough     since Friday 3/6   • Fatigue     since Friday 3/13   • Low Back Pain     Bilateral; x2 days   • Chills     Fever/chills x1 week (temp at home averaged 100*F)       Pt ambulatory to triage for above complaint. Pt denies recent travel or contact with COVID-19 patients, to her knowledge. Pt saw PCM last week and was given meds for cough/congestion, no flu or strep testing done.     Pt is alert/oriented and follows commands. Pt speaking in full sentences and responds appropriately to questions. No acute distress noted in triage and respirations are even and unlabored.     Pt placed in lobby and educated on triage process. Pt encouraged to alert staff for any changes in condition.

## 2021-01-07 ENCOUNTER — OFFICE VISIT (OUTPATIENT)
Dept: URGENT CARE | Facility: PHYSICIAN GROUP | Age: 51
End: 2021-01-07
Payer: MEDICARE

## 2021-01-07 VITALS
HEIGHT: 62 IN | SYSTOLIC BLOOD PRESSURE: 98 MMHG | OXYGEN SATURATION: 98 % | RESPIRATION RATE: 16 BRPM | WEIGHT: 180 LBS | HEART RATE: 80 BPM | DIASTOLIC BLOOD PRESSURE: 62 MMHG | BODY MASS INDEX: 33.13 KG/M2 | TEMPERATURE: 97.3 F

## 2021-01-07 DIAGNOSIS — R10.9 RIGHT FLANK PAIN: ICD-10-CM

## 2021-01-07 DIAGNOSIS — R11.0 NAUSEA: ICD-10-CM

## 2021-01-07 DIAGNOSIS — R10.11 RUQ ABDOMINAL PAIN: ICD-10-CM

## 2021-01-07 LAB
APPEARANCE UR: CLEAR
BILIRUB UR STRIP-MCNC: NEGATIVE MG/DL
COLOR UR AUTO: YELLOW
GLUCOSE UR STRIP.AUTO-MCNC: NEGATIVE MG/DL
KETONES UR STRIP.AUTO-MCNC: 40 MG/DL
LEUKOCYTE ESTERASE UR QL STRIP.AUTO: NEGATIVE
NITRITE UR QL STRIP.AUTO: NEGATIVE
PH UR STRIP.AUTO: 5.5 [PH] (ref 5–8)
PROT UR QL STRIP: NEGATIVE MG/DL
RBC UR QL AUTO: NORMAL
SP GR UR STRIP.AUTO: 1.03
UROBILINOGEN UR STRIP-MCNC: 0.2 MG/DL

## 2021-01-07 PROCEDURE — 81002 URINALYSIS NONAUTO W/O SCOPE: CPT | Performed by: FAMILY MEDICINE

## 2021-01-07 PROCEDURE — 99214 OFFICE O/P EST MOD 30 MIN: CPT | Performed by: FAMILY MEDICINE

## 2021-01-07 RX ORDER — KETOROLAC TROMETHAMINE 30 MG/ML
30 INJECTION, SOLUTION INTRAMUSCULAR; INTRAVENOUS ONCE
Status: DISCONTINUED | OUTPATIENT
Start: 2021-01-07 | End: 2021-01-07

## 2021-01-07 RX ORDER — ONDANSETRON 4 MG/1
4 TABLET, ORALLY DISINTEGRATING ORAL EVERY 8 HOURS PRN
Qty: 6 TAB | Refills: 0 | Status: SHIPPED
Start: 2021-01-07 | End: 2021-08-11

## 2021-01-07 RX ORDER — KETOROLAC TROMETHAMINE 30 MG/ML
30 INJECTION, SOLUTION INTRAMUSCULAR; INTRAVENOUS ONCE
Status: COMPLETED | OUTPATIENT
Start: 2021-01-07 | End: 2021-01-07

## 2021-01-07 RX ORDER — ONDANSETRON 4 MG/1
4 TABLET, ORALLY DISINTEGRATING ORAL ONCE
Status: COMPLETED | OUTPATIENT
Start: 2021-01-07 | End: 2021-01-07

## 2021-01-07 RX ADMIN — ONDANSETRON 4 MG: 4 TABLET, ORALLY DISINTEGRATING ORAL at 19:30

## 2021-01-07 RX ADMIN — KETOROLAC TROMETHAMINE 30 MG: 30 INJECTION, SOLUTION INTRAMUSCULAR; INTRAVENOUS at 19:31

## 2021-01-07 ASSESSMENT — ENCOUNTER SYMPTOMS
MYALGIAS: 0
EYE DISCHARGE: 0
NAUSEA: 0
WEIGHT LOSS: 0
VOMITING: 0
EYE REDNESS: 0

## 2021-01-08 NOTE — PROGRESS NOTES
"Subjective:      Gina Scott is a 50 y.o. female who presents with Abdominal Pain (3x URQ Abd pain, feels right under rib. )            3 days right upper quadrant right flank pain.  PMH kidney stone.  No blood in urine.  Pain is waxing and waning.  Severe this evening.  Pain is not positional.  She has had some pain with eating.  She has had a cholecystectomy.  No fever or jaundice.  No dysuria urgency or frequency.  Minimal relief with OTC medication.  No other aggravating or alleviating factors.      Review of Systems   Constitutional: Negative for malaise/fatigue and weight loss.   Eyes: Negative for discharge and redness.   Gastrointestinal: Negative for nausea and vomiting.   Musculoskeletal: Negative for joint pain and myalgias.   Skin: Negative for itching and rash.     .  Medications, Allergies, and current problem list reviewed today in Epic       Objective:     BP (!) 98/62 (BP Location: Left arm, Patient Position: Standing, BP Cuff Size: Adult)   Pulse 80   Temp 36.3 °C (97.3 °F) (Temporal)   Resp 16   Ht 1.575 m (5' 2\")   Wt 81.6 kg (180 lb)   LMP 01/01/1998   SpO2 98%   BMI 32.92 kg/m²      Physical Exam  Constitutional:       General: She is not in acute distress.     Appearance: She is well-developed.   HENT:      Head: Normocephalic and atraumatic.   Eyes:      Conjunctiva/sclera: Conjunctivae normal.   Cardiovascular:      Rate and Rhythm: Normal rate and regular rhythm.      Heart sounds: Normal heart sounds. No murmur.   Pulmonary:      Effort: Pulmonary effort is normal.      Breath sounds: Normal breath sounds. No wheezing.   Abdominal:      General: Bowel sounds are normal.      Palpations: Abdomen is soft.      Tenderness: There is abdominal tenderness (RUQ and right CVA).   Skin:     General: Skin is warm and dry.      Findings: No rash.   Neurological:      Mental Status: She is alert and oriented to person, place, and time.                 Assessment/Plan:      UA " trace lysed blood    1. RUQ abdominal pain  POCT Urinalysis   2. Right flank pain  CT-RENAL COLIC EVALUATION(A/P W/O)    ketorolac (TORADOL) injection 30 mg    DISCONTINUED: ketorolac (TORADOL) injection 30 mg   3. Nausea  ondansetron (ZOFRAN ODT) 4 MG TABLET DISPERSIBLE    ondansetron (ZOFRAN ODT) dispertab 4 mg     Differential diagnosis, natural history, supportive care, and indications for immediate follow-up discussed at length.       I suspect kidney stone although GI etiology such as retained common bile duct stone or pancreatitis are not ruled out.  Because of this I have recommended that she go to the ED this evening as there is no option for imaging or lab at this time.  She declines and will get CT renal colic to start her work-up tomorrow.    I will follow up on this.  She understands to go to the ED with worse pain, fever, or jaundice.

## 2021-08-11 ENCOUNTER — APPOINTMENT (OUTPATIENT)
Dept: RADIOLOGY | Facility: MEDICAL CENTER | Age: 51
DRG: 871 | End: 2021-08-11
Attending: EMERGENCY MEDICINE
Payer: MEDICARE

## 2021-08-11 ENCOUNTER — HOSPITAL ENCOUNTER (INPATIENT)
Facility: MEDICAL CENTER | Age: 51
LOS: 2 days | DRG: 871 | End: 2021-08-13
Attending: EMERGENCY MEDICINE | Admitting: INTERNAL MEDICINE
Payer: MEDICARE

## 2021-08-11 DIAGNOSIS — J12.82 PNEUMONIA DUE TO COVID-19 VIRUS: ICD-10-CM

## 2021-08-11 DIAGNOSIS — U07.1 PNEUMONIA DUE TO COVID-19 VIRUS: ICD-10-CM

## 2021-08-11 DIAGNOSIS — E87.6 HYPOKALEMIA: ICD-10-CM

## 2021-08-11 DIAGNOSIS — R09.02 HYPOXIA: ICD-10-CM

## 2021-08-11 LAB
ALBUMIN SERPL BCP-MCNC: 3.9 G/DL (ref 3.2–4.9)
ALBUMIN/GLOB SERPL: 1.1 G/DL
ALP SERPL-CCNC: 81 U/L (ref 30–99)
ALT SERPL-CCNC: 23 U/L (ref 2–50)
ANION GAP SERPL CALC-SCNC: 16 MMOL/L (ref 7–16)
AST SERPL-CCNC: 33 U/L (ref 12–45)
BASOPHILS # BLD AUTO: 0.2 % (ref 0–1.8)
BASOPHILS # BLD: 0.01 K/UL (ref 0–0.12)
BILIRUB SERPL-MCNC: 0.4 MG/DL (ref 0.1–1.5)
BUN SERPL-MCNC: 13 MG/DL (ref 8–22)
CALCIUM SERPL-MCNC: 8.9 MG/DL (ref 8.5–10.5)
CHLORIDE SERPL-SCNC: 96 MMOL/L (ref 96–112)
CO2 SERPL-SCNC: 23 MMOL/L (ref 20–33)
CREAT SERPL-MCNC: 0.58 MG/DL (ref 0.5–1.4)
EOSINOPHIL # BLD AUTO: 0 K/UL (ref 0–0.51)
EOSINOPHIL NFR BLD: 0 % (ref 0–6.9)
ERYTHROCYTE [DISTWIDTH] IN BLOOD BY AUTOMATED COUNT: 39.3 FL (ref 35.9–50)
GLOBULIN SER CALC-MCNC: 3.5 G/DL (ref 1.9–3.5)
GLUCOSE SERPL-MCNC: 123 MG/DL (ref 65–99)
HCT VFR BLD AUTO: 48.1 % (ref 37–47)
HGB BLD-MCNC: 16.4 G/DL (ref 12–16)
IMM GRANULOCYTES # BLD AUTO: 0.02 K/UL (ref 0–0.11)
IMM GRANULOCYTES NFR BLD AUTO: 0.5 % (ref 0–0.9)
LACTATE BLD-SCNC: 1.4 MMOL/L (ref 0.5–2)
LYMPHOCYTES # BLD AUTO: 1.01 K/UL (ref 1–4.8)
LYMPHOCYTES NFR BLD: 25.1 % (ref 22–41)
MAGNESIUM SERPL-MCNC: 2 MG/DL (ref 1.5–2.5)
MCH RBC QN AUTO: 29.7 PG (ref 27–33)
MCHC RBC AUTO-ENTMCNC: 34.1 G/DL (ref 33.6–35)
MCV RBC AUTO: 87 FL (ref 81.4–97.8)
MONOCYTES # BLD AUTO: 0.25 K/UL (ref 0–0.85)
MONOCYTES NFR BLD AUTO: 6.2 % (ref 0–13.4)
NEUTROPHILS # BLD AUTO: 2.74 K/UL (ref 2–7.15)
NEUTROPHILS NFR BLD: 68 % (ref 44–72)
NRBC # BLD AUTO: 0 K/UL
NRBC BLD-RTO: 0 /100 WBC
PLATELET # BLD AUTO: 119 K/UL (ref 164–446)
PMV BLD AUTO: 10.6 FL (ref 9–12.9)
POTASSIUM SERPL-SCNC: 3.2 MMOL/L (ref 3.6–5.5)
PROT SERPL-MCNC: 7.4 G/DL (ref 6–8.2)
RBC # BLD AUTO: 5.53 M/UL (ref 4.2–5.4)
SODIUM SERPL-SCNC: 135 MMOL/L (ref 135–145)
WBC # BLD AUTO: 4 K/UL (ref 4.8–10.8)

## 2021-08-11 PROCEDURE — 99285 EMERGENCY DEPT VISIT HI MDM: CPT

## 2021-08-11 PROCEDURE — 83605 ASSAY OF LACTIC ACID: CPT

## 2021-08-11 PROCEDURE — 700102 HCHG RX REV CODE 250 W/ 637 OVERRIDE(OP): Performed by: EMERGENCY MEDICINE

## 2021-08-11 PROCEDURE — 36415 COLL VENOUS BLD VENIPUNCTURE: CPT

## 2021-08-11 PROCEDURE — A9270 NON-COVERED ITEM OR SERVICE: HCPCS | Performed by: EMERGENCY MEDICINE

## 2021-08-11 PROCEDURE — 700102 HCHG RX REV CODE 250 W/ 637 OVERRIDE(OP): Performed by: INTERNAL MEDICINE

## 2021-08-11 PROCEDURE — 85025 COMPLETE CBC W/AUTO DIFF WBC: CPT

## 2021-08-11 PROCEDURE — 770006 HCHG ROOM/CARE - MED/SURG/GYN SEMI*

## 2021-08-11 PROCEDURE — A9270 NON-COVERED ITEM OR SERVICE: HCPCS | Performed by: INTERNAL MEDICINE

## 2021-08-11 PROCEDURE — 80053 COMPREHEN METABOLIC PANEL: CPT

## 2021-08-11 PROCEDURE — 87040 BLOOD CULTURE FOR BACTERIA: CPT

## 2021-08-11 PROCEDURE — 83735 ASSAY OF MAGNESIUM: CPT

## 2021-08-11 PROCEDURE — 8E0ZXY6 ISOLATION: ICD-10-PCS | Performed by: INTERNAL MEDICINE

## 2021-08-11 PROCEDURE — 71045 X-RAY EXAM CHEST 1 VIEW: CPT

## 2021-08-11 RX ORDER — BISACODYL 10 MG
10 SUPPOSITORY, RECTAL RECTAL
Status: DISCONTINUED | OUTPATIENT
Start: 2021-08-11 | End: 2021-08-13 | Stop reason: HOSPADM

## 2021-08-11 RX ORDER — HYDRALAZINE HYDROCHLORIDE 20 MG/ML
10 INJECTION INTRAMUSCULAR; INTRAVENOUS EVERY 6 HOURS PRN
Status: DISCONTINUED | OUTPATIENT
Start: 2021-08-11 | End: 2021-08-13 | Stop reason: HOSPADM

## 2021-08-11 RX ORDER — POTASSIUM CHLORIDE 20 MEQ/1
20 TABLET, EXTENDED RELEASE ORAL ONCE
Status: COMPLETED | OUTPATIENT
Start: 2021-08-11 | End: 2021-08-11

## 2021-08-11 RX ORDER — ALBUTEROL SULFATE 90 UG/1
2 AEROSOL, METERED RESPIRATORY (INHALATION) EVERY 6 HOURS PRN
Status: DISCONTINUED | OUTPATIENT
Start: 2021-08-11 | End: 2021-08-13 | Stop reason: HOSPADM

## 2021-08-11 RX ORDER — AMOXICILLIN 250 MG
2 CAPSULE ORAL 2 TIMES DAILY
Status: DISCONTINUED | OUTPATIENT
Start: 2021-08-11 | End: 2021-08-13 | Stop reason: HOSPADM

## 2021-08-11 RX ORDER — ONDANSETRON 2 MG/ML
4 INJECTION INTRAMUSCULAR; INTRAVENOUS EVERY 6 HOURS PRN
Status: DISCONTINUED | OUTPATIENT
Start: 2021-08-11 | End: 2021-08-13 | Stop reason: HOSPADM

## 2021-08-11 RX ORDER — DEXAMETHASONE 4 MG/1
6 TABLET ORAL ONCE
Status: DISCONTINUED | OUTPATIENT
Start: 2021-08-11 | End: 2021-08-11

## 2021-08-11 RX ORDER — DEXAMETHASONE 4 MG/1
6 TABLET ORAL DAILY
Status: DISCONTINUED | OUTPATIENT
Start: 2021-08-11 | End: 2021-08-13 | Stop reason: HOSPADM

## 2021-08-11 RX ORDER — POLYETHYLENE GLYCOL 3350 17 G/17G
1 POWDER, FOR SOLUTION ORAL
Status: DISCONTINUED | OUTPATIENT
Start: 2021-08-11 | End: 2021-08-13 | Stop reason: HOSPADM

## 2021-08-11 RX ORDER — ONDANSETRON 4 MG/1
4 TABLET, ORALLY DISINTEGRATING ORAL EVERY 6 HOURS PRN
Status: DISCONTINUED | OUTPATIENT
Start: 2021-08-11 | End: 2021-08-13 | Stop reason: HOSPADM

## 2021-08-11 RX ORDER — LEVOTHYROXINE SODIUM 112 UG/1
112 TABLET ORAL
Status: DISCONTINUED | OUTPATIENT
Start: 2021-08-12 | End: 2021-08-13 | Stop reason: HOSPADM

## 2021-08-11 RX ADMIN — POTASSIUM CHLORIDE 20 MEQ: 1500 TABLET, EXTENDED RELEASE ORAL at 19:55

## 2021-08-11 RX ADMIN — DEXAMETHASONE 6 MG: 4 TABLET ORAL at 19:55

## 2021-08-11 NOTE — ED TRIAGE NOTES
BIBA from home for increasing SOB after being diagnosed with COVID 10 days ago. RA sat was 90% per EMS, placed on 4L NC and feeling better. Cough noted and rales in RLL.

## 2021-08-12 ENCOUNTER — APPOINTMENT (OUTPATIENT)
Dept: RADIOLOGY | Facility: MEDICAL CENTER | Age: 51
DRG: 871 | End: 2021-08-12
Attending: STUDENT IN AN ORGANIZED HEALTH CARE EDUCATION/TRAINING PROGRAM
Payer: MEDICARE

## 2021-08-12 PROBLEM — J12.82 PNEUMONIA DUE TO COVID-19 VIRUS: Status: ACTIVE | Noted: 2021-08-12

## 2021-08-12 PROBLEM — E87.1 HYPONATREMIA: Status: ACTIVE | Noted: 2021-08-12

## 2021-08-12 PROBLEM — J96.01 ACUTE RESPIRATORY FAILURE WITH HYPOXIA (HCC): Status: ACTIVE | Noted: 2021-08-12

## 2021-08-12 PROBLEM — J96.00 ACUTE RESPIRATORY FAILURE (HCC): Status: ACTIVE | Noted: 2021-08-12

## 2021-08-12 PROBLEM — U07.1 COVID-19: Status: ACTIVE | Noted: 2021-08-12

## 2021-08-12 PROBLEM — R79.89 POSITIVE D DIMER: Status: ACTIVE | Noted: 2021-08-12

## 2021-08-12 PROBLEM — E43 SEVERE PROTEIN-CALORIE MALNUTRITION (HCC): Status: ACTIVE | Noted: 2021-08-12

## 2021-08-12 PROBLEM — E87.6 HYPOKALEMIA: Status: ACTIVE | Noted: 2021-08-12

## 2021-08-12 LAB
ANION GAP SERPL CALC-SCNC: 12 MMOL/L (ref 7–16)
APPEARANCE UR: CLEAR
BACTERIA #/AREA URNS HPF: NEGATIVE /HPF
BASOPHILS # BLD AUTO: 0.4 % (ref 0–1.8)
BASOPHILS # BLD: 0.01 K/UL (ref 0–0.12)
BILIRUB UR QL STRIP.AUTO: NEGATIVE
BUN SERPL-MCNC: 14 MG/DL (ref 8–22)
CALCIUM SERPL-MCNC: 9.2 MG/DL (ref 8.5–10.5)
CHLORIDE SERPL-SCNC: 94 MMOL/L (ref 96–112)
CO2 SERPL-SCNC: 25 MMOL/L (ref 20–33)
COLOR UR: YELLOW
CREAT SERPL-MCNC: 0.6 MG/DL (ref 0.5–1.4)
CRP SERPL HS-MCNC: 4.74 MG/DL (ref 0–0.75)
D DIMER PPP IA.FEU-MCNC: 0.78 UG/ML (FEU) (ref 0–0.5)
EOSINOPHIL # BLD AUTO: 0 K/UL (ref 0–0.51)
EOSINOPHIL NFR BLD: 0 % (ref 0–6.9)
EPI CELLS #/AREA URNS HPF: ABNORMAL /HPF
ERYTHROCYTE [DISTWIDTH] IN BLOOD BY AUTOMATED COUNT: 40.1 FL (ref 35.9–50)
GLUCOSE SERPL-MCNC: 151 MG/DL (ref 65–99)
GLUCOSE UR STRIP.AUTO-MCNC: NEGATIVE MG/DL
HCT VFR BLD AUTO: 49.4 % (ref 37–47)
HGB BLD-MCNC: 16.6 G/DL (ref 12–16)
HYALINE CASTS #/AREA URNS LPF: ABNORMAL /LPF
IMM GRANULOCYTES # BLD AUTO: 0.02 K/UL (ref 0–0.11)
IMM GRANULOCYTES NFR BLD AUTO: 0.8 % (ref 0–0.9)
KETONES UR STRIP.AUTO-MCNC: 15 MG/DL
LEUKOCYTE ESTERASE UR QL STRIP.AUTO: NEGATIVE
LYMPHOCYTES # BLD AUTO: 0.66 K/UL (ref 1–4.8)
LYMPHOCYTES NFR BLD: 25.2 % (ref 22–41)
MAGNESIUM SERPL-MCNC: 2.1 MG/DL (ref 1.5–2.5)
MCH RBC QN AUTO: 29.9 PG (ref 27–33)
MCHC RBC AUTO-ENTMCNC: 33.6 G/DL (ref 33.6–35)
MCV RBC AUTO: 88.8 FL (ref 81.4–97.8)
MICRO URNS: ABNORMAL
MONOCYTES # BLD AUTO: 0.2 K/UL (ref 0–0.85)
MONOCYTES NFR BLD AUTO: 7.6 % (ref 0–13.4)
NEUTROPHILS # BLD AUTO: 1.73 K/UL (ref 2–7.15)
NEUTROPHILS NFR BLD: 66 % (ref 44–72)
NITRITE UR QL STRIP.AUTO: NEGATIVE
NRBC # BLD AUTO: 0 K/UL
NRBC BLD-RTO: 0 /100 WBC
PH UR STRIP.AUTO: 6 [PH] (ref 5–8)
PLATELET # BLD AUTO: 116 K/UL (ref 164–446)
PMV BLD AUTO: 11 FL (ref 9–12.9)
POTASSIUM SERPL-SCNC: 3.7 MMOL/L (ref 3.6–5.5)
PROT UR QL STRIP: 300 MG/DL
RBC # BLD AUTO: 5.56 M/UL (ref 4.2–5.4)
RBC # URNS HPF: ABNORMAL /HPF
RBC UR QL AUTO: NEGATIVE
SODIUM SERPL-SCNC: 131 MMOL/L (ref 135–145)
SP GR UR STRIP.AUTO: 1.03
UROBILINOGEN UR STRIP.AUTO-MCNC: 0.2 MG/DL
WBC # BLD AUTO: 2.6 K/UL (ref 4.8–10.8)
WBC #/AREA URNS HPF: ABNORMAL /HPF

## 2021-08-12 PROCEDURE — 71275 CT ANGIOGRAPHY CHEST: CPT | Mod: ME

## 2021-08-12 PROCEDURE — 700102 HCHG RX REV CODE 250 W/ 637 OVERRIDE(OP): Performed by: STUDENT IN AN ORGANIZED HEALTH CARE EDUCATION/TRAINING PROGRAM

## 2021-08-12 PROCEDURE — 770001 HCHG ROOM/CARE - MED/SURG/GYN PRIV*

## 2021-08-12 PROCEDURE — 700102 HCHG RX REV CODE 250 W/ 637 OVERRIDE(OP): Performed by: INTERNAL MEDICINE

## 2021-08-12 PROCEDURE — 36415 COLL VENOUS BLD VENIPUNCTURE: CPT

## 2021-08-12 PROCEDURE — A9270 NON-COVERED ITEM OR SERVICE: HCPCS | Performed by: STUDENT IN AN ORGANIZED HEALTH CARE EDUCATION/TRAINING PROGRAM

## 2021-08-12 PROCEDURE — 93970 EXTREMITY STUDY: CPT | Mod: 26 | Performed by: INTERNAL MEDICINE

## 2021-08-12 PROCEDURE — A9270 NON-COVERED ITEM OR SERVICE: HCPCS | Performed by: INTERNAL MEDICINE

## 2021-08-12 PROCEDURE — 86140 C-REACTIVE PROTEIN: CPT

## 2021-08-12 PROCEDURE — 80048 BASIC METABOLIC PNL TOTAL CA: CPT

## 2021-08-12 PROCEDURE — 85025 COMPLETE CBC W/AUTO DIFF WBC: CPT

## 2021-08-12 PROCEDURE — 99223 1ST HOSP IP/OBS HIGH 75: CPT | Mod: AI | Performed by: INTERNAL MEDICINE

## 2021-08-12 PROCEDURE — 85379 FIBRIN DEGRADATION QUANT: CPT

## 2021-08-12 PROCEDURE — 94640 AIRWAY INHALATION TREATMENT: CPT

## 2021-08-12 PROCEDURE — 93970 EXTREMITY STUDY: CPT

## 2021-08-12 PROCEDURE — 700117 HCHG RX CONTRAST REV CODE 255: Performed by: STUDENT IN AN ORGANIZED HEALTH CARE EDUCATION/TRAINING PROGRAM

## 2021-08-12 PROCEDURE — 700111 HCHG RX REV CODE 636 W/ 250 OVERRIDE (IP): Performed by: INTERNAL MEDICINE

## 2021-08-12 PROCEDURE — 81001 URINALYSIS AUTO W/SCOPE: CPT

## 2021-08-12 PROCEDURE — 83735 ASSAY OF MAGNESIUM: CPT

## 2021-08-12 RX ORDER — POTASSIUM CHLORIDE 20 MEQ/1
40 TABLET, EXTENDED RELEASE ORAL EVERY 4 HOURS
Status: COMPLETED | OUTPATIENT
Start: 2021-08-12 | End: 2021-08-12

## 2021-08-12 RX ORDER — GUAIFENESIN/DEXTROMETHORPHAN 100-10MG/5
5 SYRUP ORAL EVERY 6 HOURS PRN
Status: DISCONTINUED | OUTPATIENT
Start: 2021-08-12 | End: 2021-08-13 | Stop reason: HOSPADM

## 2021-08-12 RX ORDER — ALBUTEROL SULFATE 90 UG/1
2 AEROSOL, METERED RESPIRATORY (INHALATION)
Status: DISCONTINUED | OUTPATIENT
Start: 2021-08-12 | End: 2021-08-13 | Stop reason: HOSPADM

## 2021-08-12 RX ORDER — POTASSIUM CHLORIDE 20 MEQ/1
40 TABLET, EXTENDED RELEASE ORAL EVERY 4 HOURS
Status: DISCONTINUED | OUTPATIENT
Start: 2021-08-12 | End: 2021-08-12

## 2021-08-12 RX ORDER — AZITHROMYCIN 250 MG/1
250-500 TABLET, FILM COATED ORAL DAILY
Status: ON HOLD | COMMUNITY
Start: 2021-08-04 | End: 2021-08-13

## 2021-08-12 RX ADMIN — ALBUTEROL SULFATE 2 PUFF: 90 AEROSOL, METERED RESPIRATORY (INHALATION) at 13:54

## 2021-08-12 RX ADMIN — ALBUTEROL SULFATE 2 PUFF: 90 AEROSOL, METERED RESPIRATORY (INHALATION) at 08:47

## 2021-08-12 RX ADMIN — ENOXAPARIN SODIUM 40 MG: 40 INJECTION SUBCUTANEOUS at 05:21

## 2021-08-12 RX ADMIN — ALBUTEROL SULFATE 2 PUFF: 90 AEROSOL, METERED RESPIRATORY (INHALATION) at 16:43

## 2021-08-12 RX ADMIN — GUAIFENESIN AND DEXTROMETHORPHAN 5 ML: 100; 10 SYRUP ORAL at 23:21

## 2021-08-12 RX ADMIN — ALBUTEROL SULFATE 2 PUFF: 90 AEROSOL, METERED RESPIRATORY (INHALATION) at 19:40

## 2021-08-12 RX ADMIN — LEVOTHYROXINE SODIUM 112 MCG: 0.11 TABLET ORAL at 05:21

## 2021-08-12 RX ADMIN — POTASSIUM CHLORIDE 40 MEQ: 20 TABLET, EXTENDED RELEASE ORAL at 13:54

## 2021-08-12 RX ADMIN — IOHEXOL 50 ML: 350 INJECTION, SOLUTION INTRAVENOUS at 17:00

## 2021-08-12 RX ADMIN — GUAIFENESIN AND DEXTROMETHORPHAN 5 ML: 100; 10 SYRUP ORAL at 13:55

## 2021-08-12 RX ADMIN — POTASSIUM CHLORIDE 40 MEQ: 20 TABLET, EXTENDED RELEASE ORAL at 08:47

## 2021-08-12 RX ADMIN — ALBUTEROL SULFATE 2 PUFF: 90 AEROSOL, METERED RESPIRATORY (INHALATION) at 23:04

## 2021-08-12 ASSESSMENT — LIFESTYLE VARIABLES
HOW MANY TIMES IN THE PAST YEAR HAVE YOU HAD 5 OR MORE DRINKS IN A DAY: 0
TOTAL SCORE: 0
CONSUMPTION TOTAL: NEGATIVE
HAVE YOU EVER FELT YOU SHOULD CUT DOWN ON YOUR DRINKING: NO
TOTAL SCORE: 0
ALCOHOL_USE: NO
ON A TYPICAL DAY WHEN YOU DRINK ALCOHOL HOW MANY DRINKS DO YOU HAVE: 0
AVERAGE NUMBER OF DAYS PER WEEK YOU HAVE A DRINK CONTAINING ALCOHOL: 0
TOTAL SCORE: 0
HAVE PEOPLE ANNOYED YOU BY CRITICIZING YOUR DRINKING: NO
EVER FELT BAD OR GUILTY ABOUT YOUR DRINKING: NO
DOES PATIENT WANT TO STOP DRINKING: CANNOT ASSESS
EVER HAD A DRINK FIRST THING IN THE MORNING TO STEADY YOUR NERVES TO GET RID OF A HANGOVER: NO

## 2021-08-12 ASSESSMENT — ENCOUNTER SYMPTOMS
WEIGHT LOSS: 0
STRIDOR: 0
BRUISES/BLEEDS EASILY: 0
NAUSEA: 0
MYALGIAS: 0
VOMITING: 0
DEPRESSION: 0
DOUBLE VISION: 0
HEADACHES: 0
HEMOPTYSIS: 0
DIZZINESS: 0
FEVER: 0
BLURRED VISION: 0
SORE THROAT: 0
SPEECH CHANGE: 0
NECK PAIN: 0
COUGH: 1
SHORTNESS OF BREATH: 1
PALPITATIONS: 0
FEVER: 1
MYALGIAS: 1
INSOMNIA: 0
SENSORY CHANGE: 0

## 2021-08-12 ASSESSMENT — FIBROSIS 4 INDEX: FIB4 SCORE: 2.83

## 2021-08-12 ASSESSMENT — PATIENT HEALTH QUESTIONNAIRE - PHQ9
1. LITTLE INTEREST OR PLEASURE IN DOING THINGS: NOT AT ALL
SUM OF ALL RESPONSES TO PHQ9 QUESTIONS 1 AND 2: 0
2. FEELING DOWN, DEPRESSED, IRRITABLE, OR HOPELESS: NOT AT ALL

## 2021-08-12 ASSESSMENT — PAIN DESCRIPTION - PAIN TYPE: TYPE: ACUTE PAIN

## 2021-08-12 NOTE — CARE PLAN
The patient is Stable - Low risk of patient condition declining or worsening    Shift Goals  Clinical Goals: Keep o2 sat above 90%  Patient Goals: caugh control    Progress made toward(s) clinical / shift goals:  Encouraged on deep breathing exercise and the use of incentive spirometer,  on 2L of O2, sat  95%     Information regarding disease process/condition explained,question answered.  Updated with plan of care, verbalized understanding.

## 2021-08-12 NOTE — PROGRESS NOTES
Hospital Medicine Daily Progress Note    Date of Service  8/12/2021    Chief Complaint  Gina Scott is a 51 y.o. female admitted 8/11/2021 with worsening shortness of breath    Hospital Course  51 female with past medical history of asthma, diabetes, obstructive sleep apnea, recently diagnosed with COVID-19 infection on 10/4/2021 presented with worsening shortness of breath. Chest x-ray noted with mild bibasilar opacity. On arrival to ED patient noted to be tachypneic desaturating 87% on room air. Oxygen saturation improved over 90 on 2 L of cannula. Admitted for acute hypoxic respiratory failure in setting of COVID-19 infection  Interval Problem Update  8/12/2021  Vitals remained stable  Currently requiring 2 L oxygen to maintain saturation over 90  Labs reviewed.  Elevated CRP. Elevated' D-dimer 0.78  We will get DVT studies  If negative get CT angios to rule out PE  Continue on Decadron    I have personally seen and examined the patient at bedside. I discussed the plan of care with patient.    Consultants/Specialty  N/A    Code Status  Full Code    Disposition  Patient is not medically cleared.   Anticipate discharge to to home with close outpatient follow-up.  I have placed the appropriate orders for post-discharge needs.    Review of Systems  Review of Systems   Constitutional: Positive for malaise/fatigue. Negative for fever.   Respiratory: Positive for cough and shortness of breath.    Cardiovascular: Negative for chest pain.   Gastrointestinal: Negative for nausea and vomiting.   Genitourinary: Negative for dysuria.   Musculoskeletal: Negative for myalgias.   Skin: Negative for rash.   Neurological: Negative for dizziness, sensory change, speech change and headaches.   Psychiatric/Behavioral: Negative for depression.        Physical Exam  Temp:  [36.1 °C (97 °F)-37.2 °C (99 °F)] 36.1 °C (97 °F)  Pulse:  [] 74  Resp:  [11-31] 17  BP: ()/(53-74) 104/67  SpO2:  [88 %-99 %] 93  %    Physical Exam  Constitutional:       Appearance: She is obese.   HENT:      Head: Normocephalic and atraumatic.      Right Ear: Tympanic membrane normal.      Left Ear: Tympanic membrane normal.      Nose: Nose normal.      Mouth/Throat:      Mouth: Mucous membranes are moist.   Eyes:      Pupils: Pupils are equal, round, and reactive to light.   Cardiovascular:      Rate and Rhythm: Normal rate and regular rhythm.      Pulses: Normal pulses.      Heart sounds: Normal heart sounds.   Pulmonary:      Effort: Pulmonary effort is normal. No respiratory distress.      Breath sounds: Rhonchi present.   Abdominal:      General: Bowel sounds are normal. There is no distension.   Musculoskeletal:      Right lower leg: No edema.      Left lower leg: No edema.   Skin:     General: Skin is warm.   Neurological:      General: No focal deficit present.      Mental Status: She is alert and oriented to person, place, and time.   Psychiatric:         Mood and Affect: Mood normal.         Fluids  No intake or output data in the 24 hours ending 08/12/21 1322    Laboratory  Recent Labs     08/11/21  1713 08/12/21  0735   WBC 4.0* 2.6*   RBC 5.53* 5.56*   HEMOGLOBIN 16.4* 16.6*   HEMATOCRIT 48.1* 49.4*   MCV 87.0 88.8   MCH 29.7 29.9   MCHC 34.1 33.6   RDW 39.3 40.1   PLATELETCT 119* 116*   MPV 10.6 11.0     Recent Labs     08/11/21  1713 08/12/21  0735   SODIUM 135 131*   POTASSIUM 3.2* 3.7   CHLORIDE 96 94*   CO2 23 25   GLUCOSE 123* 151*   BUN 13 14   CREATININE 0.58 0.60   CALCIUM 8.9 9.2                   Imaging  DX-CHEST-PORTABLE (1 VIEW)   Final Result      Mild bibasilar opacities which could represent atelectasis or mild pneumonitis.      US-EXTREMITY VENOUS LOWER BILAT    (Results Pending)        Assessment/Plan  * Pneumonia due to COVID-19 virus  Assessment & Plan      COVID Isolation  currently on nasal cannula 2l    CXR bilateral interstitial infiltrates-    Continue supportive care with oxygen supplementation,  proning, judicious use of IV fluids,  incentive spirometry  dexamethasone 6 mg PO daily   currently does not meet criteria for remdesivi  If patient develops respiratory distress recommend HFNC if possible.  If patient becomes hypotensive avoid excessive fluid resuscitation- suggest starting pressors early.  Vital signs as per unit protocol  Monitor inflammatory markers every 48 hours as indicated  Labs in the morning - CBC, BMP,LFTS  DVT Prophylaxis: Lovenox 40 mg once daily(Prophylactic anticoagulation not recommended unless high risk )  Home O2 evaluation      Hyponatremia  Assessment & Plan  In setting of COVID-19 infection  Continue monitor closely    Positive D dimer  Assessment & Plan  Likely in setting of Covid infection.  We will get DVT study.  If DVT negative we will get CT angio chest to r/o PE      Severe protein-calorie malnutrition (HCC)  Assessment & Plan  In setting of Covid - 19 infection   Dietitian following    Hypokalemia  Assessment & Plan  Potassium repletion, follow-up magnesium and basic metabolic panel    Acute respiratory failure with hypoxia (HCC)  Assessment & Plan  Complicated by Covid and obstructive sleep apnea.  Covid care set, Decadron, supplemental oxygen, high flow as needed    Obstructive sleep apnea- (present on admission)  Assessment & Plan  CPAP ordered       VTE prophylaxis: SCDs/TEDs and enoxaparin ppx    I have performed a physical exam and reviewed and updated ROS and Plan today (8/12/2021). In review of yesterday's note (8/11/2021), there are no changes except as documented above.

## 2021-08-12 NOTE — ED PROVIDER NOTES
ED Provider Note    CHIEF COMPLAINT  Chief Complaint   Patient presents with   • Shortness of Breath     BIBA from home for increasing SOB after being diagnosed with COVID 10 days ago. RA sat was 90% per EMS, placed on 4L NC and feeling better. Cough noted and rales in RLL.        HPI  Gina Scott is a 51 y.o. female with history of asthma, Graves' disease, hypothyroidism, kidney stones, seizure disorder, sleep apnea, who presents with complaints of fever, chills, headache, body aches and pains for the past 10 days.  The patient says she had a Covid test about a week ago which was positive.  She has lost her sense of taste and smell.  She has had decreased appetite, with nausea, but denies any vomiting or diarrhea.  She has noticed increased shortness of breath especially with exertional activity over the past several days.  Her cough has been dry, mostly nonproductive.  Both her sons have tested positive for Covid, and developed the symptoms after the patient.    REVIEW OF SYSTEMS  See HPI for further details. All other systems are negative.     PAST MEDICAL HISTORY  Past Medical History:   Diagnosis Date   • Asthma    • Bronchitis    • Chickenpox    • Chronic back pain 11/24/2009   • ENDOMETRIOSIS 11/24/2009   • Grave's disease 11/24/2009   • Hypothyroid 11/24/2009   • Hypothyroid    • Hypothyroidism    • Influenza    • Kidney stones    • Mild intermittent asthma without complication 1/13/2017   • Osteoporosis    • PND (post-nasal drip)    • Seizure disorder (HCC)    • Sleep apnea    • Spinal cord stimulator status    • Tonsillitis    • Venereal disease    • Vitamin d deficiency 11/24/2009       FAMILY HISTORY  Family History   Problem Relation Age of Onset   • Hypertension Mother    • Diabetes Mother    • Hypertension Father    • Diabetes Father    • Cancer Father    • Cancer Maternal Aunt         aunt and cousin have breast cancer   • No Known Problems Sister    • No Known Problems Brother    • No  "Known Problems Son    • No Known Problems Son    • No Known Problems Son    • Genetic Disorder Neg Hx    • Lung Disease Neg Hx    • Sleep Apnea Neg Hx        SOCIAL HISTORY  Social History     Tobacco Use   • Smoking status: Former Smoker     Packs/day: 0.15     Years: 22.00     Pack years: 3.30     Types: Cigarettes     Quit date: 2014     Years since quittin.0   • Smokeless tobacco: Never Used   • Tobacco comment: smoked pack and half a week in 2009   Vaping Use   • Vaping Use: Never used   Substance Use Topics   • Alcohol use: No     Alcohol/week: 0.0 oz   • Drug use: No      Social History     Substance and Sexual Activity   Drug Use No       SURGICAL HISTORY  Past Surgical History:   Procedure Laterality Date   • ABDOMINAL HYSTERECTOMY TOTAL      ovarian  cyst endometriosis   • APPENDECTOMY     • CHOLECYSTECTOMY     • LAMINOTOMY      fusion L5   • LAPAROTOMY     • OTHER ORTHOPEDIC SURGERY      fusion       CURRENT MEDICATIONS  Home Medications     Reviewed by David Nguyen PhT (Pharmacy Tech) on 21 at 1930  Med List Status: Partial    Medication Last Dose Status    albuterol (VENTOLIN OR PROVENTIL) 108 (90 BASE) MCG/ACT AERS inhalation aerosol 2021 Active    levothyroxine (SYNTHROID) 112 MCG Tab 2021 Active    NON SPECIFIED unk Active                ALLERGIES  Allergies   Allergen Reactions   • [place] Swelling     Mouth swells up     • Darvocet [Propoxyphene N-Apap] Anaphylaxis   • Hctz [Hydrochlorothiazide]    • Pcn [Penicillins]        PHYSICAL EXAM0  VITAL SIGNS: Blood Pressure 106/66   Pulse 97   Temperature 37.2 °C (99 °F) (Oral)   Respiration (Abnormal) 21   Height 1.575 m (5' 2\")   Weight 81.6 kg (180 lb)   Last Menstrual Period 1998   Oxygen Saturation 91%   Body Mass Index 32.92 kg/m²   Constitutional: Awake, alert, in no acute distress, Non-toxic appearance.  Occasional dry hacking cough.  HENT: Atraumatic. Bilateral external ears normal, mucous " "membranes moist, throat nonerythematous without exudates, nose is normal.  Eyes: PERRL, EOMI, conjunctiva moist, noninjected.  Neck: Nontender, Normal range of motion, No nuchal rigidity, No stridor.   Lymphatic: No lymphadenopathy noted.   Cardiovascular: Regular rate and rhythm, no murmurs, rubs, gallops.  Thorax & Lungs:  Good breath sounds bilaterally, no wheezes, rales, or retractions.  No chest tenderness.  Abdomen: Bowel sounds normal, Soft, nontender, nondistended, no rebound, guarding, masses.  Back: No CVA or spinal tenderness.  Extremities: Intact distal pulses, No edema, No tenderness.   Skin: Warm, Dry, No rashes.   Musculoskeletal: No joint swelling or tenderness.  Neurologic: Alert & oriented x 3, sensory and motor function normal. No focal deficits.   Psychiatric: Affect normal, Judgment normal, Mood normal.         LABS  Labs Reviewed   CBC WITH DIFFERENTIAL - Abnormal; Notable for the following components:       Result Value    WBC 4.0 (*)     RBC 5.53 (*)     Hemoglobin 16.4 (*)     Hematocrit 48.1 (*)     Platelet Count 119 (*)     All other components within normal limits   COMP METABOLIC PANEL - Abnormal; Notable for the following components:    Potassium 3.2 (*)     Glucose 123 (*)     All other components within normal limits   LACTIC ACID   BLOOD CULTURE    Narrative:     Per Hospital Policy: Only change Specimen Src: to \"Line\" if  specified by physician order.   BLOOD CULTURE    Narrative:     Per Hospital Policy: Only change Specimen Src: to \"Line\" if  specified by physician order.   ESTIMATED GFR   LACTIC ACID   LACTIC ACID   URINALYSIS       All labs reviewed by me.      RADIOLOGY/PROCEDURES  DX-CHEST-PORTABLE (1 VIEW)   Final Result      Mild bibasilar opacities which could represent atelectasis or mild pneumonitis.          The radiologist's interpretations of all radiological studies have been reviewed by me.        COURSE & MEDICAL DECISION MAKING  Pertinent Labs & Imaging studies " reviewed. (See chart for details)  The patient presents with the above complaints.  Initially she was noted to be 90% on room air.  She was placed on supplemental oxygen by nursing.  Chest x-ray showed mild bibasilar opacities which could represent atelectasis or mild pneumonitis per radiology.  CBC showed a white count of 4000, hemoglobin 16.4, normal differential, chemistry shows a potassium 3.2, glucose 123, otherwise normal.  Lactic acid normal.  Discussed with the patient.  She was taken off oxygen, and within several minutes dropped down to 86% on room air and was placed back on oxygen.  Given her hypoxia she will be admitted.  She was given a dose of Decadron, and a dose of potassium chloride for hypokalemia.  Case was discussed with Dr. Stephens the hospitalist.    FINAL IMPRESSION  1. Pneumonia due to COVID-19 virus    2. Hypoxia    3. Hypokalemia          Electronically signed by: Antelmo Urban M.D., 8/11/2021 5:15 PM

## 2021-08-12 NOTE — PROGRESS NOTES
Med rec has been updated and completed per pt's home pharmacy   Per pharmacy pt started a Z-Jacob on 8/4/2021

## 2021-08-12 NOTE — PROGRESS NOTES
Pt to transfer to [facility]. Report given to RN. All questions answered. Transport here to assist with transfer. All belongings with patient.

## 2021-08-12 NOTE — DISCHARGE PLANNING
Anticipated Discharge Disposition: Home with 02.    Action: Attempted to reach pt via phone to discuss home 02, pt did not answer, Bedside RN discussed with pt and referral made to [facility]. Called [facility]. Planning for discharge tomorrow.[facility]ia will deliver tank tonight as she is off tomorrow and wkend.    Barriers to Discharge: pending med clearance and home 02.    Plan: F/U tomorrow.

## 2021-08-12 NOTE — ASSESSMENT & PLAN NOTE
Complicated by Covid and obstructive sleep apnea.  Covid care set, Decadron, supplemental oxygen, high flow as needed

## 2021-08-12 NOTE — ED NOTES
Med Rec completed: per pt at bedside     Pt reported using an antibiotic w/i the last 30 days, unable to specify drug or dose. Phone call will be made to home pharmacy to verify this information tomorrow morning, when they open.     Preferred Pharmacy: [facility] [phone]    Pt confirmed following allergies:  Allergies   Allergen Reactions   • [place] Swelling     Mouth swells up     • Darvocet [Propoxyphene N-Apap] Anaphylaxis   • Hctz [Hydrochlorothiazide]    • Pcn [Penicillins]         Pt's home medications:   Medication Sig   • levothyroxine (SYNTHROID) 112 MCG Tab Take 112 mcg by mouth.   • albuterol (VENTOLIN OR PROVENTIL) 108 (90 BASE) MCG/ACT AERS inhalation aerosol Inhale 2 Puffs by mouth every 6 hours as needed for Shortness of Breath.       Removed medications:   Medication Removal Reason   • [DISCONTINUED] ondansetron (ZOFRAN ODT) 4 MG TABLET DISPERSIBLE Pt reports not taking

## 2021-08-12 NOTE — H&P
Hospital Medicine History & Physical Note    Date of Service  8/12/2021    Primary Care Physician  Pcp Pt States None    Consultants    Code Status  Full Code    Chief Complaint  Chief Complaint   Patient presents with   • Shortness of Breath     BIBA from home for increasing SOB after being diagnosed with COVID 10 days ago. RA sat was 90% per EMS, placed on 4L NC and feeling better. Cough noted and rales in RLL.        History of Presenting Illness  Gina Scott is a 51 y.o. female with history of obstructive sleep apnea, asthma, and iatrogenic hypothyroidism who presented 8/11/2021 with 2 weeks of shortness of breath, nonproductive cough and arthralgia she was diagnosed with Covid 10 days ago.   She has lost her sense of taste and smell.  She has had decreased appetite, with nausea, but denies any vomiting or diarrhea.  She has noticed increased shortness of breath especially with exertional activity over the past several days.  Her cough has been dry, mostly nonproductive.  Both her sons have tested positive for Covid, and developed the symptoms after the patient.  The emergency department she is found to have hypoxia with tachypnea and pulse oximetry of 87% on room air.  She is started on Decadron and referred to the hospitalist for admission    I discussed the plan of care with patient.    Review of Systems  Review of Systems   Constitutional: Positive for fever and malaise/fatigue. Negative for weight loss.   HENT: Negative for sore throat and tinnitus.    Eyes: Negative for blurred vision and double vision.   Respiratory: Positive for cough and shortness of breath. Negative for hemoptysis and stridor.    Cardiovascular: Negative for chest pain and palpitations.   Gastrointestinal: Negative for nausea and vomiting.   Genitourinary: Negative for dysuria and urgency.   Musculoskeletal: Positive for myalgias. Negative for neck pain.   Skin: Negative for itching and rash.   Neurological: Negative for  dizziness and headaches.   Endo/Heme/Allergies: Does not bruise/bleed easily.   Psychiatric/Behavioral: Negative for depression. The patient does not have insomnia.        Past Medical History   has a past medical history of Asthma, Bronchitis, Chickenpox, Chronic back pain (11/24/2009), ENDOMETRIOSIS (11/24/2009), Grave's disease (11/24/2009), Hypothyroid (11/24/2009), Hypothyroid, Hypothyroidism, Influenza, Kidney stones, Mild intermittent asthma without complication (1/13/2017), Osteoporosis, PND (post-nasal drip), Seizure disorder (HCC), Sleep apnea, Spinal cord stimulator status, Tonsillitis, Venereal disease, and Vitamin d deficiency (11/24/2009).    Surgical History   has a past surgical history that includes other orthopedic surgery; abdominal hysterectomy total; appendectomy; laminotomy; cholecystectomy; and laparotomy.     Family History  family history includes Cancer in her father and maternal aunt; Diabetes in her father and mother; Hypertension in her father and mother; No Known Problems in her brother, sister, son, son, and son.   Family history reviewed with patient. There is no family history that is pertinent to the chief complaint.     Social History   reports that she quit smoking about 7 years ago. Her smoking use included cigarettes. She has a 3.30 pack-year smoking history. She has never used smokeless tobacco. She reports that she does not drink alcohol and does not use drugs.    Allergies  Allergies   Allergen Reactions   • Nellis Swelling     Mouth swells up     • Darvocet [Propoxyphene N-Apap] Anaphylaxis   • Hctz [Hydrochlorothiazide]    • Pcn [Penicillins]        Medications  Prior to Admission Medications   Prescriptions Last Dose Informant Patient Reported? Taking?   NON SPECIFIED unk at unk  Yes Yes   Sig: Unknown Antibiotic recently taken by pt, phone call to pharmacy will be made to verify drug/dosing.   albuterol (VENTOLIN OR PROVENTIL) 108 (90 BASE) MCG/ACT AERS inhalation aerosol  8/11/2021 at AM  No No   Sig: Inhale 2 Puffs by mouth every 6 hours as needed for Shortness of Breath.   levothyroxine (SYNTHROID) 112 MCG Tab 8/11/2021 at AM  Yes No   Sig: Take 112 mcg by mouth.      Facility-Administered Medications: None       Physical Exam  Temp:  [37.2 °C (99 °F)] 37.2 °C (99 °F)  Pulse:  [] 87  Resp:  [11-31] 21  BP: (104-118)/(53-74) 104/55  SpO2:  [88 %-99 %] 97 %    Physical Exam  Vitals and nursing note reviewed.   Constitutional:       General: She is in acute distress.      Appearance: Normal appearance. She is obese. She is ill-appearing. She is not toxic-appearing.   HENT:      Head: Normocephalic and atraumatic.      Nose: Nose normal. No congestion or rhinorrhea.      Mouth/Throat:      Mouth: Mucous membranes are moist.      Pharynx: Oropharynx is clear.   Eyes:      Extraocular Movements: Extraocular movements intact.      Conjunctiva/sclera: Conjunctivae normal.      Pupils: Pupils are equal, round, and reactive to light.   Neck:      Vascular: No carotid bruit.   Cardiovascular:      Rate and Rhythm: Normal rate and regular rhythm.      Pulses: Normal pulses.      Heart sounds: Normal heart sounds. No murmur heard.   No gallop.    Pulmonary:      Effort: No respiratory distress.      Breath sounds: Rhonchi and rales present. No wheezing.      Comments: Tachypnea with retractions  Abdominal:      General: Abdomen is flat. Bowel sounds are normal. There is no distension.      Palpations: Abdomen is soft. There is no mass.      Tenderness: There is no abdominal tenderness.      Hernia: No hernia is present.   Musculoskeletal:         General: No tenderness or signs of injury.      Cervical back: Normal range of motion and neck supple. No muscular tenderness.   Lymphadenopathy:      Cervical: No cervical adenopathy.   Skin:     Capillary Refill: Capillary refill takes less than 2 seconds.      Coloration: Skin is not jaundiced or pale.      Findings: No bruising.    Neurological:      General: No focal deficit present.      Mental Status: She is alert and oriented to person, place, and time. Mental status is at baseline.      Cranial Nerves: No cranial nerve deficit.      Motor: No weakness.      Coordination: Coordination normal.   Psychiatric:         Mood and Affect: Mood normal.         Thought Content: Thought content normal.         Judgment: Judgment normal.         Laboratory:  Recent Labs     08/11/21  1713   WBC 4.0*   RBC 5.53*   HEMOGLOBIN 16.4*   HEMATOCRIT 48.1*   MCV 87.0   MCH 29.7   MCHC 34.1   RDW 39.3   PLATELETCT 119*   MPV 10.6     Recent Labs     08/11/21  1713   SODIUM 135   POTASSIUM 3.2*   CHLORIDE 96   CO2 23   GLUCOSE 123*   BUN 13   CREATININE 0.58   CALCIUM 8.9     Recent Labs     08/11/21  1713   ALTSGPT 23   ASTSGOT 33   ALKPHOSPHAT 81   TBILIRUBIN 0.4   GLUCOSE 123*         No results for input(s): NTPROBNP in the last 72 hours.      No results for input(s): TROPONINT in the last 72 hours.    Imaging:  DX-CHEST-PORTABLE (1 VIEW)   Final Result      Mild bibasilar opacities which could represent atelectasis or mild pneumonitis.          X-Ray:  I have personally reviewed the images and compared with prior images.    Assessment/Plan:  I anticipate this patient will require at least two midnights for appropriate medical management, necessitating inpatient admission.    Hypokalemia  Assessment & Plan  Potassium repletion, follow-up magnesium and basic metabolic panel    Pneumonia due to COVID-19 virus  Assessment & Plan  Covid care set deployed    Acute respiratory failure (HCC)  Assessment & Plan  Complicated by Covid and obstructive sleep apnea.  Covid care set, Decadron, supplemental oxygen, high flow as needed    Obstructive sleep apnea- (present on admission)  Assessment & Plan  CPAP ordered      VTE prophylaxis: SCDs/TEDs

## 2021-08-12 NOTE — DIETARY
"Nutrition services: Day 1 of admit.  Gina Scott is a 51 y.o. female with admitting DX of COVID-19.  Consult received for malnutrition screening tool score of 2 (2-13 lb < 1 week, poor PO intake).     Unable to visit patient room secondary to COVID isolation precautions. Called patient over phone. Patient reports usual body weight of 185 lb - recalls last being in this weight range less than 2 weeks ago. 4.3% weight loss x 2 weeks is clinically significant, severe. Patient reports since last Saturday she has not had a solid meal - only intake has been some sips of juice throughout day due to nausea - meeting <50% estimated energy needs x 5-6 days which is clinically significant, severe.     Obtained patient's menu order for nutrition representative over the phone - patient willing to try some broth along with juice, toast, crackers, and a yogurt smoothie. Will continue yogurt smoothies with all meals to bolster PO intake.      Assessment:  Height: 157.5 cm (5' 2\")  Weight: 80.4 kg (177 lb 4 oz) via bed scale 8/12.   Body mass index is 32.42 kg/m²., BMI classification: Obese Class I  Diet/Intake: Regular. No PO documentation available.     Evaluation:   1. PMH: sleep apnea, asthma, hypothyroidism  2. Currently on 2 L NC per documentation.   3. Weight history per chart review:   · 82.1 kg (181 lb) 3/16/21  4. MAR: decadron, synthroid, Kdur  5. Labs: Na 131 (L), glu 151 (H- suspect steroid contributing to elevation, no A1c or history of DM)  6. Last bowel movement 8/12.     Malnutrition Risk: Patient with severe acute malnutrition RT COVID-19 AEB PO <50% estimated needs x 5-6 days (severe) and 4.3% weight loss in less than two weeks (severe).     Recommendations/Plan:  1. Added tolerable foods - soup, toast, yogurt smoothies, juice - to dinner order to optimize patient's PO intake.  2. Add chobani smoothie with all meals to bolster nutritional intake.  3. Encourage intake of meals.  4. Document intake of " all meals as % taken in ADL's to provide interdisciplinary communication across all shifts.   5. Monitor weight.  6. Nutrition rep will continue to see patient for ongoing meal and snack preferences.     RD following.

## 2021-08-12 NOTE — FACE TO FACE
"Face to Face Note  -  Durable Medical Equipment    Rivera Guzman M.D. - NPI: [id number]  I certify that this patient is under my care and that they had a durable medical equipment(DME)face to face encounter by the clinical nurse specialist working collaboratively with me that meets the physician DME face-to-face encounter requirements with this patient on:    Date of encounter:   Patient:                    MRN:                       YOB: 2019  Gina Scott  [id number]  1970     The encounter with the patient was in whole, or in part, for the following medical condition, which is the primary reason for durable medical equipment:  Other - covid -19 infection     I certify that, based on my findings, the following durable medical equipment is medically necessary:  Oxygen.    HOME O2 Saturation Measurements:(Values must be present for Home Oxygen orders)  Room air sat at rest: 87  Room air sat with amb: 91  With liters of O2: 2, O2 sat at rest with O2: 94  With Liters of O2: 2, O2 sat with amb with O2 : 95  Is the patient mobile?: Yes    My Clinical findings support the need for the above equipment due to:  Hypoxia    Supporting Symptoms: The patient requires supplemental oxygen, as the following interventions have been tried with limited or no improvement: \"Bronchodilators and/or steroid inhalers    If patient feels more short of breath, they can go up to 6 liters per minute and contact healthcare provider.  "

## 2021-08-12 NOTE — PROGRESS NOTES
4 Eyes Skin Assessment Completed by NORM Sifuentes and NORM Estrella.    Head WDL  Ears WDL  Nose WDL  Mouth WDL  Neck WDL  Breast/Chest WDL  Shoulder Blades WDL  Spine WDL  (R) Arm/Elbow/Hand WDL  (L) Arm/Elbow/Hand WDL  Abdomen WDL  Groin WDL  Scrotum/Coccyx/Buttocks old incision   (R) Leg WDL  (L) Leg WDL  (R) Heel/Foot/Toe WDL  (L) Heel/Foot/Toe WDL          Devices In Places Nasal Cannula      Interventions In Place NC W/Ear Foams    Possible Skin Injury No    Pictures Uploaded Into Epic N/A  Wound Consult Placed N/A  RN Wound Prevention Protocol Ordered No

## 2021-08-12 NOTE — ASSESSMENT & PLAN NOTE
Likely in setting of Covid infection.  We will get DVT study.  If DVT negative we will get CT angio chest to r/o PE

## 2021-08-12 NOTE — PROGRESS NOTES
Pt arrived on unit via gurney accompanied by pt transport team. Patient belongings at bedside, offered safekeeping, patient refused. Pt oriented to unit, call light system, and call light usage. Chart reviewed, labs reviewed, will continue to monitor. Pt is A/O x4 . Remains on 2L of oxygen via NC. Currently on COVID isolation. 2 RN skin check performed. Discussed plan of care.  Assessed patient's immediate needs.  Pt denies any needs at this time.  Safety precautions in place and hourly rounding in place.  Patient resting calmly in bed.

## 2021-08-12 NOTE — DISCHARGE PLANNING
Received Choice form at 1304  Agency/Facility Name: [facility]  Referral sent per Choice form @ 3650

## 2021-08-12 NOTE — CARE PLAN
The patient is Watcher - Medium risk of patient condition declining or worsening    Shift Goals  Clinical Goals: keep SpO2 >90%    Progress made toward(s) clinical / shift goals:  Pt remains on 2L of oxygen via NC ranging between 94-95% NC. No complaints of SOB. Remains on isolation for COVID-19.     Patient is not progressing towards the following goals:

## 2021-08-12 NOTE — ASSESSMENT & PLAN NOTE
COVID Isolation  currently on nasal cannula 2l    CXR bilateral interstitial infiltrates-    Continue supportive care with oxygen supplementation, proning, judicious use of IV fluids,  incentive spirometry  dexamethasone 6 mg PO daily   currently does not meet criteria for remdesivi  If patient develops respiratory distress recommend HFNC if possible.  If patient becomes hypotensive avoid excessive fluid resuscitation- suggest starting pressors early.  Vital signs as per unit protocol  Monitor inflammatory markers every 48 hours as indicated  Labs in the morning - CBC, BMP,LFTS  DVT Prophylaxis: Lovenox 40 mg once daily(Prophylactic anticoagulation not recommended unless high risk )  Home O2 evaluation

## 2021-08-13 ENCOUNTER — TELEPHONE (OUTPATIENT)
Dept: OTHER | Facility: MEDICAL CENTER | Age: 51
End: 2021-08-13

## 2021-08-13 VITALS
HEART RATE: 85 BPM | RESPIRATION RATE: 16 BRPM | SYSTOLIC BLOOD PRESSURE: 91 MMHG | DIASTOLIC BLOOD PRESSURE: 61 MMHG | WEIGHT: 177.25 LBS | HEIGHT: 62 IN | BODY MASS INDEX: 32.62 KG/M2 | OXYGEN SATURATION: 93 % | TEMPERATURE: 99.1 F

## 2021-08-13 PROCEDURE — 99239 HOSP IP/OBS DSCHRG MGMT >30: CPT | Performed by: STUDENT IN AN ORGANIZED HEALTH CARE EDUCATION/TRAINING PROGRAM

## 2021-08-13 PROCEDURE — 99454 REM MNTR PHYSIOL PARAM 16-30: CPT

## 2021-08-13 PROCEDURE — 99453 REM MNTR PHYSIOL PARAM SETUP: CPT

## 2021-08-13 PROCEDURE — A9270 NON-COVERED ITEM OR SERVICE: HCPCS | Performed by: INTERNAL MEDICINE

## 2021-08-13 PROCEDURE — 94640 AIRWAY INHALATION TREATMENT: CPT

## 2021-08-13 PROCEDURE — 700111 HCHG RX REV CODE 636 W/ 250 OVERRIDE (IP): Performed by: INTERNAL MEDICINE

## 2021-08-13 PROCEDURE — 700102 HCHG RX REV CODE 250 W/ 637 OVERRIDE(OP): Performed by: INTERNAL MEDICINE

## 2021-08-13 RX ORDER — GUAIFENESIN/DEXTROMETHORPHAN 100-10MG/5
5 SYRUP ORAL EVERY 6 HOURS PRN
Qty: 118 ML | Refills: 0 | Status: ON HOLD | OUTPATIENT
Start: 2021-08-13 | End: 2021-08-20 | Stop reason: SDUPTHER

## 2021-08-13 RX ORDER — DEXAMETHASONE 6 MG/1
6 TABLET ORAL DAILY
Qty: 10 TABLET | Refills: 0 | Status: SHIPPED
Start: 2021-08-14 | End: 2021-08-17

## 2021-08-13 RX ADMIN — ENOXAPARIN SODIUM 40 MG: 40 INJECTION SUBCUTANEOUS at 04:28

## 2021-08-13 RX ADMIN — DEXAMETHASONE 6 MG: 4 TABLET ORAL at 04:28

## 2021-08-13 RX ADMIN — ALBUTEROL SULFATE 2 PUFF: 90 AEROSOL, METERED RESPIRATORY (INHALATION) at 07:59

## 2021-08-13 RX ADMIN — LEVOTHYROXINE SODIUM 112 MCG: 0.11 TABLET ORAL at 04:28

## 2021-08-13 RX ADMIN — ALBUTEROL SULFATE 2 PUFF: 90 AEROSOL, METERED RESPIRATORY (INHALATION) at 02:14

## 2021-08-13 ASSESSMENT — PAIN DESCRIPTION - PAIN TYPE: TYPE: ACUTE PAIN

## 2021-08-13 NOTE — TELEPHONE ENCOUNTER
[facility]  Gina Scott  MRN:[id number]  [phone]  Pt went home today on 2L. [place] resident and English speaking.

## 2021-08-13 NOTE — RESPIRATORY CARE
REMOTE MONITORING PROGRAM by RESPIRATORY THERAPY  8/13/2021 at 12:13 PM by Quang Good     Patient interviewed by RT. Patient agrees to Remote Monitoring program. Device instruction performed with welcome packet, consent signed and placed at bedside chart. Patient instructed on how to use MyChart and Zoom. No questions at this time.     Flyer and education performed on remote monitoring with bedside RN complete.

## 2021-08-13 NOTE — DISCHARGE PLANNING
Agency/Facility Name: [facility]  Spoke To: [facility]  Outcome: Pt accepted on service. May get bunker tank for DC. Pt needs to call [facility] when home for set up.    RN TALYA informed

## 2021-08-13 NOTE — DISCHARGE PLANNING
Received Transport Form @ 1030  Spoke to Kristi @ [facility]    Transport is scheduled for 8/13/2021 @1145 going to home.      Notified care team via Voalte of scheduled discharge.

## 2021-08-13 NOTE — DISCHARGE INSTRUCTIONS
Discharge Instructions    Discharged to home by medical transportation with escort. Discharged via wheelchair, hospital escort: Yes.  Special equipment needed: Oxygen    Be sure to schedule a follow-up appointment with your primary care doctor or any specialists as instructed.     Discharge Plan:   Diet Plan: Discussed  Activity Level: Discussed  Confirmed Follow up Appointment: Patient to Call and Schedule Appointment  Confirmed Symptoms Management: Discussed  Medication Reconciliation Updated: Yes    I understand that a diet low in cholesterol, fat, and sodium is recommended for good health. Unless I have been given specific instructions below for another diet, I accept this instruction as my diet prescription.       Special Instructions:     COVID-19  COVID-19 is a respiratory infection that is caused by a virus called severe acute respiratory syndrome coronavirus 2 (SARS-CoV-2). The disease is also known as coronavirus disease or novel coronavirus. In some people, the virus may not cause any symptoms. In others, it may cause a serious infection. The infection can get worse quickly and can lead to complications, such as:  · Pneumonia, or infection of the lungs.  · Acute respiratory distress syndrome or ARDS. This is fluid build-up in the lungs.  · Acute respiratory failure. This is a condition in which there is not enough oxygen passing from the lungs to the body.  · Sepsis or septic shock. This is a serious bodily reaction to an infection.  · Blood clotting problems.  · Secondary infections due to bacteria or fungus.  The virus that causes COVID-19 is contagious. This means that it can spread from person to person through droplets from coughs and sneezes (respiratory secretions).  What are the causes?  This illness is caused by a virus. You may catch the virus by:  · Breathing in droplets from an infected person's cough or sneeze.  · Touching something, like a table or a doorknob, that was exposed to the virus  (contaminated) and then touching your mouth, nose, or eyes.  What increases the risk?  Risk for infection  You are more likely to be infected with this virus if you:  · Live in or travel to an area with a COVID-19 outbreak.  · Come in contact with a sick person who recently traveled to an area with a COVID-19 outbreak.  · Provide care for or live with a person who is infected with COVID-19.  Risk for serious illness  You are more likely to become seriously ill from the virus if you:  · Are 65 years of age or older.  · Have a long-term disease that lowers your body's ability to fight infection (immunocompromised).  · Live in a nursing home or long-term care facility.  · Have a long-term (chronic) disease such as:  ? Chronic lung disease, including chronic obstructive pulmonary disease or asthma  ? Heart disease.  ? Diabetes.  ? Chronic kidney disease.  ? Liver disease.  · Are obese.  What are the signs or symptoms?  Symptoms of this condition can range from mild to severe. Symptoms may appear any time from 2 to 14 days after being exposed to the virus. They include:  · A fever.  · A cough.  · Difficulty breathing.  · Chills.  · Muscle pains.  · A sore throat.  · Loss of taste or smell.  Some people may also have stomach problems, such as nausea, vomiting, or diarrhea.  Other people may not have any symptoms of COVID-19.  How is this diagnosed?  This condition may be diagnosed based on:  · Your signs and symptoms, especially if:  ? You live in an area with a COVID-19 outbreak.  ? You recently traveled to or from an area where the virus is common.  ? You provide care for or live with a person who was diagnosed with COVID-19.  · A physical exam.  · Lab tests, which may include:  ? A nasal swab to take a sample of fluid from your nose.  ? A throat swab to take a sample of fluid from your throat.  ? A sample of mucus from your lungs (sputum).  ? Blood tests.  · Imaging tests, which may include, X-rays, CT scan, or  ultrasound.  How is this treated?  At present, there is no medicine to treat COVID-19. Medicines that treat other diseases are being used on a trial basis to see if they are effective against COVID-19.  Your health care provider will talk with you about ways to treat your symptoms. For most people, the infection is mild and can be managed at home with rest, fluids, and over-the-counter medicines.  Treatment for a serious infection usually takes places in a hospital intensive care unit (ICU). It may include one or more of the following treatments. These treatments are given until your symptoms improve.  · Receiving fluids and medicines through an IV.  · Supplemental oxygen. Extra oxygen is given through a tube in the nose, a face mask, or a yanes.  · Positioning you to lie on your stomach (prone position). This makes it easier for oxygen to get into the lungs.  · Continuous positive airway pressure (CPAP) or bi-level positive airway pressure (BPAP) machine. This treatment uses mild air pressure to keep the airways open. A tube that is connected to a motor delivers oxygen to the body.  · Ventilator. This treatment moves air into and out of the lungs by using a tube that is placed in your windpipe.  · Tracheostomy. This is a procedure to create a hole in the neck so that a breathing tube can be inserted.  · Extracorporeal membrane oxygenation (ECMO). This procedure gives the lungs a chance to recover by taking over the functions of the heart and lungs. It supplies oxygen to the body and removes carbon dioxide.  Follow these instructions at home:  Lifestyle  · If you are sick, stay home except to get medical care. Your health care provider will tell you how long to stay home. Call your health care provider before you go for medical care.  · Rest at home as told by your health care provider.  · Do not use any products that contain nicotine or tobacco, such as cigarettes, e-cigarettes, and chewing tobacco. If you need help  quitting, ask your health care provider.  · Return to your normal activities as told by your health care provider. Ask your health care provider what activities are safe for you.  General instructions  · Take over-the-counter and prescription medicines only as told by your health care provider.  · Drink enough fluid to keep your urine pale yellow.  · Keep all follow-up visits as told by your health care provider. This is important.  How is this prevented?    There is no vaccine to help prevent COVID-19 infection. However, there are steps you can take to protect yourself and others from this virus.  To protect yourself:   · Do not travel to areas where COVID-19 is a risk. The areas where COVID-19 is reported change often. To identify high-risk areas and travel restrictions, check the CDC travel website: wwwnc.cdc.gov/travel/notices  · If you live in, or must travel to, an area where COVID-19 is a risk, take precautions to avoid infection.  ? Stay away from people who are sick.  ? Wash your hands often with soap and water for 20 seconds. If soap and water are not available, use an alcohol-based hand .  ? Avoid touching your mouth, face, eyes, or nose.  ? Avoid going out in public, follow guidance from your state and local health authorities.  ? If you must go out in public, wear a cloth face covering or face mask.  ? Disinfect objects and surfaces that are frequently touched every day. This may include:  § Counters and tables.  § Doorknobs and light switches.  § Sinks and faucets.  § Electronics, such as phones, remote controls, keyboards, computers, and tablets.  To protect others:  If you have symptoms of COVID-19, take steps to prevent the virus from spreading to others.  · If you think you have a COVID-19 infection, contact your health care provider right away. Tell your health care team that you think you may have a COVID-19 infection.  · Stay home. Leave your house only to seek medical care. Do not use  public transport.  · Do not travel while you are sick.  · Wash your hands often with soap and water for 20 seconds. If soap and water are not available, use alcohol-based hand .  · Stay away from other members of your household. Let healthy household members care for children and pets, if possible. If you have to care for children or pets, wash your hands often and wear a mask. If possible, stay in your own room, separate from others. Use a different bathroom.  · Make sure that all people in your household wash their hands well and often.  · Cough or sneeze into a tissue or your sleeve or elbow. Do not cough or sneeze into your hand or into the air.  · Wear a cloth face covering or face mask.  Where to find more information  · Centers for Disease Control and Prevention: www.cdc.gov/coronavirus/2019-ncov/index.html  · World Health Organization: www.who.int/health-topics/coronavirus  Contact a health care provider if:  · You live in or have traveled to an area where COVID-19 is a risk and you have symptoms of the infection.  · You have had contact with someone who has COVID-19 and you have symptoms of the infection.  Get help right away if:  · You have trouble breathing.  · You have pain or pressure in your chest.  · You have confusion.  · You have bluish lips and fingernails.  · You have difficulty waking from sleep.  · You have symptoms that get worse.  These symptoms may represent a serious problem that is an emergency. Do not wait to see if the symptoms will go away. Get medical help right away. Call your local emergency services (911 in the [place] Do not drive yourself to the hospital. Let the emergency medical personnel know if you think you have COVID-19.  Summary  · COVID-19 is a respiratory infection that is caused by a virus. It is also known as coronavirus disease or novel coronavirus. It can cause serious infections, such as pneumonia, acute respiratory distress syndrome, acute respiratory  failure, or sepsis.  · The virus that causes COVID-19 is contagious. This means that it can spread from person to person through droplets from coughs and sneezes.  · You are more likely to develop a serious illness if you are 65 years of age or older, have a weak immunity, live in a nursing home, or have chronic disease.  · There is no medicine to treat COVID-19. Your health care provider will talk with you about ways to treat your symptoms.  · Take steps to protect yourself and others from infection. Wash your hands often and disinfect objects and surfaces that are frequently touched every day. Stay away from people who are sick and wear a mask if you are sick.  This information is not intended to replace advice given to you by your health care provider. Make sure you discuss any questions you have with your health care provider.  Document Released: 01/23/2020 Document Revised: 05/14/2020 Document Reviewed: 01/23/2020  [employer] Patient Education © 2020 [employer]    · Is patient discharged on Warfarin / Coumadin?   No     Depression / Suicide Risk    As you are discharged from this [facility] facility, it is important to learn how to keep safe from harming yourself.    Recognize the warning signs:  · Abrupt changes in personality, positive or negative- including increase in energy   · Giving away possessions  · Change in eating patterns- significant weight changes-  positive or negative  · Change in sleeping patterns- unable to sleep or sleeping all the time   · Unwillingness or inability to communicate  · Depression  · Unusual sadness, discouragement and loneliness  · Talk of wanting to die  · Neglect of personal appearance   · Rebelliousness- reckless behavior  · Withdrawal from people/activities they love  · Confusion- inability to concentrate     If you or a loved one observes any of these behaviors or has concerns about self-harm, here's what you can do:  · Talk about it- your feelings and reasons for  harming yourself  · Remove any means that you might use to hurt yourself (examples: pills, rope, extension cords, firearm)  · Get professional help from the community (Mental Health, Substance Abuse, psychological counseling)  · Do not be alone:Call your Safe Contact- someone whom you trust who will be there for you.  · Call your local CRISIS HOTLINE 690-6024 or 494-706-5963  · Call your local Children's Mobile Crisis Response Team [place] (374) 246-2102 or wwwSubtextual  · Call the toll free National Suicide Prevention Hotlines   · National Suicide Prevention Lifeline 293-769-UUPP (9036)  · National Hope Line Network 800-SUICIDE (656-2598)  INSTRUCTIONS FOR COVID-19 OR ANY OTHER INFECTIOUS RESPIRATORY ILLNESSES    The Centers for Disease Control and Prevention (CDC) states that early indications for COVID-19 include cough, shortness of breath, difficulty breathing, or at least two of the following symptoms: chills, shaking with chills, muscle pain, headache, sore throat, and loss of taste or smell. Symptoms can range from mild to severe and may appear up to two weeks after exposure to the virus.    The practice of self-isolation and quarantine helps protect the public and your family by  preventing exposure to people who have or may have a contagious disease. Please follow the prevention steps below as based on CDC guidelines:    WHEN TO STOP ISOLATION: Persons with COVID-19 or any other infectious respiratory illness who have symptoms and were advised to care for themselves at home may discontinue home isolation under the following conditions:  · At least 24 hours have passed since recovery defined as resolution of fever without the use of fever-reducing medications; AND,  · Improvement in respiratory symptoms (e.g., cough, shortness of breath); AND,  · At least 10 days have passed since symptoms first appeared and have had no subsequent illness.    MONITOR YOUR SYMPTOMS: If your illness is worsening,  seek prompt medical attention. If you have a medical emergency and need to call 911, notify the dispatch personnel that you have, or are being evaluated for confirmed or suspected COVID-19 or another infectious respiratory illness. Wear a facemask if possible.    ACTIVITY RESTRICTION: restrict activities outside your home, except for getting medical care. Do not go to work, school, or public areas. Avoid using public transportation, ride-sharing, or taxis.    SCHEDULED MEDICAL APPOINTMENTS: Notify your provider that you have, or are being evaluated for, confirmed or suspected COVID-19 or another infectious respiratory. This will help the healthcare provider’s office safely take care of you and keep other people from getting exposed or infected.    FACEMASKS, when to wear: Anytime you are away from your home or around other people or pets. If you are unable to wear one, maintain a minimum of 6 feet distancing from others.    LIVING ENVIRONMENT: Stay in a separate room from other people and pets. If possible, use a separate bathroom, have someone else care for your pets and avoid sharing household items. Any items used should be washed thoroughly with soap and water. Clean all “high-touch” surfaces every day. Use a household cleaning spray or wipe, according to the label instructions. High touch surfaces include (but are not limited to) counters, tabletops, doorknobs, bathroom fixtures, toilets, phones, keyboards, tablets, and bedside tables.     HAND WASHING: Frequently wash hands with soap and water for at least 20 seconds,  especially after blowing your nose, coughing, or sneezing; going to the bathroom; before and after interacting with pets; and before and after eating or preparing food. If hands are visibly dirty use soap and water. If soap and water are not available, use an alcohol-based hand  with at least 60% alcohol. Avoid touching your eyes, nose, and mouth with unwashed hands. Cover your coughs  and sneezes with a tissue. Throw used tissues in a lined trash can. Immediately wash your hands.    ACTIVE/FACILITATED SELF-MONITORING: Follow instructions provided by your local health department or health professionals, as appropriate. When working with your local health department check their available hours.    [facility]   Phone Number   [place] [phone]   [place], [place] [phone]   [place] [address]   [place] [phone]     IF YOU HAVE CONFIRMED POSITIVE COVID-19:    Those who have completely recovered from COVID-19 may have immune-boosting antibodies in their plasma--called “convalescent plasma”--that could be used to treat critically ill COVID19 patients.    [facility] is excited to begin working with [employer] on collecting convalescent plasma from  people who have recovered from COVID-19 as part of a program to treat patients infected with the virus. This FDA-approved “emergency investigational new drug” is a special blood product containing antibodies that may give patients an extra boost to fight the virus.    To be eligible to donate convalescent plasma, you must have a prior COVID-19 diagnosis documented by a laboratory test (or a positive test result for SARS-CoV-2 antibodies) and meet additional eligibility requirements.    If you are interested in donating convalescent plasma or have any additional questions, please contact the [facility] Convalescent Plasma  at [phone] or via e-mail at [email or web address].

## 2021-08-13 NOTE — DISCHARGE SUMMARY
Discharge Summary    CHIEF COMPLAINT ON ADMISSION  Chief Complaint   Patient presents with   • Shortness of Breath     BIBA from home for increasing SOB after being diagnosed with COVID 10 days ago. RA sat was 90% per EMS, placed on 4L NC and feeling better. Cough noted and rales in RLL.        Reason for Admission  ems     Admission Date  8/11/2021    CODE STATUS  Full Code    HPI & HOSPITAL COURSE  51 female with past medical history of asthma, diabetes, obstructive sleep apnea, recently diagnosed with COVID-19 infection on 10/4/2021 presented with worsening shortness of breath. Chest x-ray noted with mild bibasilar opacity. On arrival to ED patient noted to be tachypneic desaturating 87% on room air. Oxygen saturation improved over 90 on 2 L of cannula. Admitted for acute hypoxic respiratory failure in setting of COVID-19 infection.  Labs were remarkable for elevated CRP, elevated dimer.  CT angio chest negative for pulmonary emboli.  DVT study negative.  Patient continued to require 2 L oxygen on ambulation.  Home oxygen was provided.  At the time of discharge patient remain hemodynamically stable, labs remain unremarkable, asymptomatic.  Patient be discharged home with close follow-up with PCP.  She will be sent home on remote monitoring.  Advised to return back to ED if her symptoms worsen.    Therefore, she is discharged in good and stable condition to home with close outpatient follow-up.    The patient met 2-midnight criteria for an inpatient stay at the time of discharge.    Discharge Date  8/13/2021          DISCHARGE DIAGNOSES  Principal Problem:    Pneumonia due to COVID-19 virus POA: Unknown  Active Problems:    Obstructive sleep apnea POA: Yes    Acute respiratory failure with hypoxia (HCC) POA: Unknown    Hypokalemia POA: Unknown    Severe protein-calorie malnutrition (HCC) POA: Unknown    Positive D dimer POA: Unknown    Hyponatremia POA: Unknown  Resolved Problems:    * No resolved hospital  problems. *      FOLLOW UP  No future appointments.  No follow-up provider specified.    MEDICATIONS ON DISCHARGE     Medication List      START taking these medications      Instructions   dexamethasone 6 MG Tabs  Start taking on: August 14, 2021  Commonly known as: DECADRON   Take 1 Tablet by mouth every day.  Dose: 6 mg     guaiFENesin dextromethorphan 100-10 MG/5ML Syrp syrup  Commonly known as: ROBITUSSIN DM   Take 5 mL by mouth every 6 hours as needed for Cough for up to 7 days.  Dose: 5 mL        CONTINUE taking these medications      Instructions   albuterol 108 (90 Base) MCG/ACT Aers inhalation aerosol   Inhale 2 Puffs by mouth every 6 hours as needed for Shortness of Breath.  Dose: 2 Puff     levothyroxine 112 MCG Tabs  Commonly known as: SYNTHROID   Take 112 mcg by mouth.  Dose: 112 mcg        STOP taking these medications    azithromycin 250 MG Tabs  Commonly known as: ZITHROMAX            Allergies  Allergies   Allergen Reactions   • [place] Swelling     Mouth swells up     • Darvocet [Propoxyphene N-Apap] Anaphylaxis   • Hctz [Hydrochlorothiazide]    • Pcn [Penicillins]        DIET  Orders Placed This Encounter   Procedures   • Diet Order Diet: Regular     Standing Status:   Standing     Number of Occurrences:   1     Order Specific Question:   Diet:     Answer:   Regular [1]       ACTIVITY  As tolerated.  Weight bearing as tolerated        LABORATORY  Lab Results   Component Value Date    SODIUM 131 (L) 08/12/2021    POTASSIUM 3.7 08/12/2021    CHLORIDE 94 (L) 08/12/2021    CO2 25 08/12/2021    GLUCOSE 151 (H) 08/12/2021    BUN 14 08/12/2021    CREATININE 0.60 08/12/2021    CREATININE 0.7 01/22/2006        Lab Results   Component Value Date    WBC 2.6 (L) 08/12/2021    HEMOGLOBIN 16.6 (H) 08/12/2021    HEMATOCRIT 49.4 (H) 08/12/2021    PLATELETCT 116 (L) 08/12/2021        Total time of the discharge process exceeds  39  minutes.

## 2021-08-13 NOTE — PROGRESS NOTES
Discharge document and RX was Provided and explained to pt. Verbalized understanding. Iv removed, tip intact. Pt discharged home Via [facility] with all personal belongings

## 2021-08-14 ENCOUNTER — TELEPHONE (OUTPATIENT)
Dept: OTHER | Facility: MEDICAL CENTER | Age: 51
End: 2021-08-14

## 2021-08-14 VITALS — HEART RATE: 73 BPM | OXYGEN SATURATION: 84 % | RESPIRATION RATE: 22 BRPM

## 2021-08-14 VITALS — OXYGEN SATURATION: 88 % | HEART RATE: 69 BPM | RESPIRATION RATE: 19 BRPM

## 2021-08-14 VITALS — HEART RATE: 22 BPM | RESPIRATION RATE: 84 BRPM | OXYGEN SATURATION: 85 %

## 2021-08-14 NOTE — TELEPHONE ENCOUNTER
Pt alerted with low SpO2 og 84%.  Called pt and she is feeling fine.     Pt oxygen is back up to 90%. Will continue to monitor.

## 2021-08-14 NOTE — TELEPHONE ENCOUNTER
Pt alerted again with low SpO2 of 85%. Called pt and she was up taking her meds.     Pt oxygen is back up to 92%. Will continue to monitor.

## 2021-08-15 ENCOUNTER — TELEPHONE (OUTPATIENT)
Dept: OTHER | Facility: MEDICAL CENTER | Age: 51
End: 2021-08-15

## 2021-08-15 VITALS — OXYGEN SATURATION: 85 % | RESPIRATION RATE: 22 BRPM | HEART RATE: 78 BPM

## 2021-08-15 VITALS — OXYGEN SATURATION: 88 % | HEART RATE: 76 BPM | RESPIRATION RATE: 18 BRPM

## 2021-08-15 VITALS — OXYGEN SATURATION: 90 % | HEART RATE: 78 BPM | RESPIRATION RATE: 18 BRPM

## 2021-08-15 VITALS — OXYGEN SATURATION: 86 % | HEART RATE: 102 BPM | RESPIRATION RATE: 26 BRPM

## 2021-08-15 VITALS — HEART RATE: 72 BPM | RESPIRATION RATE: 23 BRPM | OXYGEN SATURATION: 86 %

## 2021-08-15 NOTE — TELEPHONE ENCOUNTER
Called patient about ow oxygen reading of 84%. Patient stated she was doing okay.  Will continue to monitor.

## 2021-08-15 NOTE — TELEPHONE ENCOUNTER
Patient called back stating she went up stairs to get ready. Patients oxygen dropped again. She said after she gets ready she will sit down.  Will continue to monitor.

## 2021-08-15 NOTE — TELEPHONE ENCOUNTER
Alerted at 88%. Called Gina she stated she was just getting up and walking around. While on the phone she started coughing O2 dropped to 84% Gina stated she put her O2 to 3L. Had her take some deep breaths and asked her to us IS. She was able to get her O2 up to 90%

## 2021-08-15 NOTE — TELEPHONE ENCOUNTER
81% O2 alert. Gina stated she is still moving around. She stated she is fine. Will continue to monitor.

## 2021-08-16 ENCOUNTER — TELEPHONE (OUTPATIENT)
Dept: OTHER | Facility: MEDICAL CENTER | Age: 51
End: 2021-08-16

## 2021-08-16 VITALS — RESPIRATION RATE: 24 BRPM | OXYGEN SATURATION: 89 % | HEART RATE: 68 BPM

## 2021-08-16 VITALS — OXYGEN SATURATION: 93 % | RESPIRATION RATE: 17 BRPM | HEART RATE: 55 BPM

## 2021-08-16 VITALS — RESPIRATION RATE: 23 BRPM | OXYGEN SATURATION: 90 % | HEART RATE: 62 BPM

## 2021-08-16 VITALS — RESPIRATION RATE: 18 BRPM | HEART RATE: 88 BPM | OXYGEN SATURATION: 87 %

## 2021-08-16 VITALS — RESPIRATION RATE: 24 BRPM | OXYGEN SATURATION: 86 % | HEART RATE: 89 BPM

## 2021-08-16 VITALS — HEART RATE: 70 BPM | OXYGEN SATURATION: 90 % | RESPIRATION RATE: 20 BRPM

## 2021-08-16 LAB
BACTERIA BLD CULT: NORMAL
BACTERIA BLD CULT: NORMAL
SIGNIFICANT IND 70042: NORMAL
SIGNIFICANT IND 70042: NORMAL
SITE SITE: NORMAL
SITE SITE: NORMAL
SOURCE SOURCE: NORMAL
SOURCE SOURCE: NORMAL

## 2021-08-16 NOTE — TELEPHONE ENCOUNTER
called Gina alerted [phone]%.  Stated she was just waking up. She is on 3L . Unable to get   O2 to come back up. RN approved me to tell her to move O2 up to 4L. Stayed  on phone with Gian O2 kept dropping. RN ok to move up to 5L. Stayed on phone reminded to take deep breaths and use IS. Still couldn't get O2 to come back up. Gina stated she was really tired, she was very hard to hear, delayed to answer questions. Asked if her son was her emergency contact. She stated yes. I asked if he could bring her into ER. She stated that he was very sick as well, and wasn't doing good. Notified Rn, she ok calling [facility] and ask that they also check on son.  [facility] backlined called at [phone]  [facility] that they were on their way. She stated she understood. I let her know I would still be monitoring her from our side. I reminded her to focus on her breathing.

## 2021-08-16 NOTE — TELEPHONE ENCOUNTER
[id number] [facility] called, Gina refused [facility] to come out. Address wrong in Epic   Right address per [facility] and Gina. [address]

## 2021-08-16 NOTE — TELEPHONE ENCOUNTER
Called patient to see how she was doing. Patient alerted down to 82%. Patient stated she got up to the bathroom but felt fine.  Will continue to monitor.

## 2021-08-16 NOTE — TELEPHONE ENCOUNTER
Pt alerted due to low oxygen called pt stated she's up taking her medication currently on 2L of oxygen. Pt has no need at this time

## 2021-08-16 NOTE — TELEPHONE ENCOUNTER
1547 83% Gina moving around. She was able to get her O2 back up. Let her know I will continue to monitor

## 2021-08-17 ENCOUNTER — TELEPHONE (OUTPATIENT)
Dept: OTHER | Facility: MEDICAL CENTER | Age: 51
End: 2021-08-17

## 2021-08-17 ENCOUNTER — HOSPITAL ENCOUNTER (INPATIENT)
Facility: MEDICAL CENTER | Age: 51
LOS: 3 days | DRG: 177 | End: 2021-08-20
Attending: EMERGENCY MEDICINE | Admitting: HOSPITALIST
Payer: MEDICARE

## 2021-08-17 ENCOUNTER — APPOINTMENT (OUTPATIENT)
Dept: RADIOLOGY | Facility: MEDICAL CENTER | Age: 51
DRG: 177 | End: 2021-08-17
Attending: EMERGENCY MEDICINE
Payer: MEDICARE

## 2021-08-17 VITALS — OXYGEN SATURATION: 84 % | RESPIRATION RATE: 12 BRPM | HEART RATE: 92 BPM

## 2021-08-17 VITALS — HEART RATE: 92 BPM | OXYGEN SATURATION: 87 % | RESPIRATION RATE: 18 BRPM

## 2021-08-17 VITALS — OXYGEN SATURATION: 86 % | HEART RATE: 99 BPM | RESPIRATION RATE: 19 BRPM

## 2021-08-17 VITALS — RESPIRATION RATE: 17 BRPM | HEART RATE: 106 BPM | OXYGEN SATURATION: 83 %

## 2021-08-17 VITALS — RESPIRATION RATE: 19 BRPM | HEART RATE: 120 BPM | OXYGEN SATURATION: 80 %

## 2021-08-17 VITALS — RESPIRATION RATE: 23 BRPM | OXYGEN SATURATION: 88 % | HEART RATE: 75 BPM

## 2021-08-17 VITALS — HEART RATE: 91 BPM | OXYGEN SATURATION: 85 % | RESPIRATION RATE: 19 BRPM

## 2021-08-17 VITALS — OXYGEN SATURATION: 81 %

## 2021-08-17 VITALS — OXYGEN SATURATION: 84 %

## 2021-08-17 DIAGNOSIS — R09.02 HYPOXIA: ICD-10-CM

## 2021-08-17 DIAGNOSIS — U07.1 COVID-19: ICD-10-CM

## 2021-08-17 LAB
ALBUMIN SERPL BCP-MCNC: 3.3 G/DL (ref 3.2–4.9)
ALBUMIN/GLOB SERPL: 1 G/DL
ALP SERPL-CCNC: 73 U/L (ref 30–99)
ALT SERPL-CCNC: 38 U/L (ref 2–50)
ANION GAP SERPL CALC-SCNC: 11 MMOL/L (ref 7–16)
APPEARANCE UR: ABNORMAL
AST SERPL-CCNC: 24 U/L (ref 12–45)
BACTERIA #/AREA URNS HPF: ABNORMAL /HPF
BASOPHILS # BLD AUTO: 0 % (ref 0–1.8)
BASOPHILS # BLD: 0 K/UL (ref 0–0.12)
BILIRUB SERPL-MCNC: 1 MG/DL (ref 0.1–1.5)
BILIRUB UR QL STRIP.AUTO: NEGATIVE
BUN SERPL-MCNC: 12 MG/DL (ref 8–22)
CALCIUM SERPL-MCNC: 8.8 MG/DL (ref 8.4–10.2)
CHLORIDE SERPL-SCNC: 97 MMOL/L (ref 96–112)
CO2 SERPL-SCNC: 27 MMOL/L (ref 20–33)
COLOR UR: YELLOW
CREAT SERPL-MCNC: 0.52 MG/DL (ref 0.5–1.4)
CRP SERPL HS-MCNC: 5.52 MG/DL (ref 0–0.75)
D DIMER PPP IA.FEU-MCNC: 1.74 UG/ML (FEU) (ref 0–0.5)
EOSINOPHIL # BLD AUTO: 0 K/UL (ref 0–0.51)
EOSINOPHIL NFR BLD: 0 % (ref 0–6.9)
EPI CELLS #/AREA URNS HPF: ABNORMAL /HPF
ERYTHROCYTE [DISTWIDTH] IN BLOOD BY AUTOMATED COUNT: 38.2 FL (ref 35.9–50)
GLOBULIN SER CALC-MCNC: 3.3 G/DL (ref 1.9–3.5)
GLUCOSE SERPL-MCNC: 128 MG/DL (ref 65–99)
GLUCOSE UR STRIP.AUTO-MCNC: NEGATIVE MG/DL
HCT VFR BLD AUTO: 45.4 % (ref 37–47)
HGB BLD-MCNC: 15.3 G/DL (ref 12–16)
KETONES UR STRIP.AUTO-MCNC: NEGATIVE MG/DL
LACTATE BLD-SCNC: 1.3 MMOL/L (ref 0.5–2)
LEUKOCYTE ESTERASE UR QL STRIP.AUTO: NEGATIVE
LYMPHOCYTES # BLD AUTO: 1.31 K/UL (ref 1–4.8)
LYMPHOCYTES NFR BLD: 17 % (ref 22–41)
MANUAL DIFF BLD: NORMAL
MCH RBC QN AUTO: 30.1 PG (ref 27–33)
MCHC RBC AUTO-ENTMCNC: 33.7 G/DL (ref 33.6–35)
MCV RBC AUTO: 89.2 FL (ref 81.4–97.8)
MICRO URNS: ABNORMAL
MONOCYTES # BLD AUTO: 0.85 K/UL (ref 0–0.85)
MONOCYTES NFR BLD AUTO: 11 % (ref 0–13.4)
MUCOUS THREADS #/AREA URNS HPF: ABNORMAL /HPF
NEUTROPHILS # BLD AUTO: 5.54 K/UL (ref 2–7.15)
NEUTROPHILS NFR BLD: 72 % (ref 44–72)
NITRITE UR QL STRIP.AUTO: POSITIVE
NRBC # BLD AUTO: 0 K/UL
NRBC BLD-RTO: 0 /100 WBC
PH UR STRIP.AUTO: 7.5 [PH] (ref 5–8)
PLATELET # BLD AUTO: 194 K/UL (ref 164–446)
PLATELET BLD QL SMEAR: NORMAL
PMV BLD AUTO: 9.3 FL (ref 9–12.9)
POTASSIUM SERPL-SCNC: 3.7 MMOL/L (ref 3.6–5.5)
PROCALCITONIN SERPL-MCNC: <0.02 NG/ML
PROT SERPL-MCNC: 6.6 G/DL (ref 6–8.2)
PROT UR QL STRIP: NEGATIVE MG/DL
RBC # BLD AUTO: 5.09 M/UL (ref 4.2–5.4)
RBC # URNS HPF: ABNORMAL /HPF
RBC BLD AUTO: NORMAL
RBC UR QL AUTO: NEGATIVE
SODIUM SERPL-SCNC: 135 MMOL/L (ref 135–145)
SP GR UR STRIP.AUTO: 1.02
UNIDENT CRYS URNS QL MICRO: ABNORMAL /HPF
VARIANT LYMPHS BLD QL SMEAR: NORMAL
WBC # BLD AUTO: 7.7 K/UL (ref 4.8–10.8)
WBC #/AREA URNS HPF: ABNORMAL /HPF

## 2021-08-17 PROCEDURE — 85379 FIBRIN DEGRADATION QUANT: CPT

## 2021-08-17 PROCEDURE — 700111 HCHG RX REV CODE 636 W/ 250 OVERRIDE (IP): Performed by: HOSPITALIST

## 2021-08-17 PROCEDURE — 87040 BLOOD CULTURE FOR BACTERIA: CPT

## 2021-08-17 PROCEDURE — 80053 COMPREHEN METABOLIC PANEL: CPT

## 2021-08-17 PROCEDURE — 86140 C-REACTIVE PROTEIN: CPT

## 2021-08-17 PROCEDURE — 99223 1ST HOSP IP/OBS HIGH 75: CPT | Performed by: HOSPITALIST

## 2021-08-17 PROCEDURE — 81001 URINALYSIS AUTO W/SCOPE: CPT

## 2021-08-17 PROCEDURE — 99285 EMERGENCY DEPT VISIT HI MDM: CPT

## 2021-08-17 PROCEDURE — 85027 COMPLETE CBC AUTOMATED: CPT

## 2021-08-17 PROCEDURE — 87086 URINE CULTURE/COLONY COUNT: CPT

## 2021-08-17 PROCEDURE — 71045 X-RAY EXAM CHEST 1 VIEW: CPT

## 2021-08-17 PROCEDURE — 85007 BL SMEAR W/DIFF WBC COUNT: CPT

## 2021-08-17 PROCEDURE — 87077 CULTURE AEROBIC IDENTIFY: CPT

## 2021-08-17 PROCEDURE — 87186 SC STD MICRODIL/AGAR DIL: CPT

## 2021-08-17 PROCEDURE — 700102 HCHG RX REV CODE 250 W/ 637 OVERRIDE(OP): Performed by: HOSPITALIST

## 2021-08-17 PROCEDURE — 84145 PROCALCITONIN (PCT): CPT

## 2021-08-17 PROCEDURE — 770020 HCHG ROOM/CARE - TELE (206)

## 2021-08-17 PROCEDURE — A9270 NON-COVERED ITEM OR SERVICE: HCPCS | Performed by: HOSPITALIST

## 2021-08-17 PROCEDURE — 36415 COLL VENOUS BLD VENIPUNCTURE: CPT

## 2021-08-17 PROCEDURE — 83036 HEMOGLOBIN GLYCOSYLATED A1C: CPT

## 2021-08-17 PROCEDURE — 83605 ASSAY OF LACTIC ACID: CPT

## 2021-08-17 RX ORDER — ALBUTEROL SULFATE 90 UG/1
2 AEROSOL, METERED RESPIRATORY (INHALATION) EVERY 6 HOURS PRN
Status: DISCONTINUED | OUTPATIENT
Start: 2021-08-17 | End: 2021-08-18

## 2021-08-17 RX ORDER — ONDANSETRON 4 MG/1
4 TABLET, ORALLY DISINTEGRATING ORAL EVERY 6 HOURS PRN
Status: DISCONTINUED | OUTPATIENT
Start: 2021-08-17 | End: 2021-08-20 | Stop reason: HOSPADM

## 2021-08-17 RX ORDER — ONDANSETRON 2 MG/ML
4 INJECTION INTRAMUSCULAR; INTRAVENOUS EVERY 6 HOURS PRN
Status: DISCONTINUED | OUTPATIENT
Start: 2021-08-17 | End: 2021-08-20 | Stop reason: HOSPADM

## 2021-08-17 RX ORDER — ACETAMINOPHEN 500 MG
1000 TABLET ORAL EVERY 6 HOURS PRN
COMMUNITY
End: 2022-02-12

## 2021-08-17 RX ORDER — DEXAMETHASONE 4 MG/1
6 TABLET ORAL DAILY
Status: DISCONTINUED | OUTPATIENT
Start: 2021-08-17 | End: 2021-08-18

## 2021-08-17 RX ORDER — GUAIFENESIN 600 MG/1
600 TABLET, EXTENDED RELEASE ORAL 2 TIMES DAILY PRN
Status: DISCONTINUED | OUTPATIENT
Start: 2021-08-17 | End: 2021-08-20 | Stop reason: HOSPADM

## 2021-08-17 RX ORDER — CHOLECALCIFEROL (VITAMIN D3) 125 MCG
5 CAPSULE ORAL NIGHTLY
Status: DISCONTINUED | OUTPATIENT
Start: 2021-08-17 | End: 2021-08-20 | Stop reason: HOSPADM

## 2021-08-17 RX ORDER — ACETAMINOPHEN 325 MG/1
650 TABLET ORAL EVERY 6 HOURS PRN
Status: DISCONTINUED | OUTPATIENT
Start: 2021-08-17 | End: 2021-08-20 | Stop reason: HOSPADM

## 2021-08-17 RX ORDER — DEXAMETHASONE 6 MG/1
6 TABLET ORAL EVERY EVENING
COMMUNITY
Start: 2021-08-14 | End: 2022-02-12

## 2021-08-17 RX ORDER — GUAIFENESIN/DEXTROMETHORPHAN 100-10MG/5
5 SYRUP ORAL EVERY 6 HOURS PRN
Status: DISCONTINUED | OUTPATIENT
Start: 2021-08-17 | End: 2021-08-17

## 2021-08-17 RX ORDER — LEVOTHYROXINE SODIUM 112 UG/1
112 TABLET ORAL
Status: DISCONTINUED | OUTPATIENT
Start: 2021-08-18 | End: 2021-08-20 | Stop reason: HOSPADM

## 2021-08-17 RX ADMIN — ENOXAPARIN SODIUM 40 MG: 40 INJECTION SUBCUTANEOUS at 18:36

## 2021-08-17 RX ADMIN — GUAIFENESIN 600 MG: 600 TABLET, EXTENDED RELEASE ORAL at 18:37

## 2021-08-17 RX ADMIN — DEXAMETHASONE 6 MG: 4 TABLET ORAL at 15:04

## 2021-08-17 ASSESSMENT — LIFESTYLE VARIABLES
EVER HAD A DRINK FIRST THING IN THE MORNING TO STEADY YOUR NERVES TO GET RID OF A HANGOVER: NO
ON A TYPICAL DAY WHEN YOU DRINK ALCOHOL HOW MANY DRINKS DO YOU HAVE: 0
TOTAL SCORE: 0
TOTAL SCORE: 0
HOW MANY TIMES IN THE PAST YEAR HAVE YOU HAD 5 OR MORE DRINKS IN A DAY: 0
HAVE YOU EVER FELT YOU SHOULD CUT DOWN ON YOUR DRINKING: NO
ALCOHOL_USE: NO
AVERAGE NUMBER OF DAYS PER WEEK YOU HAVE A DRINK CONTAINING ALCOHOL: 0
CONSUMPTION TOTAL: NEGATIVE
TOTAL SCORE: 0
HAVE PEOPLE ANNOYED YOU BY CRITICIZING YOUR DRINKING: NO
EVER FELT BAD OR GUILTY ABOUT YOUR DRINKING: NO

## 2021-08-17 ASSESSMENT — ENCOUNTER SYMPTOMS
SPUTUM PRODUCTION: 0
BACK PAIN: 0
LOSS OF CONSCIOUSNESS: 0
FALLS: 0
SORE THROAT: 0
FEVER: 0
NAUSEA: 0
PALPITATIONS: 0
CHILLS: 0
DIZZINESS: 0
HEADACHES: 0
SHORTNESS OF BREATH: 0
HEARTBURN: 0
COUGH: 0
DOUBLE VISION: 0
ABDOMINAL PAIN: 0
BLURRED VISION: 0

## 2021-08-17 ASSESSMENT — PATIENT HEALTH QUESTIONNAIRE - PHQ9
SUM OF ALL RESPONSES TO PHQ9 QUESTIONS 1 AND 2: 0
1. LITTLE INTEREST OR PLEASURE IN DOING THINGS: NOT AT ALL
2. FEELING DOWN, DEPRESSED, IRRITABLE, OR HOPELESS: NOT AT ALL

## 2021-08-17 ASSESSMENT — FIBROSIS 4 INDEX: FIB4 SCORE: 2.91

## 2021-08-17 NOTE — H&P
Hospital Medicine History & Physical Note    Date of Service  8/17/2021    Primary Care Physician  Pcp Pt States None    Consultants  none    Specialist Names: NA    Code Status  Full Code    Chief Complaint  Chief Complaint   Patient presents with   • Sent by MD       History of Presenting Illness  Gina Scott is a 51 y.o. female with past medical history of asthma, hypothyroidism, CHRISTOPHER, recently discharged for acute hypoxic respiratory failure secondary to COVID-19 infection on 8/13 who presented 8/17/2021 with worsening hypoxia.  Patient has been discharged with remote patient monitoring and earlier this morning was noted to be hypoxic to high 80s requiring increasing oxygen requirement.  Patient has been discharged on 4 L and is currently requiring 8 L.  She has been compliant with her Decadron.  She states she has been feeling unchanged since her discharge on 8/13 and denies any worsening shortness of breath or cough.  She simply came to ER as it was recommended to her by remote patient monitoring.    I discussed the plan of care with patient and ERP.    Review of Systems  Review of Systems   Constitutional: Negative for chills and fever.   HENT: Negative for congestion, hearing loss and sore throat.    Eyes: Negative for blurred vision and double vision.   Respiratory: Negative for cough, sputum production and shortness of breath.    Cardiovascular: Negative for chest pain and palpitations.   Gastrointestinal: Negative for abdominal pain, heartburn and nausea.   Genitourinary: Negative for dysuria and urgency.   Musculoskeletal: Negative for back pain and falls.   Skin: Negative for itching and rash.   Neurological: Negative for dizziness, loss of consciousness and headaches.   All other systems reviewed and are negative.      Past Medical History   has a past medical history of Asthma, Bronchitis, Chickenpox, Chronic back pain (11/24/2009), ENDOMETRIOSIS (11/24/2009), Grave's disease  (11/24/2009), Hypothyroid (11/24/2009), Hypothyroid, Hypothyroidism, Influenza, Kidney stones, Mild intermittent asthma without complication (1/13/2017), Osteoporosis, PND (post-nasal drip), Seizure disorder (HCC), Sleep apnea, Spinal cord stimulator status, Tonsillitis, Venereal disease, and Vitamin d deficiency (11/24/2009).    Surgical History   has a past surgical history that includes other orthopedic surgery; abdominal hysterectomy total; appendectomy; laminotomy; cholecystectomy; and laparotomy.     Family History  family history includes Cancer in her father and maternal aunt; Diabetes in her father and mother; Hypertension in her father and mother; No Known Problems in her brother, sister, son, son, and son.   Family history reviewed with patient. There is no family history that is pertinent to the chief complaint.     Social History   reports that she quit smoking about 7 years ago. Her smoking use included cigarettes. She has a 3.30 pack-year smoking history. She has never used smokeless tobacco. She reports that she does not drink alcohol and does not use drugs.    Allergies  Allergies   Allergen Reactions   • Darvocet [Propoxyphene N-Apap] Anaphylaxis   • [place] Swelling     Mouth swells up     • Hctz [Hydrochlorothiazide] Shortness of Breath   • Pcn [Penicillins] Hives and Shortness of Breath       Medications  Prior to Admission Medications   Prescriptions Last Dose Informant Patient Reported? Taking?   acetaminophen (TYLENOL) 500 MG Tab >2 days at Unknown Patient Yes Yes   Sig: Take 1,000 mg by mouth every 6 hours as needed for Moderate Pain.   albuterol (VENTOLIN OR PROVENTIL) 108 (90 BASE) MCG/ACT AERS inhalation aerosol 8/17/2021 at 0900 Patient No No   Sig: Inhale 2 Puffs by mouth every 6 hours as needed for Shortness of Breath.   dexamethasone (DECADRON) 6 MG Tab 8/16/2021 at 1800 Patient Yes Yes   Sig: Take 6 mg by mouth every evening. Pt started on 8/14/2021 for 10 day course   guaiFENesin  dextromethorphan (ROBITUSSIN DM) 100-10 MG/5ML Syrup syrup 8/17/2021 at 0000 Patient No No   Sig: Take 5 mL by mouth every 6 hours as needed for Cough for up to 7 days.   levothyroxine (SYNTHROID) 112 MCG Tab 8/16/2021 at 0800 Patient Yes No   Sig: Take 112 mcg by mouth every morning on an empty stomach.      Facility-Administered Medications: None       Physical Exam  Temp:  [36.5 °C (97.7 °F)-36.7 °C (98.1 °F)] 36.5 °C (97.7 °F)  Pulse:  [] 66  Resp:  [20-60] 21  BP: ()/(47-53) 103/51  SpO2:  [92 %-94 %] 93 %    Physical Exam  Vitals and nursing note reviewed.   Constitutional:       Appearance: Normal appearance.   HENT:      Head: Normocephalic and atraumatic.      Right Ear: External ear normal.      Left Ear: External ear normal.      Nose: Nose normal.      Mouth/Throat:      Mouth: Mucous membranes are moist.      Pharynx: Oropharynx is clear.   Eyes:      Extraocular Movements: Extraocular movements intact.      Pupils: Pupils are equal, round, and reactive to light.   Cardiovascular:      Rate and Rhythm: Normal rate and regular rhythm.   Pulmonary:      Effort: Pulmonary effort is normal.      Breath sounds: Normal breath sounds.   Abdominal:      General: Abdomen is flat. Bowel sounds are normal. There is no distension.      Palpations: Abdomen is soft.      Tenderness: There is no abdominal tenderness.   Musculoskeletal:      Cervical back: Normal range of motion and neck supple.      Right lower leg: No edema.      Left lower leg: No edema.   Skin:     General: Skin is warm and dry.   Neurological:      General: No focal deficit present.      Mental Status: She is alert and oriented to person, place, and time.   Psychiatric:         Mood and Affect: Mood normal.         Behavior: Behavior normal.         Laboratory:  Recent Labs     08/17/21  0956   WBC 7.7   RBC 5.09   HEMOGLOBIN 15.3   HEMATOCRIT 45.4   MCV 89.2   MCH 30.1   MCHC 33.7   RDW 38.2   PLATELETCT 194   MPV 9.3     Recent Labs      08/17/21  0956   SODIUM 135   POTASSIUM 3.7   CHLORIDE 97   CO2 27   GLUCOSE 128*   BUN 12   CREATININE 0.52   CALCIUM 8.8     Recent Labs     08/17/21  0956   ALTSGPT 38   ASTSGOT 24   ALKPHOSPHAT 73   TBILIRUBIN 1.0   GLUCOSE 128*         No results for input(s): NTPROBNP in the last 72 hours.      No results for input(s): TROPONINT in the last 72 hours.    Imaging:  DX-CHEST-PORTABLE (1 VIEW)   Final Result      Interval worsening in bilateral scattered pulmonary opacities, consistent          X-Ray:  I have personally reviewed the images and compared with prior images.    Assessment/Plan:  I anticipate this patient will require at least two midnights for appropriate medical management, necessitating inpatient admission.    * Pneumonia due to COVID-19 virus- (present on admission)  Assessment & Plan  - patient is high risk for severe COVID-19  -cont on Decadron.    - continue supportive care, with RT protocol, oxygen supplement. Keep sats above 89%, oxygen supplement as needed. Awake proning as tolerated.   - limit IVFs. Avoid NSAIDS.   Symptom onset on august 1st- more than 14 days with no h/o immunosuppresion- cont supportive measures  - continue symptom control. Monitor for fevers.  - continue contact and droplet isolation with eye protection.  - closely monitor clinically. Trend inflammatory markers: I have ordered CRP, D-dimer, procalcitonin      Acute respiratory failure with hypoxia (HCC)- (present on admission)  Assessment & Plan  Worsening due to covid  Cont decadron  Symptom onset on august 1st- more than 14 days with no h/o immunosuppresion- cont supportive measures      Mild intermittent asthma without complication- (present on admission)  Assessment & Plan  Cont albuterol    Hypothyroid- (present on admission)  Assessment & Plan  Cont synthroid      VTE prophylaxis: enoxaparin ppx

## 2021-08-17 NOTE — TELEPHONE ENCOUNTER
Called patient about low oxygen reading of 86%.   Patient stated she was eating  Will continue to monitor.

## 2021-08-17 NOTE — TELEPHONE ENCOUNTER
Called pt again for low O2 alert. Informed RN Shahrzad that pt's O2 sat is not going up to 90, advised to bump up O2 to 5L (patiently currently on 4L).

## 2021-08-17 NOTE — ED TRIAGE NOTES
Pt BIB EMS from home  Sent by MD for low oxygen    Sent home from hospital on Friday on 5L NC    Pt reports fatigue and low oxygen at home    Pt A & 0 x 4, speech clear      Pt oriented to room, call light within reach, castillo in lowest position

## 2021-08-17 NOTE — TELEPHONE ENCOUNTER
Patient alerted for low oxygen of 81%, patient is currently in the ED at [facility] waiting for a bed on med/tele. Contacted charge nurse to inform her of patients desaturations.

## 2021-08-17 NOTE — TELEPHONE ENCOUNTER
Patient alerted for saturations of 80%, . Call into patient but has not answered x 2 tries. [facility] was already dispatched.

## 2021-08-17 NOTE — ED PROVIDER NOTES
ED Provider Note    CHIEF COMPLAINT  Chief Complaint   Patient presents with   • Sent by MD PARNELL  Gina Kimberli Scott is a 51 y.o. female who presents with hypoxia.  Patient was advised to come into the hospital by the RTOC.  Patient has a history of recent COVID-19 associated hypoxic respiratory failure.  Was discharged from hospital on 2 L nasal cannula.  This ended up being increased to 4 L nasal cannula over the last week.  Oxygen saturations were acceptable despite 4 L nasal cannula and therefore patient was told to come to the hospital.  Patient was transported here by paramedics.  She required 15 L nonrebreather mask to maintain oxygen saturation above 90%.  She states she feels about the same as she did when she was discharged.  Has some mild generalized weakness.  Occasional cough.  No ongoing fever.  Denies chest pain or overt shortness of breath.  No new leg swelling or leg pain.  Denies abdominal pain nausea vomiting.  He has been taking her medications as prescribed    REVIEW OF SYSTEMS  As per HPI, otherwise a 10 point review of systems is negative    PAST MEDICAL HISTORY  Past Medical History:   Diagnosis Date   • Asthma    • Bronchitis    • Chickenpox    • Chronic back pain 2009   • ENDOMETRIOSIS 2009   • Grave's disease 2009   • Hypothyroid 2009   • Hypothyroid    • Hypothyroidism    • Influenza    • Kidney stones    • Mild intermittent asthma without complication 2017   • Osteoporosis    • PND (post-nasal drip)    • Seizure disorder (HCC)    • Sleep apnea    • Spinal cord stimulator status    • Tonsillitis    • Venereal disease    • Vitamin d deficiency 2009       SOCIAL HISTORY  Social History     Tobacco Use   • Smoking status: Former Smoker     Packs/day: 0.15     Years: 22.00     Pack years: 3.30     Types: Cigarettes     Quit date: 2014     Years since quittin.0   • Smokeless tobacco: Never Used   • Tobacco comment: smoked pack and half a  "week in august of 2009   Vaping Use   • Vaping Use: Never used   Substance Use Topics   • Alcohol use: No     Alcohol/week: 0.0 oz   • Drug use: No       SURGICAL HISTORY  Past Surgical History:   Procedure Laterality Date   • ABDOMINAL HYSTERECTOMY TOTAL      ovarian  cyst endometriosis   • APPENDECTOMY     • CHOLECYSTECTOMY     • LAMINOTOMY      fusion L5   • LAPAROTOMY     • OTHER ORTHOPEDIC SURGERY      fusion       CURRENT MEDICATIONS  Home Medications     Reviewed by Christian Baumann (Pharmacy Tech) on 08/17/21 at 1036  Med List Status: Complete   Medication Last Dose Status   acetaminophen (TYLENOL) 500 MG Tab >2 days Active   albuterol (VENTOLIN OR PROVENTIL) 108 (90 BASE) MCG/ACT AERS inhalation aerosol 8/17/2021 Active   dexamethasone (DECADRON) 6 MG Tab 8/16/2021 Active   guaiFENesin dextromethorphan (ROBITUSSIN DM) 100-10 MG/5ML Syrup syrup 8/17/2021 Active   levothyroxine (SYNTHROID) 112 MCG Tab 8/16/2021 Active                ALLERGIES  Allergies   Allergen Reactions   • Darvocet [Propoxyphene N-Apap] Anaphylaxis   • [place] Swelling     Mouth swells up     • Hctz [Hydrochlorothiazide] Shortness of Breath   • Pcn [Penicillins] Hives and Shortness of Breath       PHYSICAL EXAM  VITAL SIGNS: BP (!) 96/51   Pulse 89   Temp 36.7 °C (98.1 °F) (Temporal)   Resp (!) 33   Ht 1.575 m (5' 2\")   Wt 79.4 kg (175 lb)   LMP 01/01/1998   SpO2 94%   BMI 32.01 kg/m²    Constitutional: Awake and alert.  Fatigued appearing mildly breathless  HENT: Normal inspection  Eyes: Normal inspection  Neck: Grossly normal range of motion.  Cardiovascular: Normal heart rate, Normal rhythm.  Symmetric peripheral pulses.   Thorax & Lungs: Decreased breath sounds.  Diffuse crackles.  Abdomen: Bowel sounds normal, soft, non-distended, nontender, no mass  Skin: No obvious rash.  Back: No tenderness, No CVA tenderness.   Extremities: No clubbing, cyanosis, edema, no Homans or cords.  Neurologic: Grossly normal   Psychiatric: " Normal for situation    RADIOLOGY/PROCEDURES  DX-CHEST-PORTABLE (1 VIEW)   Final Result      Interval worsening in bilateral scattered pulmonary opacities, consistent           Imaging is interpreted by radiologist    Labs:  Results for orders placed or performed during the hospital encounter of 08/17/21   CBC WITH DIFFERENTIAL   Result Value Ref Range    WBC 7.7 4.8 - 10.8 K/uL    RBC 5.09 4.20 - 5.40 M/uL    Hemoglobin 15.3 12.0 - 16.0 g/dL    Hematocrit 45.4 37.0 - 47.0 %    MCV 89.2 81.4 - 97.8 fL    MCH 30.1 27.0 - 33.0 pg    MCHC 33.7 33.6 - 35.0 g/dL    RDW 38.2 35.9 - 50.0 fL    Platelet Count 194 164 - 446 K/uL    MPV 9.3 9.0 - 12.9 fL    Neutrophils-Polys 72.00 44.00 - 72.00 %    Lymphocytes 17.00 (L) 22.00 - 41.00 %    Monocytes 11.00 0.00 - 13.40 %    Eosinophils 0.00 0.00 - 6.90 %    Basophils 0.00 0.00 - 1.80 %    Nucleated RBC 0.00 /100 WBC    Neutrophils (Absolute) 5.54 2.00 - 7.15 K/uL    Lymphs (Absolute) 1.31 1.00 - 4.80 K/uL    Monos (Absolute) 0.85 0.00 - 0.85 K/uL    Eos (Absolute) 0.00 0.00 - 0.51 K/uL    Baso (Absolute) 0.00 0.00 - 0.12 K/uL    NRBC (Absolute) 0.00 K/uL   COMP METABOLIC PANEL   Result Value Ref Range    Sodium 135 135 - 145 mmol/L    Potassium 3.7 3.6 - 5.5 mmol/L    Chloride 97 96 - 112 mmol/L    Co2 27 20 - 33 mmol/L    Anion Gap 11.0 7.0 - 16.0    Glucose 128 (H) 65 - 99 mg/dL    Bun 12 8 - 22 mg/dL    Creatinine 0.52 0.50 - 1.40 mg/dL    Calcium 8.8 8.4 - 10.2 mg/dL    AST(SGOT) 24 12 - 45 U/L    ALT(SGPT) 38 2 - 50 U/L    Alkaline Phosphatase 73 30 - 99 U/L    Total Bilirubin 1.0 0.1 - 1.5 mg/dL    Albumin 3.3 3.2 - 4.9 g/dL    Total Protein 6.6 6.0 - 8.2 g/dL    Globulin 3.3 1.9 - 3.5 g/dL    A-G Ratio 1.0 g/dL   URINALYSIS    Specimen: Urine   Result Value Ref Range    Color Yellow     Character Cloudy (A)     Specific Gravity 1.020 <1.035    Ph 7.5 5.0 - 8.0    Glucose Negative Negative mg/dL    Ketones Negative Negative mg/dL    Protein Negative Negative mg/dL     Bilirubin Negative Negative    Nitrite Positive (A) Negative    Leukocyte Esterase Negative Negative    Occult Blood Negative Negative    Micro Urine Req Microscopic    CRP Quantitative (Non-Cardiac)   Result Value Ref Range    Stat C-Reactive Protein 5.52 (H) 0.00 - 0.75 mg/dL   Procalcitonin   Result Value Ref Range    Procalcitonin <0.02 <0.25 ng/mL   LACTIC ACID   Result Value Ref Range    Lactic Acid 1.3 0.5 - 2.0 mmol/L   DIFFERENTIAL MANUAL   Result Value Ref Range    Manual Diff Status PERFORMED    PLATELET ESTIMATE   Result Value Ref Range    Plt Estimation Normal    MORPHOLOGY   Result Value Ref Range    RBC Morphology Normal     Reactive Lymphocytes Few    ESTIMATED GFR   Result Value Ref Range    GFR If African American >60 >60 mL/min/1.73 m 2    GFR If Non African American >60 >60 mL/min/1.73 m 2   D-Dimer   Result Value Ref Range    D-Dimer Screen 1.74 (H) 0.00 - 0.50 ug/mL (FEU)   URINE MICROSCOPIC (W/UA)   Result Value Ref Range    WBC 0-2 /hpf    RBC 0-2 /hpf    Bacteria Few (A) None /hpf    Epithelial Cells Rare Few /hpf    Mucous Threads Moderate /hpf    Urine Crystals Many Amorphous /hpf         COURSE & MEDICAL DECISION MAKING  Patient presents with hypoxic respiratory failure and worsening COVID-19 virus infection.  Chest x-ray demonstrates worsening infiltrates.  Her infiltrates are diffuse.  Procalcitonin normal.  WBC normal.  Unlikely bacterial process.  Blood pressure was mildly soft and patient was given 500 cc of saline.  Other data obtained as noted above.  Patient will need to be admitted to the hospital for further oxygen support.  Hospitalist was paged for admission.    FINAL IMPRESSION  1.  Hypoxic respiratory failure  2.  COVID-19      This dictation was created using voice recognition software. The accuracy of the dictation is limited to the abilities of the software.  The nursing notes were reviewed and certain aspects of this information were incorporated into this  note.      Electronically signed by: Christopher Barton M.D., 8/17/2021 10:42 AM

## 2021-08-17 NOTE — ED NOTES
"[facility] called - although room is marked \"ready\" it is dirty and not ready  Attempted to call report.  Nurse is moving a patient and cannot take the call at this time.  "

## 2021-08-17 NOTE — ASSESSMENT & PLAN NOTE
-cont on Decadron.    - continue supportive care, with RT protocol, oxygen supplement. Keep sats above 89%, oxygen supplement as needed. Awake proning as tolerated.   - limit IVFs.   - continue symptom control. Monitor for fevers.  - D-dimer <3, procal is negative.

## 2021-08-17 NOTE — TELEPHONE ENCOUNTER
Got 2 alerts from [id number].Patient is on 5L on oxygen and sitting on the chair, taking deep breathe and I ask her if she would like me to call BELLASA on her to check on her. Patient said no she fine

## 2021-08-17 NOTE — TELEPHONE ENCOUNTER
Patient got  4 alerts for low sp02 from [phone]. I called the pt twice and no answer, I also called the patient emergency contact number not available. I talk to our night shift charge Shahrzad that I was going to called the [facility], I tried one one time to called the patient and she answer the phone. Patient state she doing fine that she was moving around and  looking for the batteries that needed to be change. I guide the patient to change her batteries. Patient will now take deep breathes and try to relax

## 2021-08-17 NOTE — ASSESSMENT & PLAN NOTE
Worsening due to covid and asthma exacerbation, better with SoluCortef given her asthma exacerbation- consider transition to prednisone in 1-2 days as she started decadron on 8/11  Symptom onset on august 1st- more than 14 days with no h/o immunosuppresion- cont supportive measures  Discussed with ID, does not qualify for Remdesevir

## 2021-08-17 NOTE — TELEPHONE ENCOUNTER
First call into patient as she has alerted for low oxygen from 87-88%. She is currently on 5 liters of oxygen and says she is just waking up and feeling ok. Encouraged patient to focus on deep breathing and work on coughing to clear her lungs.     Second call into patient as she alerted again for low oxygen from 87-88%. Asked patient to try the oxygen tanks to see if her saturations improve. Walked her through switching to the portable tank but saturations remained the same. Instructed her to switch back to the concentrator.    Patient alerted to 84% while switching back to the concentrator. Instructed patient to increase the oxygen to 6 liters. Patient agreed for the RN to dispatch and ambulance to her residence.

## 2021-08-17 NOTE — TELEPHONE ENCOUNTER
Got an alert for low O2 88%, pt said she went to the BR and now trying to relax. Will continue to monitor.
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
## 2021-08-17 NOTE — ED NOTES
Med rec updated and complete  Allergies reviewed  Pt reports no vitamins   Pt reports no antibiotics in the last 30 days, that she had to take at home.    No current facility-administered medications on file prior to encounter.     Current Outpatient Medications on File Prior to Encounter   Medication Sig Dispense Refill   • dexamethasone (DECADRON) 6 MG Tab Take 6 mg by mouth every evening. Pt started on 8/14/2021 for 10 day course     • acetaminophen (TYLENOL) 500 MG Tab Take 1,000 mg by mouth every 6 hours as needed for Moderate Pain.     • guaiFENesin dextromethorphan (ROBITUSSIN DM) 100-10 MG/5ML Syrup syrup Take 5 mL by mouth every 6 hours as needed for Cough for up to 7 days. 118 mL 0   • levothyroxine (SYNTHROID) 112 MCG Tab Take 112 mcg by mouth every morning on an empty stomach.     • albuterol (VENTOLIN OR PROVENTIL) 108 (90 BASE) MCG/ACT AERS inhalation aerosol Inhale 2 Puffs by mouth every 6 hours as needed for Shortness of Breath. 8.5 g 3

## 2021-08-17 NOTE — ED NOTES
Up to Bedside commode independently.  Rodolfo changed out for hospital bed for her comfort.  Urine sample to lab (pt on her menstrual cycle).  Ice and Ice Water given to pt.  Call light within reach.

## 2021-08-18 PROBLEM — J45.901 ACUTE ASTHMA EXACERBATION: Status: ACTIVE | Noted: 2017-01-13

## 2021-08-18 PROBLEM — R73.9 HYPERGLYCEMIA: Status: ACTIVE | Noted: 2021-08-18

## 2021-08-18 LAB
EST. AVERAGE GLUCOSE BLD GHB EST-MCNC: 131 MG/DL
HBA1C MFR BLD: 6.2 % (ref 4–5.6)

## 2021-08-18 PROCEDURE — 700102 HCHG RX REV CODE 250 W/ 637 OVERRIDE(OP): Performed by: INTERNAL MEDICINE

## 2021-08-18 PROCEDURE — 99233 SBSQ HOSP IP/OBS HIGH 50: CPT | Performed by: FAMILY MEDICINE

## 2021-08-18 PROCEDURE — 700111 HCHG RX REV CODE 636 W/ 250 OVERRIDE (IP): Performed by: HOSPITALIST

## 2021-08-18 PROCEDURE — 94640 AIRWAY INHALATION TREATMENT: CPT

## 2021-08-18 PROCEDURE — A9270 NON-COVERED ITEM OR SERVICE: HCPCS | Performed by: HOSPITALIST

## 2021-08-18 PROCEDURE — 700102 HCHG RX REV CODE 250 W/ 637 OVERRIDE(OP): Performed by: HOSPITALIST

## 2021-08-18 PROCEDURE — 94760 N-INVAS EAR/PLS OXIMETRY 1: CPT

## 2021-08-18 PROCEDURE — 700111 HCHG RX REV CODE 636 W/ 250 OVERRIDE (IP): Performed by: FAMILY MEDICINE

## 2021-08-18 PROCEDURE — A9270 NON-COVERED ITEM OR SERVICE: HCPCS | Performed by: INTERNAL MEDICINE

## 2021-08-18 PROCEDURE — 770020 HCHG ROOM/CARE - TELE (206)

## 2021-08-18 RX ORDER — ALBUTEROL SULFATE 90 UG/1
2 AEROSOL, METERED RESPIRATORY (INHALATION)
Status: DISCONTINUED | OUTPATIENT
Start: 2021-08-18 | End: 2021-08-20 | Stop reason: HOSPADM

## 2021-08-18 RX ORDER — BENZONATATE 100 MG/1
100 CAPSULE ORAL 3 TIMES DAILY PRN
Status: DISCONTINUED | OUTPATIENT
Start: 2021-08-18 | End: 2021-08-20 | Stop reason: HOSPADM

## 2021-08-18 RX ADMIN — BENZONATATE 100 MG: 100 CAPSULE ORAL at 17:41

## 2021-08-18 RX ADMIN — ENOXAPARIN SODIUM 40 MG: 40 INJECTION SUBCUTANEOUS at 17:42

## 2021-08-18 RX ADMIN — Medication 1 LOZENGE: at 21:07

## 2021-08-18 RX ADMIN — GUAIFENESIN 600 MG: 600 TABLET, EXTENDED RELEASE ORAL at 17:41

## 2021-08-18 RX ADMIN — DEXAMETHASONE 6 MG: 4 TABLET ORAL at 06:45

## 2021-08-18 RX ADMIN — HYDROCORTISONE SODIUM SUCCINATE 50 MG: 100 INJECTION, POWDER, FOR SOLUTION INTRAMUSCULAR; INTRAVENOUS at 23:00

## 2021-08-18 RX ADMIN — BENZONATATE 100 MG: 100 CAPSULE ORAL at 06:45

## 2021-08-18 RX ADMIN — HYDROCORTISONE SODIUM SUCCINATE 50 MG: 100 INJECTION, POWDER, FOR SOLUTION INTRAMUSCULAR; INTRAVENOUS at 17:41

## 2021-08-18 RX ADMIN — Medication 1 LOZENGE: at 06:38

## 2021-08-18 RX ADMIN — Medication 5 MG: at 20:59

## 2021-08-18 RX ADMIN — LEVOTHYROXINE SODIUM 112 MCG: 0.11 TABLET ORAL at 06:45

## 2021-08-18 ASSESSMENT — COGNITIVE AND FUNCTIONAL STATUS - GENERAL
SUGGESTED CMS G CODE MODIFIER MOBILITY: CH
MOBILITY SCORE: 24
SUGGESTED CMS G CODE MODIFIER DAILY ACTIVITY: CH
DAILY ACTIVITIY SCORE: 24

## 2021-08-18 NOTE — CARE PLAN
The patient is Stable - Low risk of patient condition declining or worsening    Shift Goals  Clinical Goals: Decrease oxygen   Patient Goals: Rest     Progress made toward(s) clinical / shift goals:    Problem: Knowledge Deficit - Standard  Goal: Patient and family/care givers will demonstrate understanding of plan of care, disease process/condition, diagnostic tests and medications  8/18/2021 0458 by Annabel Tobias R.N.  Outcome: Progressing  Discussed POC with pt, pt verbalizes understanding. All questions and concerns addressed.     Problem: Respiratory  Goal: Patient will achieve/maintain optimum respiratory ventilation and gas exchange  Outcome: Progressing   Pt has decreased number of Liters of oxygen to maintain ventilation and gas changes. Will continue to monitor pt and decrease oxygenation needs.        Patient is not progressing towards the following goals:

## 2021-08-18 NOTE — FLOWSHEET NOTE
08/18/21 1354   Events/Summary/Plan   Events/Summary/Plan RN called for tx. Albuterol inhaler was given. MDI with spaced is in the room in patients drawer at bedside.   (Educated pt to prone. Pt is prone post tx tolerating well)   Skin Inspection Respiratory Device Intact   Vital Signs   Pulse 80   Respiration 18   Pulse Oximetry 90 %   $ Pulse Oximetry (Spot Check) Yes   Breath Sounds   RUL Breath Sounds Clear;Diminished   RML Breath Sounds Diminished;Clear   RLL Breath Sounds Fine Crackles;Diminished   LILIA Breath Sounds Clear;Diminished   LLL Breath Sounds Fine Crackles;Diminished   Oxygen   O2 (LPM) 4   O2 Delivery Device Nasal Cannula
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
## 2021-08-18 NOTE — PROGRESS NOTES
Educated PATIENT, and PATIENT FAMILY, S.O: patient on isolation for COVID-19.     Educated on reason for isolation, how the infection may be transmitted, and how to help prevent transmission to others. Educated precautions involves staff and visitors wearing PPE, following Standard Precautions and performing meticulous hand hygiene in order to prevent transmission of infection.      Enhanced Droplet Precautions: Educated that Enhanced Droplet Precautions involves staff and visitors wearing a surgical mask when in the patient room.      In addition,  educated that they may leave their room, but prior to exiting the patient room each time, the patient needs to have on a fresh patient gown, a surgical mask must be worn by the patient while out of the patient room, and perform hand hygiene immediately prior to exiting the room.        Patient transport and mobilization on unit  Educated that they may leave their room, but prior to exiting, the patient needs to have on a fresh patient gown, ensure the potentially infectious area is covered, performing appropriate hand hygiene immediately prior to exiting the room.

## 2021-08-18 NOTE — PROGRESS NOTES
4 Eyes Skin Assessment Completed by NORM Jin and NORM Duke.    Head WDL  Ears WDL  Nose WDL  Mouth WDL  Neck WDL  Breast/Chest WDL  Shoulder Blades WDL  Spine WDL  (R) Arm/Elbow/Hand WDL  (L) Arm/Elbow/Hand WDL  Abdomen WDL  Groin WDL  Scrotum/Coccyx/Buttocks WDL  (R) Leg WDL  (L) Leg WDL  (R) Heel/Foot/Toe WDL  (L) Heel/Foot/Toe WDL          Devices In Places Tele Box and Nasal Cannula      Interventions In Place NC W/Ear Foams, Pillows and Pressure Redistribution Mattress    Possible Skin Injury No    Pictures Uploaded Into Epic No, needs to be completed  Wound Consult Placed N/A  RN Wound Prevention Protocol Ordered No

## 2021-08-18 NOTE — PROGRESS NOTES
Monitor Summary     Rhythm:SR 47-65, touched 45  Measurements: 0.12/0.08/0.44  ECTOPIES: rPVC        Normal Values  Rhythm SR  HR Range    Measurements 0.12-0.20 / 0.06-0.10  / 0.30-0.52

## 2021-08-19 PROBLEM — N39.0 UTI (URINARY TRACT INFECTION): Status: ACTIVE | Noted: 2021-08-19

## 2021-08-19 PROBLEM — R00.1 BRADYCARDIA: Status: ACTIVE | Noted: 2021-08-19

## 2021-08-19 LAB
BACTERIA UR CULT: ABNORMAL
BACTERIA UR CULT: ABNORMAL
D DIMER PPP IA.FEU-MCNC: 1.24 UG/ML (FEU) (ref 0–0.5)
PROCALCITONIN SERPL-MCNC: <0.02 NG/ML
SIGNIFICANT IND 70042: ABNORMAL
SITE SITE: ABNORMAL
SOURCE SOURCE: ABNORMAL

## 2021-08-19 PROCEDURE — 700102 HCHG RX REV CODE 250 W/ 637 OVERRIDE(OP): Performed by: HOSPITALIST

## 2021-08-19 PROCEDURE — 700111 HCHG RX REV CODE 636 W/ 250 OVERRIDE (IP): Performed by: FAMILY MEDICINE

## 2021-08-19 PROCEDURE — 770020 HCHG ROOM/CARE - TELE (206)

## 2021-08-19 PROCEDURE — 84145 PROCALCITONIN (PCT): CPT

## 2021-08-19 PROCEDURE — 85379 FIBRIN DEGRADATION QUANT: CPT

## 2021-08-19 PROCEDURE — 700102 HCHG RX REV CODE 250 W/ 637 OVERRIDE(OP): Performed by: FAMILY MEDICINE

## 2021-08-19 PROCEDURE — A9270 NON-COVERED ITEM OR SERVICE: HCPCS | Performed by: FAMILY MEDICINE

## 2021-08-19 PROCEDURE — 700111 HCHG RX REV CODE 636 W/ 250 OVERRIDE (IP): Performed by: HOSPITALIST

## 2021-08-19 PROCEDURE — 99233 SBSQ HOSP IP/OBS HIGH 50: CPT | Performed by: FAMILY MEDICINE

## 2021-08-19 PROCEDURE — A9270 NON-COVERED ITEM OR SERVICE: HCPCS | Performed by: HOSPITALIST

## 2021-08-19 RX ORDER — DOXYCYCLINE 100 MG/1
100 TABLET ORAL EVERY 12 HOURS
Status: DISCONTINUED | OUTPATIENT
Start: 2021-08-19 | End: 2021-08-20 | Stop reason: HOSPADM

## 2021-08-19 RX ADMIN — LEVOTHYROXINE SODIUM 112 MCG: 0.11 TABLET ORAL at 05:27

## 2021-08-19 RX ADMIN — DOXYCYCLINE 100 MG: 100 TABLET, FILM COATED ORAL at 07:45

## 2021-08-19 RX ADMIN — HYDROCORTISONE SODIUM SUCCINATE 50 MG: 100 INJECTION, POWDER, FOR SOLUTION INTRAMUSCULAR; INTRAVENOUS at 17:58

## 2021-08-19 RX ADMIN — ENOXAPARIN SODIUM 40 MG: 40 INJECTION SUBCUTANEOUS at 17:58

## 2021-08-19 RX ADMIN — HYDROCORTISONE SODIUM SUCCINATE 50 MG: 100 INJECTION, POWDER, FOR SOLUTION INTRAMUSCULAR; INTRAVENOUS at 05:27

## 2021-08-19 RX ADMIN — Medication 5 MG: at 21:18

## 2021-08-19 RX ADMIN — HYDROCORTISONE SODIUM SUCCINATE 50 MG: 100 INJECTION, POWDER, FOR SOLUTION INTRAMUSCULAR; INTRAVENOUS at 12:50

## 2021-08-19 RX ADMIN — DOXYCYCLINE 100 MG: 100 TABLET, FILM COATED ORAL at 18:02

## 2021-08-19 RX ADMIN — HYDROCORTISONE SODIUM SUCCINATE 50 MG: 100 INJECTION, POWDER, FOR SOLUTION INTRAMUSCULAR; INTRAVENOUS at 23:28

## 2021-08-19 ASSESSMENT — ENCOUNTER SYMPTOMS
ABDOMINAL PAIN: 0
VOMITING: 0
NAUSEA: 0
CHILLS: 0
FEVER: 0
COUGH: 1
MYALGIAS: 1
SHORTNESS OF BREATH: 1

## 2021-08-19 ASSESSMENT — PAIN DESCRIPTION - PAIN TYPE: TYPE: ACUTE PAIN

## 2021-08-19 NOTE — PROGRESS NOTES
Telemetry Shift Summary    Rhythm:SB-SR  HR range:42-99 (touchdown to 39)  Ectopy:R-O PVC, R Trig  Measurements:0.12/0.08/0.40    Normal measurements  Rhythm: SR  HR range:   Measurements: 0.12-0.20/0.06-0.10/0.30-0.52    Tele strips reviewed and placed in chart.
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
## 2021-08-19 NOTE — CARE PLAN
Problem: Knowledge Deficit - Standard  Goal: Patient and family/care givers will demonstrate understanding of plan of care, disease process/condition, diagnostic tests and medications  Outcome: Progressing     Problem: Communication  Goal: The ability to communicate needs accurately and effectively will improve  Outcome: Progressing     Problem: Respiratory  Goal: Patient will achieve/maintain optimum respiratory ventilation and gas exchange  Outcome: Progressing   The patient is Stable - Low risk of patient condition declining or worsening    Shift Goals  Clinical Goals: Titrate oxygen  Patient Goals: Decrease oxygen demand, go home     Progress made toward(s) clinical / shift goals:  Progressing     Met with patient at bedside and discussed the plan of care. Patient feels better today, but is very tired because she didn't sleep much last night. Addressed her low heart rate and was told that this is her baseline. Let her know that we are going to try and titrate her oxygen down this shift. Safety precautions in place. No other needs at thistime.

## 2021-08-19 NOTE — PROGRESS NOTES
Telemetry Shift Summary    Rhythm SR/SB  HR Range 44-84; low 0f 42  Ectopy rPVC, rPAC  Measurements 0.12/0.08/0.40        Normal Values  Rhythm SR  HR Range    Measurements 0.12-0.20 / 0.06-0.10  / 0.30-0.52

## 2021-08-19 NOTE — PROGRESS NOTES
Received report from Gilbert. Patient in isolation precautions at this time. No needs voiced by patient at this time.

## 2021-08-19 NOTE — CARE PLAN
The patient is Stable - Low risk of patient condition declining or worsening    Shift Goals  Clinical Goals: Titrate oxygen  Patient Goals: decrease oxygen; rest; sleep    Progress made toward(s) clinical / shift goals:  Monitor oxygen status and titrate oxygen to keep saturation above 89%. Limit disruptions and cluster care to allow patient to rest and sleep.       Problem: Knowledge Deficit - Standard  Goal: Patient and family/care givers will demonstrate understanding of plan of care, disease process/condition, diagnostic tests and medications  Outcome: Progressing     Problem: Respiratory  Goal: Patient will achieve/maintain optimum respiratory ventilation and gas exchange  Outcome: Progressing       Patient is not progressing towards the following goals:N/A

## 2021-08-19 NOTE — PROGRESS NOTES
Telemetry Shift Summary 8/18/21     Rhythm: SB/SR  Rate: 51-80  Measurements: 0.12/0.10/0.40  Ectopy (reported by Monitor Tech): rPVC     Normal Values  Rhythm: Sinus  HR:   Measurements: 0.12-0.20/0.06-0.10/0.30-0.52    Telemetry Shift Summary 8/17/21   Rhythm: SB/SR  Rate: 59-86  Measurements: 0.16/0.10/0.36  Ectopy (reported by Monitor Tech): rPVC     Normal Values  Rhythm: Sinus  HR:   Measurements: 0.12-0.20/0.06-0.10/0.30-0.52

## 2021-08-20 ENCOUNTER — PATIENT OUTREACH (OUTPATIENT)
Dept: HEALTH INFORMATION MANAGEMENT | Facility: OTHER | Age: 51
End: 2021-08-20

## 2021-08-20 ENCOUNTER — TELEPHONE (OUTPATIENT)
Dept: OTHER | Facility: MEDICAL CENTER | Age: 51
End: 2021-08-20

## 2021-08-20 VITALS
BODY MASS INDEX: 32.2 KG/M2 | SYSTOLIC BLOOD PRESSURE: 90 MMHG | HEART RATE: 62 BPM | DIASTOLIC BLOOD PRESSURE: 46 MMHG | HEIGHT: 62 IN | WEIGHT: 175 LBS | OXYGEN SATURATION: 92 % | TEMPERATURE: 97.6 F | RESPIRATION RATE: 18 BRPM

## 2021-08-20 VITALS — OXYGEN SATURATION: 85 % | HEART RATE: 97 BPM

## 2021-08-20 LAB
ALBUMIN SERPL BCP-MCNC: 2.6 G/DL (ref 3.2–4.9)
ALBUMIN/GLOB SERPL: 0.7 G/DL
ALP SERPL-CCNC: 61 U/L (ref 30–99)
ALT SERPL-CCNC: 19 U/L (ref 2–50)
ANION GAP SERPL CALC-SCNC: 9 MMOL/L (ref 7–16)
AST SERPL-CCNC: 8 U/L (ref 12–45)
BASOPHILS # BLD AUTO: 0.2 % (ref 0–1.8)
BASOPHILS # BLD: 0.02 K/UL (ref 0–0.12)
BILIRUB SERPL-MCNC: 0.3 MG/DL (ref 0.1–1.5)
BUN SERPL-MCNC: 17 MG/DL (ref 8–22)
CALCIUM SERPL-MCNC: 8.8 MG/DL (ref 8.4–10.2)
CHLORIDE SERPL-SCNC: 100 MMOL/L (ref 96–112)
CO2 SERPL-SCNC: 26 MMOL/L (ref 20–33)
CREAT SERPL-MCNC: 0.4 MG/DL (ref 0.5–1.4)
EOSINOPHIL # BLD AUTO: 0.08 K/UL (ref 0–0.51)
EOSINOPHIL NFR BLD: 1 % (ref 0–6.9)
ERYTHROCYTE [DISTWIDTH] IN BLOOD BY AUTOMATED COUNT: 36.6 FL (ref 35.9–50)
GLOBULIN SER CALC-MCNC: 3.5 G/DL (ref 1.9–3.5)
GLUCOSE SERPL-MCNC: 158 MG/DL (ref 65–99)
HCT VFR BLD AUTO: 42 % (ref 37–47)
HGB BLD-MCNC: 14.4 G/DL (ref 12–16)
IMM GRANULOCYTES # BLD AUTO: 0.21 K/UL (ref 0–0.11)
IMM GRANULOCYTES NFR BLD AUTO: 2.6 % (ref 0–0.9)
LYMPHOCYTES # BLD AUTO: 0.92 K/UL (ref 1–4.8)
LYMPHOCYTES NFR BLD: 11.3 % (ref 22–41)
MCH RBC QN AUTO: 30.2 PG (ref 27–33)
MCHC RBC AUTO-ENTMCNC: 34.3 G/DL (ref 33.6–35)
MCV RBC AUTO: 88.1 FL (ref 81.4–97.8)
MONOCYTES # BLD AUTO: 0.5 K/UL (ref 0–0.85)
MONOCYTES NFR BLD AUTO: 6.1 % (ref 0–13.4)
NEUTROPHILS # BLD AUTO: 6.41 K/UL (ref 2–7.15)
NEUTROPHILS NFR BLD: 78.8 % (ref 44–72)
NRBC # BLD AUTO: 0 K/UL
NRBC BLD-RTO: 0 /100 WBC
PLATELET # BLD AUTO: 264 K/UL (ref 164–446)
PMV BLD AUTO: 9.4 FL (ref 9–12.9)
POTASSIUM SERPL-SCNC: 4 MMOL/L (ref 3.6–5.5)
PROT SERPL-MCNC: 6.1 G/DL (ref 6–8.2)
RBC # BLD AUTO: 4.77 M/UL (ref 4.2–5.4)
SODIUM SERPL-SCNC: 135 MMOL/L (ref 135–145)
TSH SERPL DL<=0.005 MIU/L-ACNC: 0.4 UIU/ML (ref 0.38–5.33)
WBC # BLD AUTO: 8.1 K/UL (ref 4.8–10.8)

## 2021-08-20 PROCEDURE — 700111 HCHG RX REV CODE 636 W/ 250 OVERRIDE (IP): Performed by: FAMILY MEDICINE

## 2021-08-20 PROCEDURE — A9270 NON-COVERED ITEM OR SERVICE: HCPCS | Performed by: FAMILY MEDICINE

## 2021-08-20 PROCEDURE — 700102 HCHG RX REV CODE 250 W/ 637 OVERRIDE(OP): Performed by: FAMILY MEDICINE

## 2021-08-20 PROCEDURE — A9270 NON-COVERED ITEM OR SERVICE: HCPCS | Performed by: HOSPITALIST

## 2021-08-20 PROCEDURE — 700102 HCHG RX REV CODE 250 W/ 637 OVERRIDE(OP): Performed by: HOSPITALIST

## 2021-08-20 PROCEDURE — 99453 REM MNTR PHYSIOL PARAM SETUP: CPT

## 2021-08-20 PROCEDURE — 99454 REM MNTR PHYSIOL PARAM 16-30: CPT

## 2021-08-20 PROCEDURE — 85025 COMPLETE CBC W/AUTO DIFF WBC: CPT

## 2021-08-20 PROCEDURE — 84443 ASSAY THYROID STIM HORMONE: CPT

## 2021-08-20 PROCEDURE — 99239 HOSP IP/OBS DSCHRG MGMT >30: CPT | Performed by: FAMILY MEDICINE

## 2021-08-20 PROCEDURE — 80053 COMPREHEN METABOLIC PANEL: CPT

## 2021-08-20 RX ORDER — GUAIFENESIN 600 MG/1
600 TABLET, EXTENDED RELEASE ORAL 2 TIMES DAILY PRN
Qty: 28 TABLET | Refills: 0 | Status: SHIPPED
Start: 2021-08-20 | End: 2022-02-12

## 2021-08-20 RX ORDER — NITROFURANTOIN 25; 75 MG/1; MG/1
100 CAPSULE ORAL 2 TIMES DAILY
Qty: 2 CAPSULE | Refills: 0 | Status: SHIPPED | OUTPATIENT
Start: 2021-08-20 | End: 2021-08-21

## 2021-08-20 RX ORDER — GUAIFENESIN/DEXTROMETHORPHAN 100-10MG/5
5 SYRUP ORAL EVERY 6 HOURS PRN
Qty: 118 ML | Refills: 0 | Status: SHIPPED | OUTPATIENT
Start: 2021-08-20 | End: 2021-08-27

## 2021-08-20 RX ORDER — BENZONATATE 100 MG/1
100 CAPSULE ORAL 3 TIMES DAILY PRN
Qty: 60 CAPSULE | Refills: 0 | Status: SHIPPED
Start: 2021-08-20 | End: 2022-02-12

## 2021-08-20 RX ADMIN — DOXYCYCLINE 100 MG: 100 TABLET, FILM COATED ORAL at 05:48

## 2021-08-20 RX ADMIN — LEVOTHYROXINE SODIUM 112 MCG: 0.11 TABLET ORAL at 05:48

## 2021-08-20 RX ADMIN — HYDROCORTISONE SODIUM SUCCINATE 50 MG: 100 INJECTION, POWDER, FOR SOLUTION INTRAMUSCULAR; INTRAVENOUS at 05:48

## 2021-08-20 RX ADMIN — HYDROCORTISONE SODIUM SUCCINATE 50 MG: 100 INJECTION, POWDER, FOR SOLUTION INTRAMUSCULAR; INTRAVENOUS at 11:36

## 2021-08-20 ASSESSMENT — PAIN DESCRIPTION - PAIN TYPE: TYPE: ACUTE PAIN

## 2021-08-20 NOTE — DISCHARGE PLANNING
Anticipated Discharge Disposition: Home    Action: LSW completed chart review. Discussed pt in morning rounds. Per MD, pt is cleared to d/c today. Pt has a 6-clicks score of 24.     Pt has oxygen at home, baseline is 2-4L. Pt currently on 4L in hospital.     No d/c needs reported or identified at this time.    Barriers to Discharge: None    Plan: LSW to follow and assist as needed.

## 2021-08-20 NOTE — CARE PLAN
Problem: Knowledge Deficit - Standard  Goal: Patient and family/care givers will demonstrate understanding of plan of care, disease process/condition, diagnostic tests and medications  Outcome: Progressing     Problem: Communication  Goal: The ability to communicate needs accurately and effectively will improve  Outcome: Progressing     Problem: Respiratory  Goal: Patient will achieve/maintain optimum respiratory ventilation and gas exchange  Outcome: Progressing   The patient is Stable - Low risk of patient condition declining or worsening    Shift Goals  Clinical Goals: Titrate oxygen, go home   Patient Goals: Go home     Progress made toward(s) clinical / shift goals:  Progressing     Met with patient at bedside. Discussed plan of care and intent to discharge. Talked about home O2. Patient is awake and alert and has no other questions or concerns. Safety precautions in place.

## 2021-08-20 NOTE — PROGRESS NOTES
Went over discharge paperwork with patient. Answered all questions and concerns. DCd IV x 1 and tele box. Patient left unit with transport to private car with son.
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
## 2021-08-20 NOTE — PROGRESS NOTES
Received report from Gilbert. Patient is on isolation precautions at this time. Safety precautions, also, in place. Assumed care of patient. Patient has not voiced any needs at this time.

## 2021-08-20 NOTE — PROGRESS NOTES
Telemetry Shift Summary    Rhythm:SB-SR  HR range:43-78  Ectopy:R PVC, R PAC   Measurements:0.12/0.06/0.36    Normal measurements  Rhythm: SR  HR range:   Measurements: 0.12-0.20/0.06-0.10/0.30-0.52    Tele strips reviewed and placed in chart.

## 2021-08-20 NOTE — RESPIRATORY CARE
REMOTE MONITORING PROGRAM by RESPIRATORY THERAPY  8/20/2021 at 11:59 AM by Rosaura Butler, RRT     Patient interviewed by RT. Patient agrees to Remote Monitoring program. Device instruction performed with welcome packet, consent signed and placed at bedside chart. Patient instructed on how to use MyChart and Zoom. No questions at this time.     Flyer and education performed on remote monitoring with bedside RN complete.

## 2021-08-20 NOTE — PROGRESS NOTES
Telemetry Shift Summary    Rhythm SR/SB  HR Range 46-73; low 38  Ectopy rPVC  Measurements 0.12/0.08/0.44        Normal Values  Rhythm SR  HR Range    Measurements 0.12-0.20 / 0.06-0.10  / 0.30-0.52

## 2021-08-20 NOTE — DISCHARGE INSTRUCTIONS
Discharge Instructions    Discharged to home by car with relative. Discharged via wheelchair, hospital escort: Yes.  Special equipment needed: Oxygen    Be sure to schedule a follow-up appointment with your primary care doctor or any specialists as instructed.     Discharge Plan:   Diet Plan: Discussed  Activity Level: Discussed  Confirmed Follow up Appointment: Patient to Call and Schedule Appointment  Confirmed Symptoms Management: Discussed  Medication Reconciliation Updated: Yes    I understand that a diet low in cholesterol, fat, and sodium is recommended for good health. Unless I have been given specific instructions below for another diet, I accept this instruction as my diet prescription.   Other diet: Regular     Special Instructions: None    · Is patient discharged on Warfarin / Coumadin?   No     Depression / Suicide Risk    As you are discharged from this [facility] facility, it is important to learn how to keep safe from harming yourself.    Recognize the warning signs:  · Abrupt changes in personality, positive or negative- including increase in energy   · Giving away possessions  · Change in eating patterns- significant weight changes-  positive or negative  · Change in sleeping patterns- unable to sleep or sleeping all the time   · Unwillingness or inability to communicate  · Depression  · Unusual sadness, discouragement and loneliness  · Talk of wanting to die  · Neglect of personal appearance   · Rebelliousness- reckless behavior  · Withdrawal from people/activities they love  · Confusion- inability to concentrate     If you or a loved one observes any of these behaviors or has concerns about self-harm, here's what you can do:  · Talk about it- your feelings and reasons for harming yourself  · Remove any means that you might use to hurt yourself (examples: pills, rope, extension cords, firearm)  · Get professional help from the community (Mental Health, Substance Abuse, psychological  counseling)  · Do not be alone:Call your Safe Contact- someone whom you trust who will be there for you.  · Call your local CRISIS HOTLINE 455-5657 or 069-156-9336  · Call your local Children's Mobile Crisis Response Team [place] (057) 462-5897 or www.Jolicloud  · Call the toll free National Suicide Prevention Hotlines   · National Suicide Prevention Lifeline 706-787-PZOK (0745)  · National Hope Line Network 800-SUICIDE (646-1081)        COVID-19  COVID-19 is a respiratory infection that is caused by a virus called severe acute respiratory syndrome coronavirus 2 (SARS-CoV-2). The disease is also known as coronavirus disease or novel coronavirus. In some people, the virus may not cause any symptoms. In others, it may cause a serious infection. The infection can get worse quickly and can lead to complications, such as:  · Pneumonia, or infection of the lungs.  · Acute respiratory distress syndrome or ARDS. This is fluid build-up in the lungs.  · Acute respiratory failure. This is a condition in which there is not enough oxygen passing from the lungs to the body.  · Sepsis or septic shock. This is a serious bodily reaction to an infection.  · Blood clotting problems.  · Secondary infections due to bacteria or fungus.  The virus that causes COVID-19 is contagious. This means that it can spread from person to person through droplets from coughs and sneezes (respiratory secretions).  What are the causes?  This illness is caused by a virus. You may catch the virus by:  · Breathing in droplets from an infected person's cough or sneeze.  · Touching something, like a table or a doorknob, that was exposed to the virus (contaminated) and then touching your mouth, nose, or eyes.  What increases the risk?  Risk for infection  You are more likely to be infected with this virus if you:  · Live in or travel to an area with a COVID-19 outbreak.  · Come in contact with a sick person who recently traveled to an area with a  COVID-19 outbreak.  · Provide care for or live with a person who is infected with COVID-19.  Risk for serious illness  You are more likely to become seriously ill from the virus if you:  · Are 65 years of age or older.  · Have a long-term disease that lowers your body's ability to fight infection (immunocompromised).  · Live in a nursing home or long-term care facility.  · Have a long-term (chronic) disease such as:  ? Chronic lung disease, including chronic obstructive pulmonary disease or asthma  ? Heart disease.  ? Diabetes.  ? Chronic kidney disease.  ? Liver disease.  · Are obese.  What are the signs or symptoms?  Symptoms of this condition can range from mild to severe. Symptoms may appear any time from 2 to 14 days after being exposed to the virus. They include:  · A fever.  · A cough.  · Difficulty breathing.  · Chills.  · Muscle pains.  · A sore throat.  · Loss of taste or smell.  Some people may also have stomach problems, such as nausea, vomiting, or diarrhea.  Other people may not have any symptoms of COVID-19.  How is this diagnosed?  This condition may be diagnosed based on:  · Your signs and symptoms, especially if:  ? You live in an area with a COVID-19 outbreak.  ? You recently traveled to or from an area where the virus is common.  ? You provide care for or live with a person who was diagnosed with COVID-19.  · A physical exam.  · Lab tests, which may include:  ? A nasal swab to take a sample of fluid from your nose.  ? A throat swab to take a sample of fluid from your throat.  ? A sample of mucus from your lungs (sputum).  ? Blood tests.  · Imaging tests, which may include, X-rays, CT scan, or ultrasound.  How is this treated?  At present, there is no medicine to treat COVID-19. Medicines that treat other diseases are being used on a trial basis to see if they are effective against COVID-19.  Your health care provider will talk with you about ways to treat your symptoms. For most people, the  infection is mild and can be managed at home with rest, fluids, and over-the-counter medicines.  Treatment for a serious infection usually takes places in a hospital intensive care unit (ICU). It may include one or more of the following treatments. These treatments are given until your symptoms improve.  · Receiving fluids and medicines through an IV.  · Supplemental oxygen. Extra oxygen is given through a tube in the nose, a face mask, or a yanes.  · Positioning you to lie on your stomach (prone position). This makes it easier for oxygen to get into the lungs.  · Continuous positive airway pressure (CPAP) or bi-level positive airway pressure (BPAP) machine. This treatment uses mild air pressure to keep the airways open. A tube that is connected to a motor delivers oxygen to the body.  · Ventilator. This treatment moves air into and out of the lungs by using a tube that is placed in your windpipe.  · Tracheostomy. This is a procedure to create a hole in the neck so that a breathing tube can be inserted.  · Extracorporeal membrane oxygenation (ECMO). This procedure gives the lungs a chance to recover by taking over the functions of the heart and lungs. It supplies oxygen to the body and removes carbon dioxide.  Follow these instructions at home:  Lifestyle  · If you are sick, stay home except to get medical care. Your health care provider will tell you how long to stay home. Call your health care provider before you go for medical care.  · Rest at home as told by your health care provider.  · Do not use any products that contain nicotine or tobacco, such as cigarettes, e-cigarettes, and chewing tobacco. If you need help quitting, ask your health care provider.  · Return to your normal activities as told by your health care provider. Ask your health care provider what activities are safe for you.  General instructions  · Take over-the-counter and prescription medicines only as told by your health care  provider.  · Drink enough fluid to keep your urine pale yellow.  · Keep all follow-up visits as told by your health care provider. This is important.  How is this prevented?    There is no vaccine to help prevent COVID-19 infection. However, there are steps you can take to protect yourself and others from this virus.  To protect yourself:   · Do not travel to areas where COVID-19 is a risk. The areas where COVID-19 is reported change often. To identify high-risk areas and travel restrictions, check the CDC travel website: wwwnc.cdc.gov/travel/notices  · If you live in, or must travel to, an area where COVID-19 is a risk, take precautions to avoid infection.  ? Stay away from people who are sick.  ? Wash your hands often with soap and water for 20 seconds. If soap and water are not available, use an alcohol-based hand .  ? Avoid touching your mouth, face, eyes, or nose.  ? Avoid going out in public, follow guidance from your state and local health authorities.  ? If you must go out in public, wear a cloth face covering or face mask.  ? Disinfect objects and surfaces that are frequently touched every day. This may include:  § Counters and tables.  § Doorknobs and light switches.  § Sinks and faucets.  § Electronics, such as phones, remote controls, keyboards, computers, and tablets.  To protect others:  If you have symptoms of COVID-19, take steps to prevent the virus from spreading to others.  · If you think you have a COVID-19 infection, contact your health care provider right away. Tell your health care team that you think you may have a COVID-19 infection.  · Stay home. Leave your house only to seek medical care. Do not use public transport.  · Do not travel while you are sick.  · Wash your hands often with soap and water for 20 seconds. If soap and water are not available, use alcohol-based hand .  · Stay away from other members of your household. Let healthy household members care for children  and pets, if possible. If you have to care for children or pets, wash your hands often and wear a mask. If possible, stay in your own room, separate from others. Use a different bathroom.  · Make sure that all people in your household wash their hands well and often.  · Cough or sneeze into a tissue or your sleeve or elbow. Do not cough or sneeze into your hand or into the air.  · Wear a cloth face covering or face mask.  Where to find more information  · Centers for Disease Control and Prevention: www.cdc.gov/coronavirus/2019-ncov/index.html  · World Health Organization: www.who.int/health-topics/coronavirus  Contact a health care provider if:  · You live in or have traveled to an area where COVID-19 is a risk and you have symptoms of the infection.  · You have had contact with someone who has COVID-19 and you have symptoms of the infection.  Get help right away if:  · You have trouble breathing.  · You have pain or pressure in your chest.  · You have confusion.  · You have bluish lips and fingernails.  · You have difficulty waking from sleep.  · You have symptoms that get worse.  These symptoms may represent a serious problem that is an emergency. Do not wait to see if the symptoms will go away. Get medical help right away. Call your local emergency services (911 in the [place] Do not drive yourself to the hospital. Let the emergency medical personnel know if you think you have COVID-19.  Summary  · COVID-19 is a respiratory infection that is caused by a virus. It is also known as coronavirus disease or novel coronavirus. It can cause serious infections, such as pneumonia, acute respiratory distress syndrome, acute respiratory failure, or sepsis.  · The virus that causes COVID-19 is contagious. This means that it can spread from person to person through droplets from coughs and sneezes.  · You are more likely to develop a serious illness if you are 65 years of age or older, have a weak immunity, live in a nursing  home, or have chronic disease.  · There is no medicine to treat COVID-19. Your health care provider will talk with you about ways to treat your symptoms.  · Take steps to protect yourself and others from infection. Wash your hands often and disinfect objects and surfaces that are frequently touched every day. Stay away from people who are sick and wear a mask if you are sick.  This information is not intended to replace advice given to you by your health care provider. Make sure you discuss any questions you have with your health care provider.  Document Released: 01/23/2020 Document Revised: 05/14/2020 Document Reviewed: 01/23/2020  [employer] Patient Education © 2020 [employer]    COVID-19: How to Protect Yourself and Others  Know how it spreads  · There is currently no vaccine to prevent coronavirus disease 2019 (COVID-19).  · The best way to prevent illness is to avoid being exposed to this virus.  · The virus is thought to spread mainly from person-to-person.  ? Between people who are in close contact with one another (within about 6 feet).  ? Through respiratory droplets produced when an infected person coughs, sneezes or talks.  ? These droplets can land in the mouths or noses of people who are nearby or possibly be inhaled into the lungs.  ? Some recent studies have suggested that COVID-19 may be spread by people who are not showing symptoms.  Everyone should  Clean your hands often  · Wash your hands often with soap and water for at least 20 seconds especially after you have been in a public place, or after blowing your nose, coughing, or sneezing.  · If soap and water are not readily available, use a hand  that contains at least 60% alcohol. Cover all surfaces of your hands and rub them together until they feel dry.  · Avoid touching your eyes, nose, and mouth with unwashed hands.  Avoid close contact  · Stay home if you are sick.  · Avoid close contact with people who are sick.  · Put distance  between yourself and other people.  ? Remember that some people without symptoms may be able to spread virus.  ? This is especially important for people who are at higher risk of getting very sick.www.cdc.gov/coronavirus/2019-ncov/need-extra-precautions/people-at-higher-risk.html  Cover your mouth and nose with a cloth face cover when around others  · You could spread COVID-19 to others even if you do not feel sick.  · Everyone should wear a cloth face cover when they have to go out in public, for example to the grocery store or to  other necessities.  ? Cloth face coverings should not be placed on young children under age 2, anyone who has trouble breathing, or is unconscious, incapacitated or otherwise unable to remove the mask without assistance.  · The cloth face cover is meant to protect other people in case you are infected.  · Do NOT use a facemask meant for a healthcare worker.  · Continue to keep about 6 feet between yourself and others. The cloth face cover is not a substitute for social distancing.  Cover coughs and sneezes  · If you are in a private setting and do not have on your cloth face covering, remember to always cover your mouth and nose with a tissue when you cough or sneeze or use the inside of your elbow.  · Throw used tissues in the trash.  · Immediately wash your hands with soap and water for at least 20 seconds. If soap and water are not readily available, clean your hands with a hand  that contains at least 60% alcohol.  Clean and disinfect  · Clean AND disinfect frequently touched surfaces daily. This includes tables, doorknobs, light switches, countertops, handles, desks, phones, keyboards, toilets, faucets, and sinks. www.cdc.gov/coronavirus/2019-ncov/prevent-getting-sick/disinfecting-your-home.html  · If surfaces are dirty, clean them: Use detergent or soap and water prior to disinfection.  · Then, use a household disinfectant. You can see a list of EPA-registered  household disinfectants here.  cdc.gov/coronavirus  05/05/2020  This information is not intended to replace advice given to you by your health care provider. Make sure you discuss any questions you have with your health care provider.  Document Released: 04/14/2020 Document Revised: 05/13/2020 Document Reviewed: 04/14/2020  [employer] Patient Education © 2020 [employer]      COVID-19 Frequently Asked Questions  COVID-19 (coronavirus disease) is an infection that is caused by a large family of viruses. Some viruses cause illness in people and others cause illness in animals like camels, cats, and bats. In some cases, the viruses that cause illness in animals can spread to humans.  Where did the coronavirus come from?  In December 2019, [place] told the World Health Organization (WHO) of several cases of lung disease (human respiratory illness). These cases were linked to an open seafood and livestock market in the city of [facility]. The link to the seafood and livestock market suggests that the virus may have spread from animals to humans. However, since that first outbreak in December, the virus has also been shown to spread from person to person.  What is the name of the disease and the virus?  Disease name  Early on, this disease was called novel coronavirus. This is because scientists determined that the disease was caused by a new (novel) respiratory virus. The World Health Organization (WHO) has now named the disease COVID-19, or coronavirus disease.  Virus name  The virus that causes the disease is called severe acute respiratory syndrome coronavirus 2 (SARS-CoV-2).  More information on disease and virus naming  World Health Organization (WHO): www.who.int/emergencies/diseases/novel-coronavirus-2019/technical-guidance/naming-the-coronavirus-disease-(covid-2019)-and-the-virus-that-causes-it  Who is at risk for complications from coronavirus disease?  Some people may be at higher risk for complications from  coronavirus disease. This includes older adults and people who have chronic diseases, such as heart disease, diabetes, and lung disease.  If you are at higher risk for complications, take these extra precautions:  · Avoid close contact with people who are sick or have a fever or cough. Stay at least 3-6 ft (1-2 m) away from them, if possible.  · Wash your hands often with soap and water for at least 20 seconds.  · Avoid touching your face, mouth, nose, or eyes.  · Keep supplies on hand at home, such as food, medicine, and cleaning supplies.  · Stay home as much as possible.  · Avoid social gatherings and travel.  How does coronavirus disease spread?  The virus that causes coronavirus disease spreads easily from person to person (is contagious). There are also cases of community-spread disease. This means the disease has spread to:  · People who have no known contact with other infected people.  · People who have not traveled to areas where there are known cases.  It appears to spread from one person to another through droplets from coughing or sneezing.  Can I get the virus from touching surfaces or objects?  There is still a lot that we do not know about the virus that causes coronavirus disease. Scientists are basing a lot of information on what they know about similar viruses, such as:  · Viruses cannot generally survive on surfaces for long. They need a human body (host) to survive.  · It is more likely that the virus is spread by close contact with people who are sick (direct contact), such as through:  ? Shaking hands or hugging.  ? Breathing in respiratory droplets that travel through the air. This can happen when an infected person coughs or sneezes on or near other people.  · It is less likely that the virus is spread when a person touches a surface or object that has the virus on it (indirect contact). The virus may be able to enter the body if the person touches a surface or object and then touches his or  her face, eyes, nose, or mouth.  Can a person spread the virus without having symptoms of the disease?  It may be possible for the virus to spread before a person has symptoms of the disease, but this is most likely not the main way the virus is spreading. It is more likely for the virus to spread by being in close contact with people who are sick and breathing in the respiratory droplets of a sick person's cough or sneeze.  What are the symptoms of coronavirus disease?  Symptoms vary from person to person and can range from mild to severe. Symptoms may include:  · Fever.  · Cough.  · Tiredness, weakness, or fatigue.  · Fast breathing or feeling short of breath.  These symptoms can appear anywhere from 2 to 14 days after you have been exposed to the virus. If you develop symptoms, call your health care provider. People with severe symptoms may need hospital care.  If I am exposed to the virus, how long does it take before symptoms start?  Symptoms of coronavirus disease may appear anywhere from 2 to 14 days after a person has been exposed to the virus. If you develop symptoms, call your health care provider.  Should I be tested for this virus?  Your health care provider will decide whether to test you based on your symptoms, history of exposure, and your risk factors.  How does a health care provider test for this virus?  Health care providers will collect samples to send for testing. Samples may include:  · Taking a swab of fluid from the nose.  · Taking fluid from the lungs by having you cough up mucus (sputum) into a sterile cup.  · Taking a blood sample.  · Taking a stool or urine sample.  Is there a treatment or vaccine for this virus?  Currently, there is no vaccine to prevent coronavirus disease. Also, there are no medicines like antibiotics or antivirals to treat the virus. A person who becomes sick is given supportive care, which means rest and fluids. A person may also relieve his or her symptoms by using  over-the-counter medicines that treat sneezing, coughing, and runny nose. These are the same medicines that a person takes for the common cold.  If you develop symptoms, call your health care provider. People with severe symptoms may need hospital care.  What can I do to protect myself and my family from this virus?         You can protect yourself and your family by taking the same actions that you would take to prevent the spread of other viruses. Take the following actions:  · Wash your hands often with soap and water for at least 20 seconds. If soap and water are not available, use alcohol-based hand .  · Avoid touching your face, mouth, nose, or eyes.  · Cough or sneeze into a tissue, sleeve, or elbow. Do not cough or sneeze into your hand or the air.  ? If you cough or sneeze into a tissue, throw it away immediately and wash your hands.  · Disinfect objects and surfaces that you frequently touch every day.  · Avoid close contact with people who are sick or have a fever or cough. Stay at least 3-6 ft (1-2 m) away from them, if possible.  · Stay home if you are sick, except to get medical care. Call your health care provider before you get medical care.  · Make sure your vaccines are up to date. Ask your health care provider what vaccines you need.  What should I do if I need to travel?  Follow travel recommendations from your local health authority, the CDC, and WHO.  Travel information and advice  · Centers for Disease Control and Prevention (CDC): www.cdc.gov/coronavirus/2019-ncov/travelers/index.html  · World Health Organization (WHO): www.who.int/emergencies/diseases/novel-coronavirus-2019/travel-advice  Know the risks and take action to protect your health  · You are at higher risk of getting coronavirus disease if you are traveling to areas with an outbreak or if you are exposed to travelers from areas with an outbreak.  · Wash your hands often and practice good hygiene to lower the risk of  catching or spreading the virus.  What should I do if I am sick?  General instructions to stop the spread of infection  · Wash your hands often with soap and water for at least 20 seconds. If soap and water are not available, use alcohol-based hand .  · Cough or sneeze into a tissue, sleeve, or elbow. Do not cough or sneeze into your hand or the air.  · If you cough or sneeze into a tissue, throw it away immediately and wash your hands.  · Stay home unless you must get medical care. Call your health care provider or local health authority before you get medical care.  · Avoid public areas. Do not take public transportation, if possible.  · If you can, wear a mask if you must go out of the house or if you are in close contact with someone who is not sick.  Keep your home clean  · Disinfect objects and surfaces that are frequently touched every day. This may include:  ? Counters and tables.  ? Doorknobs and light switches.  ? Sinks and faucets.  ? Electronics such as phones, remote controls, keyboards, computers, and tablets.  · Wash dishes in hot, soapy water or use a . Air-dry your dishes.  · Wash laundry in hot water.  Prevent infecting other household members  · Let healthy household members care for children and pets, if possible. If you have to care for children or pets, wash your hands often and wear a mask.  · Sleep in a different bedroom or bed, if possible.  · Do not share personal items, such as razors, toothbrushes, deodorant, souza, brushes, towels, and washcloths.  Where to find more information  Centers for Disease Control and Prevention (CDC)  · Information and news updates: www.cdc.gov/coronavirus/2019-ncov  World Health Organization (WHO)  · Information and news updates: www.who.int/emergencies/diseases/novel-coronavirus-2019  · Coronavirus health topic: www.who.int/health-topics/coronavirus  · Questions and answers on COVID-19:  www.who.int/news-room/q-a-detail/d-x-xxvejciywuzvx  · Global tracker: who.Packetworx  American Academy of Pediatrics (AAP)  · Information for families: www.healthychildren.org/English/health-issues/conditions/chest-lungs/Pages/2019-Novel-Coronavirus.aspx  The coronavirus situation is changing rapidly. Check your local health authority website or the CDC and WHO websites for updates and news.  When should I contact a health care provider?  · Contact your health care provider if you have symptoms of an infection, such as fever or cough, and you:  ? Have been near anyone who is known to have coronavirus disease.  ? Have come into contact with a person who is suspected to have coronavirus disease.  ? Have traveled outside of the country.  When should I get emergency medical care?  · Get help right away by calling your local emergency services (911 in the [place] if you have:  ? Trouble breathing.  ? Pain or pressure in your chest.  ? Confusion.  ? Blue-tinged lips and fingernails.  ? Difficulty waking from sleep.  ? Symptoms that get worse.  Let the emergency medical personnel know if you think you have coronavirus disease.  Summary  · A new respiratory virus is spreading from person to person and causing COVID-19 (coronavirus disease).  · The virus that causes COVID-19 appears to spread easily. It spreads from one person to another through droplets from coughing or sneezing.  · Older adults and those with chronic diseases are at higher risk of disease. If you are at higher risk for complications, take extra precautions.  · There is currently no vaccine to prevent coronavirus disease. There are no medicines, such as antibiotics or antivirals, to treat the virus.  · You can protect yourself and your family by washing your hands often, avoiding touching your face, and covering your coughs and sneezes.  This information is not intended to replace advice given to you by your health care provider. Make sure you discuss any  questions you have with your health care provider.  Document Released: 04/14/2020 Document Revised: 04/14/2020 Document Reviewed: 04/14/2020  Elsevier Patient Education © 2020 [employer]      Hypoxia  Hypoxia is a condition that happens when there is a lack of oxygen in the body's tissues and organs. When there is not enough oxygen, organs cannot work as they should. This causes serious problems throughout the body and in the brain.  What are the causes?  This condition may be caused by:  · Exposure to high altitude.  · A collapsed lung (pneumothorax).  · Lung infection (pneumonia).  · Lung injury.  · Long-term (chronic) lung disease, such as COPD (chronic obstructive pulmonary disease).  · Blood collecting in the chest cavity (hemothorax).  · Food, saliva, or vomit getting into the airway (aspiration).  · Reduced blood flow (ischemia).  · Severe blood loss.  · Slow or shallow breathing (hypoventilation).  · Blood disorders, such as anemia.  · Carbon monoxide poisoning.  · The heart suddenly stopping (cardiac arrest).  · Anesthetic medicines.  · Drowning.  · Choking.  What are the signs or symptoms?  Symptoms of this condition include:  · Headache.  · Fatigue.  · Drowsiness.  · Forgetfulness.  · Nausea.  · Confusion.  · Shortness of breath.  · Dizziness.  · Bluish color of the skin, lips, or nail beds (cyanosis).  · Change in consciousness or awareness.  If hypoxia is not treated, it can lead to convulsions, loss of consciousness (coma), or brain damage.  How is this diagnosed?  This condition may be diagnosed based on:  · A physical exam.  · Blood tests.  · A test that measures how much oxygen is in your blood (pulse oximetry). This is done with a sensor that is placed on your finger, toe, or earlobe.  · Chest X-ray.  · Tests to check your lung function (pulmonary function tests).  · A test to check the electrical activity of your heart (electrocardiogram, ECG).  You may have other tests to determine the cause  of your hypoxia.  How is this treated?    Treatment for this condition depends on what is causing the hypoxia. You will likely be treated with oxygen therapy. This may be done by giving you oxygen through a face mask or through tubes in your nose.  Your health care provider may also recommend other therapies to treat the underlying cause of your hypoxia.  Follow these instructions at home:  · Take over-the-counter and prescription medicines only as told by your health care provider.  · Do not use any products that contain nicotine or tobacco, such as cigarettes and e-cigarettes. If you need help quitting, ask your health care provider.  · Avoid secondhand smoke.  · Work with your health care provider to manage any chronic conditions you have that may be causing hypoxia, such as COPD.  · Keep all follow-up visits as told by your health care provider. This is important.  Contact a health care provider if:  · You have a fever.  · You have trouble breathing, even after treatment.  · You become extremely short of breath when you exercise.  Get help right away if:  · Your shortness of breath gets worse, especially with normal or very little activity.  · Your skin, lips, or nail beds have a bluish color.  · You become confused or you cannot think properly.  · You have chest pain.  Summary  · Hypoxia is a condition that happens when there is a lack of oxygen in the body's tissues and organs.  · If hypoxia is not treated, it can lead to convulsions, loss of consciousness (coma), or brain damage.  · Symptoms of hypoxia can include a headache, shortness of breath, confusion, nausea, and a bluish skin color.  · Hypoxia has many possible causes, including exposure to high altitude, carbon monoxide poisoning, or other health issues, such as blood disorders or cardiac arrest.  · Hypoxia is usually treated with oxygen therapy.  This information is not intended to replace advice given to you by your health care provider. Make sure  you discuss any questions you have with your health care provider.    Diet: Diet Order Diet: Regular  Activity: As tolerated  Follow Up: PCP in two weeks  Disposition:Home  Diagnosis: Pneumonia due to COVID-19 virus    Follow up with your Primary Care Provider Pcp Pt States None as scheduled or sooner if your symptoms persist or worsen.  Return to Emergency Room for severe chest pain, shortness of breath, signs of a stroke, or any other emergencies.  YOUR LAST DOSE OF DECADRON WILL BE TOMORROW

## 2021-08-20 NOTE — CARE PLAN
The patient is Stable - Low risk of patient condition declining or worsening    Shift Goals  Clinical Goals: Wean oxygen  Patient Goals: Wean oxygen; go home    Progress made toward(s) clinical / shift goals:  Continue to monitor oxygen and titrate as possible. Work with interdisciplinary team for discharge.      Problem: Knowledge Deficit - Standard  Goal: Patient and family/care givers will demonstrate understanding of plan of care, disease process/condition, diagnostic tests and medications  Outcome: Progressing     Problem: Respiratory  Goal: Patient will achieve/maintain optimum respiratory ventilation and gas exchange  Outcome: Progressing       Patient is not progressing towards the following goals:N/A

## 2021-08-21 ENCOUNTER — TELEPHONE (OUTPATIENT)
Dept: OTHER | Facility: MEDICAL CENTER | Age: 51
End: 2021-08-21

## 2021-08-21 VITALS — RESPIRATION RATE: 20 BRPM | HEART RATE: 107 BPM | OXYGEN SATURATION: 86 %

## 2021-08-21 VITALS — HEART RATE: 115 BPM | OXYGEN SATURATION: 83 % | RESPIRATION RATE: 17 BRPM

## 2021-08-21 VITALS — RESPIRATION RATE: 16 BRPM | OXYGEN SATURATION: 88 % | HEART RATE: 89 BPM

## 2021-08-21 NOTE — TELEPHONE ENCOUNTER
Called patient she advised she is okay and doing dishes I also informed her that if noone answers the phone when we call on an alert we will send sindhu she understood

## 2021-08-22 LAB
BACTERIA BLD CULT: NORMAL
SIGNIFICANT IND 70042: NORMAL
SITE SITE: NORMAL
SOURCE SOURCE: NORMAL

## 2021-08-23 ENCOUNTER — TELEPHONE (OUTPATIENT)
Dept: OTHER | Facility: MEDICAL CENTER | Age: 51
End: 2021-08-23

## 2021-08-23 ENCOUNTER — PATIENT OUTREACH (OUTPATIENT)
Dept: HEALTH INFORMATION MANAGEMENT | Facility: OTHER | Age: 51
End: 2021-08-23

## 2021-08-23 VITALS — OXYGEN SATURATION: 90 % | RESPIRATION RATE: 20 BRPM | HEART RATE: 106 BPM

## 2021-08-23 LAB
BACTERIA BLD CULT: NORMAL
SIGNIFICANT IND 70042: NORMAL
SITE SITE: NORMAL
SOURCE SOURCE: NORMAL

## 2021-08-24 ENCOUNTER — TELEPHONE (OUTPATIENT)
Dept: OTHER | Facility: MEDICAL CENTER | Age: 51
End: 2021-08-24

## 2021-08-24 VITALS — HEART RATE: 87 BPM | RESPIRATION RATE: 21 BRPM | OXYGEN SATURATION: 96 %

## 2021-08-24 NOTE — TELEPHONE ENCOUNTER
Patient's O2 saturations decreased to 72%.  Before I was able to call her O2 saturations returned to lows 90s%.  Called to check on the patients.  The patient did not answer.  Left a message.

## 2021-08-24 NOTE — TELEPHONE ENCOUNTER
Talked to patient.  Patient stated she is doing good.  She is up moving around and cooking dinner.

## 2021-08-25 ENCOUNTER — TELEPHONE (OUTPATIENT)
Dept: OTHER | Facility: MEDICAL CENTER | Age: 51
End: 2021-08-25

## 2021-08-25 NOTE — PROGRESS NOTES
POOJAW Alonso will discharge patient from [facility] services due to lack of contact after x3 TC attempts 08/25/21. Multiple attempts made to contact pt and family with no response.

## 2021-08-29 NOTE — DOCUMENTATION QUERY
[facility]                                                                       Query Response Note      PATIENT:               VANNESA RUCKER  ACCT #:                  [id number]  MRN:                     [id number]  :                      1970  ADMIT DATE:       2021 4:55 PM  DISCH DATE:        2021 12:14 PM  RESPONDING  PROVIDER #:        [id number]           QUERY TEXT:    As documented in the medical record +SIRS criteria are met upon admission to hospital. Further documentation identifies Covid-19 PNA infection present. Further clarification is needed.    NOTE:  If an appropriate response is not listed below, please respond with a new note.    The patient's Clinical Indicators include:  Clinical indicators-  Per H&P: PNA due to COVID-19 virus. SOB, nonproductive cough and arthralgia. Acute respiratory failure.  +2/4 SIRS on admission: P   R 11-31     Treatments-  CBC; CXR; O2 therapy; Judicious use of IVF    Risks-  +SIRS on admission (HR >90, RR >20); Covid-19 PNA; Acute respiratory failure; CHRISTOPHER    Thank you,  Marika Figueroa CDI RN  Connect via Voalte  Options provided:   -- Sepsis, source Covid-19 PNA, with acute organ dysfunction (acute respiratory failure - severe sepsis) is ruled in, POA   -- Sepsis, source Covid-19 PNA, is ruled in, POA   -- SIRS of non-infectious origin with acute organ dysfunction (acute respiratory failure) is ruled in, POA   -- SIRS of non-infectious origin is ruled in, POA   -- Sepsis/SIRS is ruled out   -- Unable to determine      Query created by: Marika Figueroa on 2021 4:03 PM    RESPONSE TEXT:    Sepsis, source Covid-19 PNA, with acute organ dysfunction (acute respiratory failure - severe sepsis) is ruled in, POA          Electronically signed by:  CHELE SUMMERS MD 2021 8:54 PM

## 2021-08-31 ENCOUNTER — TELEPHONE (OUTPATIENT)
Dept: OTHER | Facility: MEDICAL CENTER | Age: 51
End: 2021-08-31

## 2021-09-01 ENCOUNTER — TELEPHONE (OUTPATIENT)
Dept: OTHER | Facility: MEDICAL CENTER | Age: 51
End: 2021-09-01

## 2021-09-01 VITALS — OXYGEN SATURATION: 95 %

## 2021-09-01 NOTE — TELEPHONE ENCOUNTER
Spoke to Gina to let her know that she is disconnected. Her wrist band is blinking purple. Asked her to please change out her battery. She mentioned that Radius T is blinking green, and she doesn't have the choice to reconnect through her blue tooth. Explained that she can remove the Radius T and take her temperature manually as needed.    Also let her know that she should expect a phone call in the morning from Dr. Rowley for there telephone visit.

## 2021-09-01 NOTE — TELEPHONE ENCOUNTER
Informed Dr. Rowley that patient still is waiting for the tech to come to her house to fix the wifi. We can do check in calls every 8 hours x 2 calls then switch over to every 12 hour calls until she is able to get her wifi working.

## 2021-09-01 NOTE — TELEPHONE ENCOUNTER
Q4 well check. Gina stated she was fine. She took a shower, and that she will wait for our call in four hours. Let her know if she needs anything to give us a call.

## 2021-09-02 ENCOUNTER — TELEPHONE (OUTPATIENT)
Dept: OTHER | Facility: MEDICAL CENTER | Age: 51
End: 2021-09-02

## 2021-09-02 VITALS — OXYGEN SATURATION: 90 %

## 2021-09-02 NOTE — TELEPHONE ENCOUNTER
Called patient and she advised she is okay and is at 90% and they are working on her WIFI as we speak and hopefully it should be up by later this afternoon

## 2021-09-03 ENCOUNTER — TELEPHONE (OUTPATIENT)
Dept: OTHER | Facility: MEDICAL CENTER | Age: 51
End: 2021-09-03

## 2021-09-03 NOTE — TELEPHONE ENCOUNTER
Patient has been disconnected for a few days, due to wifi going down. Dr. Rowley spoke to patient and has removed from the program and the patient will follow up in the pulmonary clinic.

## 2022-02-11 ENCOUNTER — HOSPITAL ENCOUNTER (OUTPATIENT)
Facility: MEDICAL CENTER | Age: 52
End: 2022-02-12
Attending: EMERGENCY MEDICINE | Admitting: INTERNAL MEDICINE
Payer: MEDICARE

## 2022-02-11 DIAGNOSIS — R07.9 CHEST PAIN, UNSPECIFIED TYPE: ICD-10-CM

## 2022-02-11 LAB
ALBUMIN SERPL BCP-MCNC: 4.4 G/DL (ref 3.2–4.9)
ALBUMIN/GLOB SERPL: 1.6 G/DL
ALP SERPL-CCNC: 86 U/L (ref 30–99)
ALT SERPL-CCNC: 16 U/L (ref 2–50)
ANION GAP SERPL CALC-SCNC: 11 MMOL/L (ref 7–16)
AST SERPL-CCNC: 16 U/L (ref 12–45)
BASOPHILS # BLD AUTO: 0.4 % (ref 0–1.8)
BASOPHILS # BLD: 0.03 K/UL (ref 0–0.12)
BILIRUB SERPL-MCNC: 0.2 MG/DL (ref 0.1–1.5)
BUN SERPL-MCNC: 11 MG/DL (ref 8–22)
CALCIUM SERPL-MCNC: 9.5 MG/DL (ref 8.5–10.5)
CHLORIDE SERPL-SCNC: 107 MMOL/L (ref 96–112)
CO2 SERPL-SCNC: 23 MMOL/L (ref 20–33)
CREAT SERPL-MCNC: 0.53 MG/DL (ref 0.5–1.4)
EOSINOPHIL # BLD AUTO: 0.46 K/UL (ref 0–0.51)
EOSINOPHIL NFR BLD: 6.2 % (ref 0–6.9)
ERYTHROCYTE [DISTWIDTH] IN BLOOD BY AUTOMATED COUNT: 39.7 FL (ref 35.9–50)
GLOBULIN SER CALC-MCNC: 2.8 G/DL (ref 1.9–3.5)
GLUCOSE SERPL-MCNC: 109 MG/DL (ref 65–99)
HCT VFR BLD AUTO: 42.3 % (ref 37–47)
HGB BLD-MCNC: 14.6 G/DL (ref 12–16)
IMM GRANULOCYTES # BLD AUTO: 0.04 K/UL (ref 0–0.11)
IMM GRANULOCYTES NFR BLD AUTO: 0.5 % (ref 0–0.9)
LYMPHOCYTES # BLD AUTO: 2.92 K/UL (ref 1–4.8)
LYMPHOCYTES NFR BLD: 39.1 % (ref 22–41)
MCH RBC QN AUTO: 30.5 PG (ref 27–33)
MCHC RBC AUTO-ENTMCNC: 34.5 G/DL (ref 33.6–35)
MCV RBC AUTO: 88.3 FL (ref 81.4–97.8)
MONOCYTES # BLD AUTO: 0.54 K/UL (ref 0–0.85)
MONOCYTES NFR BLD AUTO: 7.2 % (ref 0–13.4)
NEUTROPHILS # BLD AUTO: 3.48 K/UL (ref 2–7.15)
NEUTROPHILS NFR BLD: 46.6 % (ref 44–72)
NRBC # BLD AUTO: 0 K/UL
NRBC BLD-RTO: 0 /100 WBC
PLATELET # BLD AUTO: 221 K/UL (ref 164–446)
PMV BLD AUTO: 10.3 FL (ref 9–12.9)
POTASSIUM SERPL-SCNC: 3.8 MMOL/L (ref 3.6–5.5)
PROT SERPL-MCNC: 7.2 G/DL (ref 6–8.2)
RBC # BLD AUTO: 4.79 M/UL (ref 4.2–5.4)
SODIUM SERPL-SCNC: 141 MMOL/L (ref 135–145)
TROPONIN T SERPL-MCNC: <6 NG/L (ref 6–19)
WBC # BLD AUTO: 7.5 K/UL (ref 4.8–10.8)

## 2022-02-11 PROCEDURE — 80053 COMPREHEN METABOLIC PANEL: CPT

## 2022-02-11 PROCEDURE — 85025 COMPLETE CBC W/AUTO DIFF WBC: CPT

## 2022-02-11 PROCEDURE — 93005 ELECTROCARDIOGRAM TRACING: CPT | Performed by: EMERGENCY MEDICINE

## 2022-02-11 PROCEDURE — 93005 ELECTROCARDIOGRAM TRACING: CPT

## 2022-02-11 PROCEDURE — 84484 ASSAY OF TROPONIN QUANT: CPT

## 2022-02-11 PROCEDURE — 99285 EMERGENCY DEPT VISIT HI MDM: CPT

## 2022-02-11 ASSESSMENT — FIBROSIS 4 INDEX: FIB4 SCORE: 0.34

## 2022-02-12 ENCOUNTER — APPOINTMENT (OUTPATIENT)
Dept: RADIOLOGY | Facility: MEDICAL CENTER | Age: 52
End: 2022-02-12
Payer: MEDICARE

## 2022-02-12 ENCOUNTER — APPOINTMENT (OUTPATIENT)
Dept: RADIOLOGY | Facility: MEDICAL CENTER | Age: 52
End: 2022-02-12
Attending: EMERGENCY MEDICINE
Payer: MEDICARE

## 2022-02-12 ENCOUNTER — APPOINTMENT (OUTPATIENT)
Dept: RADIOLOGY | Facility: MEDICAL CENTER | Age: 52
End: 2022-02-12
Attending: INTERNAL MEDICINE
Payer: MEDICARE

## 2022-02-12 VITALS
RESPIRATION RATE: 18 BRPM | OXYGEN SATURATION: 97 % | DIASTOLIC BLOOD PRESSURE: 53 MMHG | BODY MASS INDEX: 34.12 KG/M2 | HEIGHT: 62 IN | TEMPERATURE: 97 F | SYSTOLIC BLOOD PRESSURE: 104 MMHG | HEART RATE: 66 BPM | WEIGHT: 185.41 LBS

## 2022-02-12 PROBLEM — R07.9 CHEST PAIN: Status: RESOLVED | Noted: 2022-02-12 | Resolved: 2022-02-12

## 2022-02-12 PROBLEM — R07.9 CHEST PAIN: Status: ACTIVE | Noted: 2022-02-12

## 2022-02-12 LAB
AMPHET UR QL SCN: NEGATIVE
BARBITURATES UR QL SCN: NEGATIVE
BENZODIAZ UR QL SCN: NEGATIVE
BZE UR QL SCN: NEGATIVE
CANNABINOIDS UR QL SCN: NEGATIVE
D DIMER PPP IA.FEU-MCNC: <0.27 UG/ML (FEU) (ref 0–0.5)
EKG IMPRESSION: NORMAL
LIPASE SERPL-CCNC: 49 U/L (ref 11–82)
MAGNESIUM SERPL-MCNC: 1.8 MG/DL (ref 1.5–2.5)
METHADONE UR QL SCN: NEGATIVE
OPIATES UR QL SCN: NEGATIVE
OXYCODONE UR QL SCN: POSITIVE
PCP UR QL SCN: NEGATIVE
PROPOXYPH UR QL SCN: NEGATIVE
TROPONIN T SERPL-MCNC: <6 NG/L (ref 6–19)

## 2022-02-12 PROCEDURE — A9502 TC99M TETROFOSMIN: HCPCS

## 2022-02-12 PROCEDURE — 83735 ASSAY OF MAGNESIUM: CPT

## 2022-02-12 PROCEDURE — 93005 ELECTROCARDIOGRAM TRACING: CPT | Performed by: INTERNAL MEDICINE

## 2022-02-12 PROCEDURE — 84484 ASSAY OF TROPONIN QUANT: CPT | Mod: 91

## 2022-02-12 PROCEDURE — A9270 NON-COVERED ITEM OR SERVICE: HCPCS | Performed by: INTERNAL MEDICINE

## 2022-02-12 PROCEDURE — 700111 HCHG RX REV CODE 636 W/ 250 OVERRIDE (IP): Performed by: INTERNAL MEDICINE

## 2022-02-12 PROCEDURE — 80307 DRUG TEST PRSMV CHEM ANLYZR: CPT

## 2022-02-12 PROCEDURE — 83690 ASSAY OF LIPASE: CPT

## 2022-02-12 PROCEDURE — 85379 FIBRIN DEGRADATION QUANT: CPT

## 2022-02-12 PROCEDURE — 78452 HT MUSCLE IMAGE SPECT MULT: CPT | Mod: 26 | Performed by: INTERNAL MEDICINE

## 2022-02-12 PROCEDURE — 71045 X-RAY EXAM CHEST 1 VIEW: CPT

## 2022-02-12 PROCEDURE — 700102 HCHG RX REV CODE 250 W/ 637 OVERRIDE(OP): Performed by: INTERNAL MEDICINE

## 2022-02-12 PROCEDURE — 93010 ELECTROCARDIOGRAM REPORT: CPT | Mod: 59 | Performed by: INTERNAL MEDICINE

## 2022-02-12 PROCEDURE — 93018 CV STRESS TEST I&R ONLY: CPT | Performed by: INTERNAL MEDICINE

## 2022-02-12 PROCEDURE — 99235 HOSP IP/OBS SAME DATE MOD 70: CPT | Performed by: INTERNAL MEDICINE

## 2022-02-12 PROCEDURE — 96372 THER/PROPH/DIAG INJ SC/IM: CPT | Mod: XU

## 2022-02-12 PROCEDURE — G0378 HOSPITAL OBSERVATION PER HR: HCPCS

## 2022-02-12 PROCEDURE — 700111 HCHG RX REV CODE 636 W/ 250 OVERRIDE (IP)

## 2022-02-12 PROCEDURE — 36415 COLL VENOUS BLD VENIPUNCTURE: CPT

## 2022-02-12 RX ORDER — LEVOTHYROXINE SODIUM 112 UG/1
112 TABLET ORAL
Status: DISCONTINUED | OUTPATIENT
Start: 2022-02-12 | End: 2022-02-12 | Stop reason: HOSPADM

## 2022-02-12 RX ORDER — ACETAMINOPHEN 325 MG/1
650 TABLET ORAL EVERY 6 HOURS PRN
Status: DISCONTINUED | OUTPATIENT
Start: 2022-02-12 | End: 2022-02-12 | Stop reason: HOSPADM

## 2022-02-12 RX ORDER — HYDROMORPHONE HYDROCHLORIDE 1 MG/ML
0.25 INJECTION, SOLUTION INTRAMUSCULAR; INTRAVENOUS; SUBCUTANEOUS
Status: DISCONTINUED | OUTPATIENT
Start: 2022-02-12 | End: 2022-02-12 | Stop reason: HOSPADM

## 2022-02-12 RX ORDER — BISACODYL 10 MG
10 SUPPOSITORY, RECTAL RECTAL
Status: DISCONTINUED | OUTPATIENT
Start: 2022-02-12 | End: 2022-02-12 | Stop reason: HOSPADM

## 2022-02-12 RX ORDER — ALUMINA, MAGNESIA, AND SIMETHICONE 2400; 2400; 240 MG/30ML; MG/30ML; MG/30ML
30 SUSPENSION ORAL EVERY 4 HOURS PRN
Status: DISCONTINUED | OUTPATIENT
Start: 2022-02-12 | End: 2022-02-12 | Stop reason: HOSPADM

## 2022-02-12 RX ORDER — OXYCODONE HYDROCHLORIDE 5 MG/1
2.5 TABLET ORAL
Status: DISCONTINUED | OUTPATIENT
Start: 2022-02-12 | End: 2022-02-12 | Stop reason: HOSPADM

## 2022-02-12 RX ORDER — ASPIRIN 300 MG/1
300 SUPPOSITORY RECTAL DAILY
Status: DISCONTINUED | OUTPATIENT
Start: 2022-02-12 | End: 2022-02-12 | Stop reason: HOSPADM

## 2022-02-12 RX ORDER — ASPIRIN 325 MG
325 TABLET ORAL DAILY
Status: DISCONTINUED | OUTPATIENT
Start: 2022-02-12 | End: 2022-02-12 | Stop reason: HOSPADM

## 2022-02-12 RX ORDER — AMOXICILLIN 250 MG
2 CAPSULE ORAL 2 TIMES DAILY
Status: DISCONTINUED | OUTPATIENT
Start: 2022-02-12 | End: 2022-02-12 | Stop reason: HOSPADM

## 2022-02-12 RX ORDER — FAMOTIDINE 20 MG/1
20 TABLET, FILM COATED ORAL 2 TIMES DAILY
Qty: 60 TABLET | Refills: 3 | Status: SHIPPED | OUTPATIENT
Start: 2022-02-12 | End: 2022-02-26

## 2022-02-12 RX ORDER — AMINOPHYLLINE 25 MG/ML
100 INJECTION, SOLUTION INTRAVENOUS
Status: DISCONTINUED | OUTPATIENT
Start: 2022-02-12 | End: 2022-02-12 | Stop reason: HOSPADM

## 2022-02-12 RX ORDER — POLYETHYLENE GLYCOL 3350 17 G/17G
1 POWDER, FOR SOLUTION ORAL
Status: DISCONTINUED | OUTPATIENT
Start: 2022-02-12 | End: 2022-02-12 | Stop reason: HOSPADM

## 2022-02-12 RX ORDER — ALBUTEROL SULFATE 90 UG/1
2 AEROSOL, METERED RESPIRATORY (INHALATION)
Status: DISCONTINUED | OUTPATIENT
Start: 2022-02-12 | End: 2022-02-12 | Stop reason: HOSPADM

## 2022-02-12 RX ORDER — IBUPROFEN 800 MG/1
800 TABLET ORAL EVERY 8 HOURS PRN
COMMUNITY

## 2022-02-12 RX ORDER — ASPIRIN 81 MG/1
324 TABLET, CHEWABLE ORAL DAILY
Status: DISCONTINUED | OUTPATIENT
Start: 2022-02-12 | End: 2022-02-12 | Stop reason: HOSPADM

## 2022-02-12 RX ORDER — REGADENOSON 0.08 MG/ML
0.4 INJECTION, SOLUTION INTRAVENOUS
Status: COMPLETED | OUTPATIENT
Start: 2022-02-12 | End: 2022-02-12

## 2022-02-12 RX ORDER — REGADENOSON 0.08 MG/ML
INJECTION, SOLUTION INTRAVENOUS
Status: COMPLETED
Start: 2022-02-12 | End: 2022-02-12

## 2022-02-12 RX ORDER — OXYCODONE HYDROCHLORIDE 5 MG/1
5 TABLET ORAL
Status: DISCONTINUED | OUTPATIENT
Start: 2022-02-12 | End: 2022-02-12 | Stop reason: HOSPADM

## 2022-02-12 RX ADMIN — ACETAMINOPHEN 650 MG: 325 TABLET, FILM COATED ORAL at 11:32

## 2022-02-12 RX ADMIN — ACETAMINOPHEN 650 MG: 325 TABLET, FILM COATED ORAL at 05:28

## 2022-02-12 RX ADMIN — ASPIRIN 325 MG ORAL TABLET 325 MG: 325 PILL ORAL at 05:28

## 2022-02-12 RX ADMIN — REGADENOSON 0.4 MG: 0.08 INJECTION, SOLUTION INTRAVENOUS at 10:07

## 2022-02-12 RX ADMIN — OXYCODONE 5 MG: 5 TABLET ORAL at 03:39

## 2022-02-12 RX ADMIN — DOCUSATE SODIUM 50 MG AND SENNOSIDES 8.6 MG 2 TABLET: 8.6; 5 TABLET, FILM COATED ORAL at 05:29

## 2022-02-12 RX ADMIN — ENOXAPARIN SODIUM 40 MG: 40 INJECTION SUBCUTANEOUS at 05:29

## 2022-02-12 RX ADMIN — LEVOTHYROXINE SODIUM 112 MCG: 0.11 TABLET ORAL at 05:28

## 2022-02-12 ASSESSMENT — ENCOUNTER SYMPTOMS
INSOMNIA: 0
MYALGIAS: 0
BLURRED VISION: 0
HEADACHES: 0
NECK PAIN: 0
WEIGHT LOSS: 0
FEVER: 0
HEMOPTYSIS: 0
DIZZINESS: 0
DOUBLE VISION: 0
STRIDOR: 0
COUGH: 0
BRUISES/BLEEDS EASILY: 0
PALPITATIONS: 0
SORE THROAT: 0
NAUSEA: 0
DEPRESSION: 0
VOMITING: 0

## 2022-02-12 ASSESSMENT — LIFESTYLE VARIABLES
CONSUMPTION TOTAL: NEGATIVE
HAVE PEOPLE ANNOYED YOU BY CRITICIZING YOUR DRINKING: NO
EVER FELT BAD OR GUILTY ABOUT YOUR DRINKING: NO
TOTAL SCORE: 0
DOES PATIENT WANT TO STOP DRINKING: NO
AVERAGE NUMBER OF DAYS PER WEEK YOU HAVE A DRINK CONTAINING ALCOHOL: 0
HAVE YOU EVER FELT YOU SHOULD CUT DOWN ON YOUR DRINKING: NO
EVER HAD A DRINK FIRST THING IN THE MORNING TO STEADY YOUR NERVES TO GET RID OF A HANGOVER: NO
HOW MANY TIMES IN THE PAST YEAR HAVE YOU HAD 5 OR MORE DRINKS IN A DAY: 0
TOTAL SCORE: 0
DO YOU DRINK ALCOHOL: NO
TOTAL SCORE: 0
ON A TYPICAL DAY WHEN YOU DRINK ALCOHOL HOW MANY DRINKS DO YOU HAVE: 0

## 2022-02-12 ASSESSMENT — PAIN DESCRIPTION - PAIN TYPE: TYPE: ACUTE PAIN

## 2022-02-12 ASSESSMENT — PATIENT HEALTH QUESTIONNAIRE - PHQ9
2. FEELING DOWN, DEPRESSED, IRRITABLE, OR HOPELESS: NOT AT ALL
SUM OF ALL RESPONSES TO PHQ9 QUESTIONS 1 AND 2: 0
1. LITTLE INTEREST OR PLEASURE IN DOING THINGS: NOT AT ALL

## 2022-02-12 ASSESSMENT — FIBROSIS 4 INDEX: FIB4 SCORE: 0.89

## 2022-02-12 NOTE — ED PROVIDER NOTES
ED Provider Note    ED Provider Note    Scribed for Socorro Hamilton MD by Socorro Hamilton M.D.. 2/12/2022, 12:55 AM.    Primary care provider: Pcp Pt States None  Means of arrival: Private  History obtained from: Patient  History limited by: None    CHIEF COMPLAINT  Chief Complaint   Patient presents with   • Chest Pain     C/o of  dull chest pain located left side of chest with radiation up left jaw 10/10 onset this 10 AM while sewing. Pt endorses SOB, N/V with CP.  Pt recieved NTG, ASA PTA by EMS. Pt reports reduced pain with NTG given by EMS. Pmh: Angina.         HPI  Gina Scott is a 51 y.o. female who presents to the Emergency Department for evaluation of chest discomfort.  Patient notes symptoms started at 10 AM yesterday morning.  She notes pain waxing and waning, not exertional, localized to left chest with radiation to left side of her jaw.  Dull in character, occasionally severe.  She notes it started when she was sewing, not affected by exertion or rest.  She relates associated nausea, dyspnea, and dizziness.  She had no syncopal episode and has not vomited.  No fever.  Patient notes she does have a diagnosis of angina but as far she is aware she has never had an MI and has had no stenting, last echocardiogram intermittently is more than 2 years ago.  She relates she follows with Dr. Steinberg.  Patient evaluated by EMS and treated with aspirin and nitroglycerin.  Pain resolved at this time after nitroglycerin but she has developed a mild bitemporal headache.  No swelling in the legs, no pleuritic component.    REVIEW OF SYSTEMS  Pertinent positives include nonexertional left-sided chest pain for the last 14 hours with associated dyspnea. Pertinent negatives include no vomiting, no exertional component, no syncope, no hemoptysis, no pleuritic component.  All other systems reviewed and negative.    PAST MEDICAL HISTORY   has a past medical history of Asthma, Bronchitis,  Chickenpox, Chronic back pain (2009), ENDOMETRIOSIS (2009), Grave's disease (2009), Hypothyroid (2009), Hypothyroid, Hypothyroidism, Influenza, Kidney stones, Mild intermittent asthma without complication (2017), Osteoporosis, PND (post-nasal drip), Seizure disorder (HCC), Sleep apnea, Spinal cord stimulator status, Tonsillitis, Venereal disease, and Vitamin d deficiency (2009).    SURGICAL HISTORY   has a past surgical history that includes other orthopedic surgery; abdominal hysterectomy total; appendectomy; laminotomy; cholecystectomy; and laparotomy.    SOCIAL HISTORY  Social History     Tobacco Use   • Smoking status: Former Smoker     Packs/day: 0.15     Years: 22.00     Pack years: 3.30     Types: Cigarettes     Quit date: 2014     Years since quittin.5   • Smokeless tobacco: Never Used   • Tobacco comment: smoked pack and half a week in 2009   Vaping Use   • Vaping Use: Never used   Substance Use Topics   • Alcohol use: No     Alcohol/week: 0.0 oz   • Drug use: No      Social History     Substance and Sexual Activity   Drug Use No       FAMILY HISTORY  Family History   Problem Relation Age of Onset   • Hypertension Mother    • Diabetes Mother    • Hypertension Father    • Diabetes Father    • Cancer Father    • Cancer Maternal Aunt         aunt and cousin have breast cancer   • No Known Problems Sister    • No Known Problems Brother    • No Known Problems Son    • No Known Problems Son    • No Known Problems Son    • Genetic Disorder Neg Hx    • Lung Disease Neg Hx    • Sleep Apnea Neg Hx    Congestive heart failure in father    CURRENT MEDICATIONS  Home Medications     Reviewed by Christian Thorpe (Pharmacy Tech) on 22 at 0256  Med List Status: Complete   Medication Last Dose Status   albuterol (VENTOLIN OR PROVENTIL) 108 (90 BASE) MCG/ACT AERS inhalation aerosol 2022 Active   aspirin EC (ECOTRIN) 81 MG Tablet Delayed Response 2022  "Active   ibuprofen (MOTRIN) 200 MG Tab PRN Active   levothyroxine (SYNTHROID) 112 MCG Tab 2/11/2022 Active                ALLERGIES  Allergies   Allergen Reactions   • Darvocet [Propoxyphene N-Apap] Anaphylaxis   • [place] Swelling     Mouth swells up     • Hctz [Hydrochlorothiazide] Shortness of Breath   • Pcn [Penicillins] Hives and Shortness of Breath       PHYSICAL EXAM  VITAL SIGNS: /62   Pulse 74   Temp 36.5 °C (97.7 °F) (Temporal)   Resp 14   Ht 1.575 m (5' 2\")   Wt 83 kg (183 lb)   LMP 01/01/1998   SpO2 91%   BMI 33.47 kg/m²     General: Alert, no acute distress  Skin: Warm, dry, normal for ethnicity  Head: Normocephalic, atraumatic  Neck: Trachea midline, no tenderness, no JVD  Eye: PERRL, normal conjunctiva  ENMT: Oral mucosa moist, no pharyngeal erythema or exudate  Cardiovascular: Regular rate and rhythm, No murmur, Normal peripheral perfusion.  No peripheral edema.  Respiratory: Lungs CTA, respirations are non-labored, breath sounds are equal  Gastrointestinal: Soft, nontender, non distended  Musculoskeletal: No swelling, no deformity.  No tenderness to the chest.  Neurological: Alert and oriented to person, place, time, and situation  Lymphatics: No lymphadenopathy  Psychiatric: Cooperative, appropriate mood & affect      DIAGNOSTIC STUDIES/PROCEDURES    LABS  Results for orders placed or performed during the hospital encounter of 02/11/22   CBC with Differential   Result Value Ref Range    WBC 7.5 4.8 - 10.8 K/uL    RBC 4.79 4.20 - 5.40 M/uL    Hemoglobin 14.6 12.0 - 16.0 g/dL    Hematocrit 42.3 37.0 - 47.0 %    MCV 88.3 81.4 - 97.8 fL    MCH 30.5 27.0 - 33.0 pg    MCHC 34.5 33.6 - 35.0 g/dL    RDW 39.7 35.9 - 50.0 fL    Platelet Count 221 164 - 446 K/uL    MPV 10.3 9.0 - 12.9 fL    Neutrophils-Polys 46.60 44.00 - 72.00 %    Lymphocytes 39.10 22.00 - 41.00 %    Monocytes 7.20 0.00 - 13.40 %    Eosinophils 6.20 0.00 - 6.90 %    Basophils 0.40 0.00 - 1.80 %    Immature Granulocytes 0.50 " 0.00 - 0.90 %    Nucleated RBC 0.00 /100 WBC    Neutrophils (Absolute) 3.48 2.00 - 7.15 K/uL    Lymphs (Absolute) 2.92 1.00 - 4.80 K/uL    Monos (Absolute) 0.54 0.00 - 0.85 K/uL    Eos (Absolute) 0.46 0.00 - 0.51 K/uL    Baso (Absolute) 0.03 0.00 - 0.12 K/uL    Immature Granulocytes (abs) 0.04 0.00 - 0.11 K/uL    NRBC (Absolute) 0.00 K/uL   Complete Metabolic Panel (CMP)   Result Value Ref Range    Sodium 141 135 - 145 mmol/L    Potassium 3.8 3.6 - 5.5 mmol/L    Chloride 107 96 - 112 mmol/L    Co2 23 20 - 33 mmol/L    Anion Gap 11.0 7.0 - 16.0    Glucose 109 (H) 65 - 99 mg/dL    Bun 11 8 - 22 mg/dL    Creatinine 0.53 0.50 - 1.40 mg/dL    Calcium 9.5 8.5 - 10.5 mg/dL    AST(SGOT) 16 12 - 45 U/L    ALT(SGPT) 16 2 - 50 U/L    Alkaline Phosphatase 86 30 - 99 U/L    Total Bilirubin 0.2 0.1 - 1.5 mg/dL    Albumin 4.4 3.2 - 4.9 g/dL    Total Protein 7.2 6.0 - 8.2 g/dL    Globulin 2.8 1.9 - 3.5 g/dL    A-G Ratio 1.6 g/dL   Troponin   Result Value Ref Range    Troponin T <6 6 - 19 ng/L   ESTIMATED GFR   Result Value Ref Range    GFR If African American >60 >60 mL/min/1.73 m 2    GFR If Non African American >60 >60 mL/min/1.73 m 2   TROPONIN   Result Value Ref Range    Troponin T <6 6 - 19 ng/L   LIPASE   Result Value Ref Range    Lipase 49 11 - 82 U/L   Troponin in two (2) hours   Result Value Ref Range    Troponin T <6 6 - 19 ng/L   Magnesium   Result Value Ref Range    Magnesium 1.8 1.5 - 2.5 mg/dL   D-Dimer   Result Value Ref Range    D-Dimer Screen <0.27 0.00 - 0.50 ug/mL (FEU)   EKG   Result Value Ref Range    Report       [facility] Emergency Dept.    Test Date:  2022  Pt Name:    VANNESA RUCKER           Department: ER  MRN:        [id number]                      Room:  Gender:     Female                       Technician: [id number]  :        1970                   Requested By:ER TRIAGE PROTOCOL  Order #:    [id number]                    [place] MD: FAIZA HERNANDEZ  MD FLEX    Measurements  Intervals                                Axis  Rate:       72                           P:          37  LA:         148                          QRS:        68  QRSD:       78                           T:          30  QT:         380  QTc:        416    Interpretive Statements  SINUS RHYTHM  CONSIDER RVH OR POSTERIOR INFARCT  Compared to ECG 2017 23:59:24  Myocardial infarct finding now present  T-wave abnormality no longer present  Electronically Signed On 2022 0:21:17 PST by FAIZA MCCORD MD     EKG   Result Value Ref Range    Report       [facility] Emergency Dept.    Test Date:  2022  Pt Name:    VANNESA RUCKER           Department: ER  MRN:        [id number]                      Room:  Gender:     Female                       Technician: [id number]  :        1970                   Requested By:ER TRIAGE PROTOCOL  Order #:    [id number]                    Reading MD: FAIZA MCCORD MD    Measurements  Intervals                                Axis  Rate:       74                           P:          39  LA:         140                          QRS:        64  QRSD:       82                           T:          35  QT:         376  QTc:        418    Interpretive Statements  SINUS RHYTHM  EARLY PRECORDIAL R/S TRANSITION  BORDERLINE T ABNORMALITIES, ANTERIOR LEADS  Compared to ECG 2022 21:20:53  T-wave abnormality now present  Myocardial infarct finding no longer present  Electronically Signed On 2022 0:21:15 PST by FAIZA MCCORD MD       All labs reviewed by me.    EKG  12 Lead EKG obtained at 0050 and interpreted by me to show:  Rhythm: Normal sinus rhythm   Rate: 74  Axis: Normal  Intervals: Normal  Q Waves: Normal  No diagnostic ST segment elevation    Clinical Impression: Normal EKG  Compared to no significant change compared to 2022    RADIOLOGY  DX-CHEST-PORTABLE (1 VIEW)   Final Result  "        1.  No acute cardiopulmonary disease.      NM-CARDIAC STRESS TEST    (Results Pending)     The radiologist's interpretation of all radiological studies have been reviewed by me.    COURSE & MEDICAL DECISION MAKING  Pertinent Labs & Imaging studies reviewed. (See chart for details).  Reviewed previous documentation including unremarkable echocardiogram from 2017    12:55 AM - Patient seen and examined at bedside. Patient has already been treated with aspirin nitroglycerin with good effect. Ordered cardiac work-up including serial troponins in the ED to evaluate her symptoms. The differential diagnoses include but are not limited to: ACS, stable angina, GERD, muscle skeletal pain    0238: Patient reassessed, pain still present and currently moderate to severe at this time.  I have a bit her with thankfully benign work-up given persistent pain in my concerns delineated in the medical decision making I do feel she benefit from inpatient management.  Spoke with hospitalist encouraged accepts the patient to his service, she will likely have a stress test in the morning.    Patient Vitals for the past 24 hrs:   BP Temp Temp src Pulse Resp SpO2 Height Weight   02/12/22 0259 130/62 -- -- 74 -- 91 % -- --   02/12/22 0111 -- -- -- 86 -- 93 % -- --   02/12/22 0056 142/70 -- -- 86 -- 92 % -- --   02/11/22 2314 125/62 36.5 °C (97.7 °F) Temporal 68 14 92 % -- --   02/11/22 2120 115/70 36.9 °C (98.5 °F) Temporal 81 17 94 % -- --   02/11/22 2113 -- -- -- -- -- -- 1.575 m (5' 2\") 83 kg (183 lb)         Decision Making:  This is a 51 y.o. year old female who presents with nonexertional chest discomfort with radiation to the jaw with associated dyspnea.  She relates history of angina, she is never had cardiac catheterization or any stents placed however.  The family history of vascular disease.  She notes constant pain throughout the day, waxing and waning without exertion.  Feeling better after nitroglycerin but pain is still " present throughout the patient evaluation in the ED.  Heart score is 5, moderate risk ratification, I am concerned for ACS, already treated with aspirin and nitro, plan is for inpatient management, likely stress in the morning.    Patient admitted in improved but guarded condition to the care of the hospitalist Dr. Stephens to medical telemetry observation    FINAL IMPRESSION  1. Chest pain, unspecified type          I, Socorro Hamilton M.D. (Scribe), am scribing for, and in the presence of, Socorro Hamilton MD.    Electronically signed by: Socorro Hamilton M.D. (Scribe), 2/12/2022    I, Socorro Hamilton MD personally performed the services described in this documentation, as scribed by Socorro Hamilton M.D. in my presence, and it is both accurate and complete    The note accurately reflects work and decisions made by me.  Socorro Hamilton M.D.  2/12/2022  4:15 AM

## 2022-02-12 NOTE — H&P
Hospital Medicine History & Physical Note    Date of Service  2/12/2022    Primary Care Physician  Pcp Pt States None      Code Status  Full Code    Chief Complaint  Chief Complaint   Patient presents with   • Chest Pain     C/o of  dull chest pain located left side of chest with radiation up left jaw 10/10 onset this 10 AM while sewing. Pt endorses SOB, N/V with CP.  Pt recieved NTG, ASA PTA by EMS. Pt reports reduced pain with NTG given by EMS. Pmh: Angina.         History of Presenting Illness  Gina Scott is a 51 y.o. female with history of hypothyroidism who presented 2/11/2022 with 10 hours of intermittent dull left-sided chest pain associated with nausea vomiting diaphoresis occurring at rest relieved by nitroglycerin, exacerbated by exertion.  She denies recent change in medication regiment she is otherwise felt well.  She denies recent cardiac stress test.  Initial work-up is unremarkable, she is referred to the hospitalist for admission        I discussed the plan of care with patient.    Review of Systems  Review of Systems   Constitutional: Negative for fever, malaise/fatigue and weight loss.   HENT: Negative for sore throat and tinnitus.    Eyes: Negative for blurred vision and double vision.   Respiratory: Negative for cough, hemoptysis and stridor.    Cardiovascular: Negative for chest pain and palpitations.   Gastrointestinal: Negative for nausea and vomiting.   Genitourinary: Negative for dysuria and urgency.   Musculoskeletal: Negative for myalgias and neck pain.   Skin: Negative for itching and rash.   Neurological: Negative for dizziness and headaches.   Endo/Heme/Allergies: Does not bruise/bleed easily.   Psychiatric/Behavioral: Negative for depression. The patient does not have insomnia.        Past Medical History   has a past medical history of Asthma, Bronchitis, Chickenpox, Chronic back pain (11/24/2009), ENDOMETRIOSIS (11/24/2009), Grave's disease (11/24/2009), Hypothyroid  (11/24/2009), Hypothyroid, Hypothyroidism, Influenza, Kidney stones, Mild intermittent asthma without complication (1/13/2017), Osteoporosis, PND (post-nasal drip), Seizure disorder (HCC), Sleep apnea, Spinal cord stimulator status, Tonsillitis, Venereal disease, and Vitamin d deficiency (11/24/2009).    Surgical History   has a past surgical history that includes other orthopedic surgery; abdominal hysterectomy total; appendectomy; laminotomy; cholecystectomy; and laparotomy.     Family History  family history includes Cancer in her father and maternal aunt; Diabetes in her father and mother; Hypertension in her father and mother; No Known Problems in her brother, sister, son, son, and son.   Family history reviewed with patient. There is family history that is pertinent to the chief complaint.     Social History   reports that she quit smoking about 7 years ago. Her smoking use included cigarettes. She has a 3.30 pack-year smoking history. She has never used smokeless tobacco. She reports that she does not drink alcohol and does not use drugs.    Allergies  Allergies   Allergen Reactions   • Darvocet [Propoxyphene N-Apap] Anaphylaxis   • [place] Swelling     Mouth swells up     • Hctz [Hydrochlorothiazide] Shortness of Breath   • Pcn [Penicillins] Hives and Shortness of Breath       Medications  Prior to Admission Medications   Prescriptions Last Dose Informant Patient Reported? Taking?   albuterol (VENTOLIN OR PROVENTIL) 108 (90 BASE) MCG/ACT AERS inhalation aerosol 2/12/2022 at 0000 Patient No No   Sig: Inhale 2 Puffs by mouth every 6 hours as needed for Shortness of Breath.   aspirin EC (ECOTRIN) 81 MG Tablet Delayed Response 2/11/2022 at AM Patient Yes Yes   Sig: Take 81 mg by mouth every day.   ibuprofen (MOTRIN) 200 MG Tab PRN at PRN Patient Yes Yes   Sig: Take 400 mg by mouth every 6 hours as needed for Mild Pain or Inflammation.   levothyroxine (SYNTHROID) 112 MCG Tab 2/11/2022 at AM Patient Yes No   Sig:  Take 112 mcg by mouth every morning on an empty stomach.      Facility-Administered Medications: None       Physical Exam  Temp:  [36.5 °C (97.7 °F)-36.9 °C (98.5 °F)] 36.5 °C (97.7 °F)  Pulse:  [68-86] 74  Resp:  [14-17] 14  BP: (115-142)/(62-70) 130/62  SpO2:  [91 %-94 %] 91 %  Blood Pressure: 130/62   Temperature: 36.5 °C (97.7 °F)   Pulse: 74   Respiration: 14   Pulse Oximetry: 91 %       Physical Exam  Vitals and nursing note reviewed.   Constitutional:       General: She is not in acute distress.     Appearance: Normal appearance. She is normal weight. She is not toxic-appearing.   HENT:      Head: Normocephalic and atraumatic.      Nose: Nose normal. No congestion or rhinorrhea.      Mouth/Throat:      Mouth: Mucous membranes are moist.      Pharynx: Oropharynx is clear.   Eyes:      Extraocular Movements: Extraocular movements intact.      Conjunctiva/sclera: Conjunctivae normal.      Pupils: Pupils are equal, round, and reactive to light.   Neck:      Vascular: No carotid bruit.   Cardiovascular:      Rate and Rhythm: Normal rate and regular rhythm.      Pulses: Normal pulses.      Heart sounds: Normal heart sounds. No murmur heard.  No gallop.    Pulmonary:      Effort: No respiratory distress.      Breath sounds: Normal breath sounds. No wheezing or rales.   Abdominal:      General: Abdomen is flat. Bowel sounds are normal. There is no distension.      Palpations: Abdomen is soft. There is no mass.      Tenderness: There is no abdominal tenderness.      Hernia: No hernia is present.   Musculoskeletal:         General: No tenderness or signs of injury.      Cervical back: Normal range of motion and neck supple. No muscular tenderness.   Lymphadenopathy:      Cervical: No cervical adenopathy.   Skin:     Capillary Refill: Capillary refill takes less than 2 seconds.      Coloration: Skin is not jaundiced or pale.      Findings: No bruising.   Neurological:      General: No focal deficit present.      Mental  Status: She is alert and oriented to person, place, and time. Mental status is at baseline.      Cranial Nerves: No cranial nerve deficit.      Motor: No weakness.      Coordination: Coordination normal.   Psychiatric:         Mood and Affect: Mood normal.         Thought Content: Thought content normal.         Judgment: Judgment normal.         Laboratory:  Recent Labs     02/11/22 2133   WBC 7.5   RBC 4.79   HEMOGLOBIN 14.6   HEMATOCRIT 42.3   MCV 88.3   MCH 30.5   MCHC 34.5   RDW 39.7   PLATELETCT 221   MPV 10.3     Recent Labs     02/11/22 2133   SODIUM 141   POTASSIUM 3.8   CHLORIDE 107   CO2 23   GLUCOSE 109*   BUN 11   CREATININE 0.53   CALCIUM 9.5     Recent Labs     02/11/22 2133 02/12/22 0124   ALTSGPT 16  --    ASTSGOT 16  --    ALKPHOSPHAT 86  --    TBILIRUBIN 0.2  --    LIPASE  --  49   GLUCOSE 109*  --          No results for input(s): NTPROBNP in the last 72 hours.      Recent Labs     02/11/22 2133 02/12/22 0124 02/12/22  0332   TROPONINT <6 <6 <6       Imaging:  DX-CHEST-PORTABLE (1 VIEW)   Final Result         1.  No acute cardiopulmonary disease.      NM-CARDIAC STRESS TEST    (Results Pending)       EKG:  I have personally reviewed the images and compared with prior images.    Assessment/Plan:  I anticipate this patient will require at least two midnights for appropriate medical management, necessitating inpatient admission.    * Chest pain- (present on admission)  Assessment & Plan  Atypical pain with risk factors present of hypothyroidism and family history.  Follow-up serial troponin, lipid profile and Cardiolite stress test.    Hypothyroidism  Assessment & Plan  Follow-up TSH        VTE prophylaxis: SCDs/TEDs

## 2022-02-12 NOTE — ED TRIAGE NOTES
Chief Complaint   Patient presents with   • Chest Pain     C/o of  dull chest pain located left side of chest with radiation up left jaw 10/10 onset this 10 AM while sewing. Pt endorses SOB, N/V with CP.  Pt recieved NTG, ASA PTA by EMS. Pt reports reduced pain with NTG given by EMS. Pmh: Angina.           Protocol Ordered.     Vitals:    02/11/22 2120   BP: 115/70   Pulse: 81   Resp: 17   Temp: 36.9 °C (98.5 °F)   SpO2: 94%

## 2022-02-12 NOTE — PROGRESS NOTES
Report received from NORM Sharif.  Patient sitting up in bed with her  at the bedside.  Patient A & O  X 4.  No apparent signs of distress.  Safety precautions in place.  Patient educated to call for assistance.  Hourly rounding in place.

## 2022-02-12 NOTE — ASSESSMENT & PLAN NOTE
Atypical pain with risk factors present of hypothyroidism and family history.  Follow-up serial troponin, lipid profile and Cardiolite stress test.

## 2022-02-12 NOTE — PROGRESS NOTES
4 Eyes Skin Assessment Completed by NORM Sharif and NORM Navarro.    Head WDL  Ears WDL   Nose WDL  Mouth WDL  Neck WDL  Breast/Chest WDL  Shoulder Blades WDL  Spine WDL  (R) Arm/Elbow/Hand WDL  (L) Arm/Elbow/Hand WDL  Abdomen WDL  Groin WDL  Scrotum/Coccyx/Buttocks WDL  (R) Leg WDL  (L) Leg WDL  (R) Heel/Foot/Toe WDL  (L) Heel/Foot/Toe WDL          Devices In Places Tele Box, Blood Pressure Cuff and Pulse Ox      Interventions In Place Pillows    Possible Skin Injury No    Pictures Uploaded Into Epic N/A  Wound Consult Placed N/A  RN Wound Prevention Protocol Ordered No

## 2022-02-12 NOTE — PROGRESS NOTES
Patient up to floor from ER via gurney on a cardiac monitor. I have assumed care of this patient. Patient ambulated from gurney in triplett to bed in room with steady gait and tolerated activity without incident.  Patient has been assessed (see flow sheet) and plan of care has been discussed. Patient verbalizes understanding of plan and denies any further needs at this time. Patient is now resting in bed, bed is in the lowest position and locked, and the call light is within reach.

## 2022-02-12 NOTE — ED TRIAGE NOTES
"Patient vital signs rechecked and documented per [facility]. Patient denies any new needs at this time.  Patient updated on wait times, thanked for patience. Pt informed to alert triage tech or triage RN with any needs and/or changes in condition; patient verbalized understanding. Patient stated \"I feel so much better, I just have a really bad headache after the nitro they gave me.\"   "

## 2022-02-12 NOTE — CARE PLAN
The patient is Stable - Low risk of patient condition declining or worsening    Shift Goals  Clinical Goals: stress test AM   Patient Goals: safety   Family Goals: safety    Progress made toward(s) clinical / shift goals:    Problem: Pain - Standard  Goal: Alleviation of pain or a reduction in pain to the patient’s comfort goal  Outcome: Progressing     Problem: Knowledge Deficit - Standard  Goal: Patient and family/care givers will demonstrate understanding of plan of care, disease process/condition, diagnostic tests and medications  Outcome: Progressing     Problem: Hemodynamics  Goal: Patient's hemodynamics, fluid balance and neurologic status will be stable or improve  Outcome: Progressing     Problem: Mobility  Goal: Patient's capacity to carry out activities will improve  Outcome: Progressing     Problem: Self Care  Goal: Patient will have the ability to perform ADLs independently or with assistance (bathe, groom, dress, toilet and feed)  Outcome: Progressing       Patient is not progressing towards the following goals:

## 2022-02-12 NOTE — DISCHARGE INSTRUCTIONS
Discharge Instructions    Discharged to home by car with relative. Discharged via wheelchair, hospital escort: Yes.  Special equipment needed: Not Applicable    Be sure to schedule a follow-up appointment with your primary care doctor or any specialists as instructed.     Discharge Plan:   Diet Plan: Discussed  Activity Level: Discussed  Confirmed Follow up Appointment: Patient to Call and Schedule Appointment  Confirmed Symptoms Management: Discussed  Medication Reconciliation Updated: Yes  Influenza Vaccine Indication: Not indicated: Previously immunized this influenza season and > 8 years of age    I understand that a diet low in cholesterol, fat, and sodium is recommended for good health. Unless I have been given specific instructions below for another diet, I accept this instruction as my diet prescription.   Other diet: Heart Healthy    Special Instructions: None    · Is patient discharged on Warfarin / Coumadin?   No     Depression / Suicide Risk    As you are discharged from this [facility], it is important to learn how to keep safe from harming yourself.    Recognize the warning signs:  · Abrupt changes in personality, positive or negative- including increase in energy   · Giving away possessions  · Change in eating patterns- significant weight changes-  positive or negative  · Change in sleeping patterns- unable to sleep or sleeping all the time   · Unwillingness or inability to communicate  · Depression  · Unusual sadness, discouragement and loneliness  · Talk of wanting to die  · Neglect of personal appearance   · Rebelliousness- reckless behavior  · Withdrawal from people/activities they love  · Confusion- inability to concentrate     If you or a loved one observes any of these behaviors or has concerns about self-harm, here's what you can do:  · Talk about it- your feelings and reasons for harming yourself  · Remove any means that you might use to hurt yourself (examples: pills, rope,  extension cords, firearm)  · Get professional help from the community (Mental Health, Substance Abuse, psychological counseling)  · Do not be alone:Call your Safe Contact- someone whom you trust who will be there for you.  · Call your local CRISIS HOTLINE 634-9274 or 661-265-2173  · Call your local Children's Mobile Crisis Response Team [place] (591) 136-5840 or www.StyleChat [employer] Mobile  · Call the toll free National Suicide Prevention Hotlines   · National Suicide Prevention Lifeline 517-326-IUYS (9742)  · National Hope Line Network 800-SUICIDE (114-7176)

## 2022-02-14 NOTE — DISCHARGE SUMMARY
Discharge Summary    CHIEF COMPLAINT ON ADMISSION  Chief Complaint   Patient presents with   • Chest Pain     C/o of  dull chest pain located left side of chest with radiation up left jaw 10/10 onset this 10 AM while sewing. Pt endorses SOB, N/V with CP.  Pt recieved NTG, ASA PTA by EMS. Pt reports reduced pain with NTG given by EMS. Pmh: Angina.         Reason for Admission  Chest pain     Admission Date  2/11/2022    CODE STATUS  Prior    HPI & HOSPITAL COURSE    As per dr martinez h+p    Gina Kimberli Scott is a 51 y.o. female with history of hypothyroidism who presented 2/11/2022 with 10 hours of intermittent dull left-sided chest pain associated with nausea vomiting diaphoresis occurring at rest relieved by nitroglycerin, exacerbated by exertion.  She denies recent change in medication regiment she is otherwise felt well.  She denies recent cardiac stress test.  Initial work-up is unremarkable, she is referred to the hospitalist for admission    The patient was monitored on telemetry there is no evidence of arrhythmia.  The patient's troponins were negative and patient went for a cardiac stress that did not show any evidence of reversible  ischemia.  Patient was cleared for discharge to follow-up with PCP for the care and management    Therefore, she is discharged in good and stable condition to home with close outpatient follow-up.    The patient recovered much more quickly than anticipated on admission.    Discharge Date  2/12/2022    FOLLOW UP ITEMS POST DISCHARGE  pcp as needed    DISCHARGE DIAGNOSES  Principal Problem (Resolved):    Chest pain POA: Yes  Active Problems:    Hypothyroidism POA: Unknown      Overview: Has not check TSH in long time takes 0.125 mg one a day      FOLLOW UP  No future appointments.  [facility], Emergency Dept  [address] [place] [id number] [phone]  Schedule an appointment as soon as possible for a visit in 1 week  As needed    Pcp Pt  States None            MEDICATIONS ON DISCHARGE     Medication List      START taking these medications      Instructions   famotidine 20 MG Tabs  Commonly known as: PEPCID   Take 1 Tablet by mouth 2 times a day.  Dose: 20 mg        CONTINUE taking these medications      Instructions   albuterol 108 (90 Base) MCG/ACT Aers inhalation aerosol   Inhale 2 Puffs by mouth every 6 hours as needed for Shortness of Breath.  Dose: 2 Puff     aspirin EC 81 MG Tbec  Commonly known as: ECOTRIN   Take 81 mg by mouth every day.  Dose: 81 mg     ibuprofen 200 MG Tabs  Commonly known as: MOTRIN   Take 400 mg by mouth every 6 hours as needed for Mild Pain or Inflammation.  Dose: 400 mg     levothyroxine 112 MCG Tabs  Commonly known as: SYNTHROID   Take 112 mcg by mouth every morning on an empty stomach.  Dose: 112 mcg            Allergies  Allergies   Allergen Reactions   • Darvocet [Propoxyphene N-Apap] Anaphylaxis   • [place] Swelling     Mouth swells up     • Hctz [Hydrochlorothiazide] Shortness of Breath   • Pcn [Penicillins] Hives and Shortness of Breath       DIET  No orders of the defined types were placed in this encounter.      ACTIVITY  As tolerated.  Weight bearing as tolerated    CONSULTATIONS      PROCEDURES  This study doesn't have any protocol information    Images     Show images for NM-CARDIAC STRESS TEST    NM-CARDIAC STRESS TEST  Order: [id number]   Status: Final result       Visible to patient: Yes (seen)       Next appt: None       0 Result Notes      Details    Reading Physician Reading Date Result Priority   No Reading Provider Prelim 2/12/2022    Lonnie Pierre M.D.  [phone] 2/12/2022      Narrative & Impression                           Myocardial Perfusion   Report   NUCLEAR IMAGING INTERPRETATION   No evidence of significant jeopardized viable myocardium or prior myocardial    infarction.   Normal left ventricular size, ejection fraction, and wall motion.   ECG INTERPRETATION   Non-diagnostic  EKG      VANNESA RUCKER      MRN:    [id number]         Gender:    F      Exam Date: 2022 06:35      Exam Location:      Inpatient      Ordering Phys:     CHELE SUMMERS Tech:       Courtney Saeed RT      Age:    51    :    1970        Ht (in):     62      Wt (lb):     185    BMI:    33.69       Radiologist      Risk Factors:             Family history of coronary disease      Indications:              Chest pain, unspecified      ICD Codes:                R07.9      Cardiac History:          Chest Pain, Palpitations, Dyspnea      Cardiac Meds:      Meds Past 24 hrs:      Pretest Chest Pain:       No symptoms      STRESS TEST      Pharmacologi                    c   Raquel   Lexiscan       Dose: 0.4 mg   ol:                                 Post-Injection Exercise:        No exercise followed the intravenous   injection                     Resting HR (bpm):      72      Peak HR (bpm):         113      Resting BP (mmHg):       118    /   77      Peak BP (mmHg):       119   /   77      MaxPHR:     171     Target HR (bpm):       145      % MaxPHR:     66      Double Product:       91657      BP Response:      Stress Termination:       Completion of Protocol      Stress Symptoms:   DYSPNEA,flushing skin,OCC PACs,OCC PVCs,NORMAL SINUS RHYTHM,no chest    pain,nausea      ECG      Resting ECG:     Sinus rhythm      Stress ECG:      No ischemic changes      IMAGE PROTOCOL      Rest/Stress 1                        Day              RadiopharmaceuticalDose (mCi)   Imaging  Date      Imaging  Time           Inj to Img Time (min)   Rest:   Tc-99m             7.2          2022        09:51                   30   Stress: Tc-99m             26.2         2022        10:34                   30      Rest:   Administration Site:       Right antecubital                               fossa   Administered by:      Courtney Saeed RT      Stress:   Administration Site:       Right antecubital                                fossa   Administered by:      Courtney Saeed RT      % Percent HR Achieved:   SPECT RESULTS      Technical Quality:       Good      Raw Data Analysis:   Summed Stress Score:    0   Summed Rest Score:    2   Summed Difference Score:        0   PERFUSION:   Normal myocardial perfusion with no ischemia.      FUNCTIONAL RESULTS (calculated via Gated SPECT)      Stress Image LV EF:        76     %      Upper Normal Limit      Stress EDV:      88     ml   EDVI:    47      ml/m2      Stress ESV:      21     ml   ESVI:    11      ml/m2      TID:    0.92   TID - 1.19      TID (ed) - 1.23   LV Function:   Normal left ventricular wall motion.  LV ejection fraction = 76%.                           Lonnie Pierre MD   (Electronically Signed)   Final Date:      12 February              LABORATORY  Lab Results   Component Value Date    SODIUM 141 02/11/2022    POTASSIUM 3.8 02/11/2022    CHLORIDE 107 02/11/2022    CO2 23 02/11/2022    GLUCOSE 109 (H) 02/11/2022    BUN 11 02/11/2022    CREATININE 0.53 02/11/2022    CREATININE 0.7 01/22/2006        Lab Results   Component Value Date    WBC 7.5 02/11/2022    HEMOGLOBIN 14.6 02/11/2022    HEMATOCRIT 42.3 02/11/2022    PLATELETCT 221 02/11/2022        Patient admitted and discharged on the same date of 2/12/2022.  Total time taken on this date is greater than 55 minutes

## 2022-02-26 ENCOUNTER — OFFICE VISIT (OUTPATIENT)
Dept: URGENT CARE | Facility: PHYSICIAN GROUP | Age: 52
End: 2022-02-26
Payer: MEDICARE

## 2022-02-26 VITALS
SYSTOLIC BLOOD PRESSURE: 122 MMHG | DIASTOLIC BLOOD PRESSURE: 62 MMHG | BODY MASS INDEX: 33.13 KG/M2 | TEMPERATURE: 96.6 F | HEART RATE: 74 BPM | RESPIRATION RATE: 20 BRPM | OXYGEN SATURATION: 97 % | WEIGHT: 180 LBS | HEIGHT: 62 IN

## 2022-02-26 DIAGNOSIS — M77.8 TENDONITIS OF ELBOW, LEFT: ICD-10-CM

## 2022-02-26 DIAGNOSIS — S46.912A MUSCLE STRAIN OF LEFT UPPER ARM, INITIAL ENCOUNTER: ICD-10-CM

## 2022-02-26 PROCEDURE — 99213 OFFICE O/P EST LOW 20 MIN: CPT | Performed by: NURSE PRACTITIONER

## 2022-02-26 RX ORDER — NITROGLYCERIN 0.4 MG/1
TABLET SUBLINGUAL
COMMUNITY
Start: 2022-02-14 | End: 2024-01-14

## 2022-02-26 ASSESSMENT — ENCOUNTER SYMPTOMS
MYALGIAS: 1
TINGLING: 0
SENSORY CHANGE: 0
FEVER: 0
WEAKNESS: 0
BRUISES/BLEEDS EASILY: 0
CHILLS: 0
FALLS: 0

## 2022-02-26 ASSESSMENT — FIBROSIS 4 INDEX: FIB4 SCORE: 0.89

## 2022-02-26 NOTE — PROGRESS NOTES
Subjective     Gina Scott is a 51 y.o. female who presents with Arm Pain (Left; Pt states from shoulder to elbow. 1x day /)            HPI  States was lifting child with left arm yesterday and felt pain at left elbow and shoulder. Left arm dominant. History of fracture in forearm. Ibuprofen 600 mg last night. Painful to flex elbow inward, shoulder discomfort with lifting up arm.     PMH:  has a past medical history of Asthma, Bronchitis, Chickenpox, Chronic back pain (11/24/2009), ENDOMETRIOSIS (11/24/2009), Grave's disease (11/24/2009), Hypothyroid (11/24/2009), Hypothyroid, Hypothyroidism, Influenza, Kidney stones, Mild intermittent asthma without complication (1/13/2017), Osteoporosis, PND (post-nasal drip), Seizure disorder ([facility]), Sleep apnea, Spinal cord stimulator status, Tonsillitis, Venereal disease, and Vitamin d deficiency (11/24/2009).    She has no past medical history of Breast cancer ([facility]) or Nasal drainage.  MEDS:   Current Outpatient Medications:   •  nitroglycerin (NITROSTAT) 0.4 MG SL Tab, , Disp: , Rfl:   •  aspirin EC (ECOTRIN) 81 MG Tablet Delayed Response, Take 81 mg by mouth every day., Disp: , Rfl:   •  ibuprofen (MOTRIN) 200 MG Tab, Take 400 mg by mouth every 6 hours as needed for Mild Pain or Inflammation., Disp: , Rfl:   •  levothyroxine (SYNTHROID) 112 MCG Tab, Take 112 mcg by mouth every morning on an empty stomach., Disp: , Rfl:   •  albuterol (VENTOLIN OR PROVENTIL) 108 (90 BASE) MCG/ACT AERS inhalation aerosol, Inhale 2 Puffs by mouth every 6 hours as needed for Shortness of Breath., Disp: 8.5 g, Rfl: 3  •  famotidine (PEPCID) 20 MG Tab, Take 1 Tablet by mouth 2 times a day. (Patient not taking: Reported on 2/26/2022), Disp: 60 Tablet, Rfl: 3  ALLERGIES:   Allergies   Allergen Reactions   • Darvocet [Propoxyphene N-Apap] Anaphylaxis   • [place] Swelling     Mouth swells up     • Hctz [Hydrochlorothiazide] Shortness of Breath   • Pcn [Penicillins] Hives and Shortness of  "Breath     SURGHX:   Past Surgical History:   Procedure Laterality Date   • ABDOMINAL HYSTERECTOMY TOTAL      ovarian  cyst endometriosis   • APPENDECTOMY     • CHOLECYSTECTOMY     • LAMINOTOMY      fusion L5   • LAPAROTOMY     • OTHER ORTHOPEDIC SURGERY      fusion     SOCHX:  reports that she quit smoking about 7 years ago. Her smoking use included cigarettes. She has a 3.30 pack-year smoking history. She has never used smokeless tobacco. She reports that she does not drink alcohol and does not use drugs.  FH: Family history was reviewed, no pertinent findings to report    Review of Systems   Constitutional: Negative for chills, fever and malaise/fatigue.   Musculoskeletal: Positive for joint pain and myalgias. Negative for falls.   Skin: Negative for itching and rash.   Neurological: Negative for tingling, sensory change and weakness.   Endo/Heme/Allergies: Does not bruise/bleed easily.   All other systems reviewed and are negative.             Objective     /62 (BP Location: Left arm, Patient Position: Sitting, BP Cuff Size: Adult)   Pulse 74   Temp 35.9 °C (96.6 °F) (Temporal)   Resp 20   Ht 1.575 m (5' 2\")   Wt 81.6 kg (180 lb)   LMP 01/01/1998   SpO2 97%   BMI 32.92 kg/m²      Physical Exam  Vitals reviewed.   Constitutional:       General: She is awake. She is not in acute distress.     Appearance: Normal appearance. She is well-developed. She is not ill-appearing, toxic-appearing or diaphoretic.   HENT:      Head: Normocephalic.   Eyes:      Pupils: Pupils are equal, round, and reactive to light.   Cardiovascular:      Rate and Rhythm: Normal rate.   Pulmonary:      Effort: Pulmonary effort is normal.   Musculoskeletal:      Left shoulder: Tenderness present. No swelling, deformity, effusion, laceration, bony tenderness or crepitus. Decreased range of motion. Decreased strength. Normal pulse.      Left elbow: No swelling, deformity, effusion or lacerations. Decreased range of motion. " Tenderness present in lateral epicondyle.        Arms:    Skin:     General: Skin is warm and dry.      Findings: No abrasion, bruising, ecchymosis, erythema, signs of injury, laceration, rash or wound.   Neurological:      Mental Status: She is alert and oriented to person, place, and time.   Psychiatric:         Attention and Perception: Attention normal.         Mood and Affect: Mood normal.         Speech: Speech normal.         Behavior: Behavior normal. Behavior is cooperative.                             Assessment & Plan        1. Muscle strain of left upper arm, initial encounter    - Referral to Sports Medicine    2. Tendonitis of elbow, left    - Referral to Sports Medicine     -Take over the counter NSAID as needed for discomfort  -May use cool compresses for any swelling /throbbing pain and warm compresses for muscle stiffness   -May perform gentle back muscle stretches as tolerated after warm compresses to maintain mobility, avoid heavy lifting or repetitive motions  -May apply topical analgesics as needed (preferred lidocaine based topical like Salon Pas)  -May use elbow sleeve as needed for arm support and stability with use  -Perform proper body mechanics with lift/carry, push/pull. Ask for assistance with heavy objects as needed   -Monitor for numbness/tingling in upper extremity, decreased range of kareem with lifting difficulty- need re-evaluation

## 2022-11-07 ENCOUNTER — PATIENT MESSAGE (OUTPATIENT)
Dept: HEALTH INFORMATION MANAGEMENT | Facility: OTHER | Age: 52
End: 2022-11-07

## 2023-02-19 ENCOUNTER — APPOINTMENT (OUTPATIENT)
Dept: URGENT CARE | Facility: PHYSICIAN GROUP | Age: 53
End: 2023-02-19
Payer: MEDICARE

## 2024-01-14 ENCOUNTER — HOSPITAL ENCOUNTER (OUTPATIENT)
Facility: MEDICAL CENTER | Age: 54
End: 2024-01-14
Attending: EMERGENCY MEDICINE | Admitting: STUDENT IN AN ORGANIZED HEALTH CARE EDUCATION/TRAINING PROGRAM
Payer: MEDICARE

## 2024-01-14 ENCOUNTER — APPOINTMENT (OUTPATIENT)
Dept: RADIOLOGY | Facility: MEDICAL CENTER | Age: 54
End: 2024-01-14
Payer: MEDICARE

## 2024-01-14 ENCOUNTER — APPOINTMENT (OUTPATIENT)
Dept: RADIOLOGY | Facility: MEDICAL CENTER | Age: 54
End: 2024-01-14
Attending: EMERGENCY MEDICINE
Payer: MEDICARE

## 2024-01-14 VITALS
TEMPERATURE: 97.6 F | HEIGHT: 63 IN | BODY MASS INDEX: 35.08 KG/M2 | WEIGHT: 197.97 LBS | DIASTOLIC BLOOD PRESSURE: 66 MMHG | OXYGEN SATURATION: 92 % | SYSTOLIC BLOOD PRESSURE: 133 MMHG | RESPIRATION RATE: 16 BRPM | HEART RATE: 79 BPM

## 2024-01-14 DIAGNOSIS — N39.0 URINARY TRACT INFECTION WITH HEMATURIA, SITE UNSPECIFIED: ICD-10-CM

## 2024-01-14 DIAGNOSIS — R10.9 INTRACTABLE ABDOMINAL PAIN: ICD-10-CM

## 2024-01-14 DIAGNOSIS — R31.9 URINARY TRACT INFECTION WITH HEMATURIA, SITE UNSPECIFIED: ICD-10-CM

## 2024-01-14 DIAGNOSIS — R16.0 LIVER MASS: ICD-10-CM

## 2024-01-14 PROBLEM — R52 INTRACTABLE PAIN: Status: RESOLVED | Noted: 2024-01-14 | Resolved: 2024-01-14

## 2024-01-14 PROBLEM — R52 INTRACTABLE PAIN: Status: ACTIVE | Noted: 2024-01-14

## 2024-01-14 LAB
ALBUMIN SERPL BCP-MCNC: 4.2 G/DL (ref 3.2–4.9)
ALBUMIN/GLOB SERPL: 1.6 G/DL
ALP SERPL-CCNC: 96 U/L (ref 30–99)
ALT SERPL-CCNC: 33 U/L (ref 2–50)
ANION GAP SERPL CALC-SCNC: 12 MMOL/L (ref 7–16)
APPEARANCE UR: CLEAR
AST SERPL-CCNC: 22 U/L (ref 12–45)
BACTERIA #/AREA URNS HPF: NEGATIVE /HPF
BASOPHILS # BLD AUTO: 0.5 % (ref 0–1.8)
BASOPHILS # BLD: 0.04 K/UL (ref 0–0.12)
BILIRUB SERPL-MCNC: 0.3 MG/DL (ref 0.1–1.5)
BILIRUB UR QL STRIP.AUTO: NEGATIVE
BUN SERPL-MCNC: 11 MG/DL (ref 8–22)
CALCIUM ALBUM COR SERPL-MCNC: 8.9 MG/DL (ref 8.5–10.5)
CALCIUM SERPL-MCNC: 9.1 MG/DL (ref 8.5–10.5)
CANCER AG19-9 SERPL-ACNC: 5.4 U/ML (ref 0–35)
CEA SERPL-MCNC: 1.6 NG/ML (ref 0–3)
CHLORIDE SERPL-SCNC: 104 MMOL/L (ref 96–112)
CO2 SERPL-SCNC: 23 MMOL/L (ref 20–33)
COLOR UR: YELLOW
CREAT SERPL-MCNC: 0.61 MG/DL (ref 0.5–1.4)
EKG IMPRESSION: NORMAL
EOSINOPHIL # BLD AUTO: 0.51 K/UL (ref 0–0.51)
EOSINOPHIL NFR BLD: 6.8 % (ref 0–6.9)
EPI CELLS #/AREA URNS HPF: ABNORMAL /HPF
ERYTHROCYTE [DISTWIDTH] IN BLOOD BY AUTOMATED COUNT: 39.3 FL (ref 35.9–50)
GFR SERPLBLD CREATININE-BSD FMLA CKD-EPI: 108 ML/MIN/1.73 M 2
GLOBULIN SER CALC-MCNC: 2.7 G/DL (ref 1.9–3.5)
GLUCOSE SERPL-MCNC: 122 MG/DL (ref 65–99)
GLUCOSE UR STRIP.AUTO-MCNC: NEGATIVE MG/DL
HCG SERPL QL: NEGATIVE
HCT VFR BLD AUTO: 44.3 % (ref 37–47)
HGB BLD-MCNC: 14.7 G/DL (ref 12–16)
HYALINE CASTS #/AREA URNS LPF: ABNORMAL /LPF
IMM GRANULOCYTES # BLD AUTO: 0.02 K/UL (ref 0–0.11)
IMM GRANULOCYTES NFR BLD AUTO: 0.3 % (ref 0–0.9)
KETONES UR STRIP.AUTO-MCNC: ABNORMAL MG/DL
LEUKOCYTE ESTERASE UR QL STRIP.AUTO: ABNORMAL
LIPASE SERPL-CCNC: 32 U/L (ref 11–82)
LYMPHOCYTES # BLD AUTO: 2.58 K/UL (ref 1–4.8)
LYMPHOCYTES NFR BLD: 34.6 % (ref 22–41)
MCH RBC QN AUTO: 29.6 PG (ref 27–33)
MCHC RBC AUTO-ENTMCNC: 33.2 G/DL (ref 32.2–35.5)
MCV RBC AUTO: 89.1 FL (ref 81.4–97.8)
MICRO URNS: ABNORMAL
MONOCYTES # BLD AUTO: 0.52 K/UL (ref 0–0.85)
MONOCYTES NFR BLD AUTO: 7 % (ref 0–13.4)
NEUTROPHILS # BLD AUTO: 3.79 K/UL (ref 1.82–7.42)
NEUTROPHILS NFR BLD: 50.8 % (ref 44–72)
NITRITE UR QL STRIP.AUTO: NEGATIVE
NRBC # BLD AUTO: 0 K/UL
NRBC BLD-RTO: 0 /100 WBC (ref 0–0.2)
PH UR STRIP.AUTO: 6 [PH] (ref 5–8)
PLATELET # BLD AUTO: 228 K/UL (ref 164–446)
PMV BLD AUTO: 10.3 FL (ref 9–12.9)
POTASSIUM SERPL-SCNC: 3.6 MMOL/L (ref 3.6–5.5)
PROT SERPL-MCNC: 6.9 G/DL (ref 6–8.2)
PROT UR QL STRIP: NEGATIVE MG/DL
RBC # BLD AUTO: 4.97 M/UL (ref 4.2–5.4)
RBC # URNS HPF: ABNORMAL /HPF
RBC UR QL AUTO: ABNORMAL
SODIUM SERPL-SCNC: 139 MMOL/L (ref 135–145)
SP GR UR STRIP.AUTO: 1.03
UROBILINOGEN UR STRIP.AUTO-MCNC: 1 MG/DL
WBC # BLD AUTO: 7.5 K/UL (ref 4.8–10.8)
WBC #/AREA URNS HPF: ABNORMAL /HPF

## 2024-01-14 PROCEDURE — 85025 COMPLETE CBC W/AUTO DIFF WBC: CPT

## 2024-01-14 PROCEDURE — 700111 HCHG RX REV CODE 636 W/ 250 OVERRIDE (IP)

## 2024-01-14 PROCEDURE — 96374 THER/PROPH/DIAG INJ IV PUSH: CPT

## 2024-01-14 PROCEDURE — A9270 NON-COVERED ITEM OR SERVICE: HCPCS

## 2024-01-14 PROCEDURE — 87086 URINE CULTURE/COLONY COUNT: CPT

## 2024-01-14 PROCEDURE — 93005 ELECTROCARDIOGRAM TRACING: CPT

## 2024-01-14 PROCEDURE — 84703 CHORIONIC GONADOTROPIN ASSAY: CPT

## 2024-01-14 PROCEDURE — 700111 HCHG RX REV CODE 636 W/ 250 OVERRIDE (IP): Mod: JZ,UD | Performed by: STUDENT IN AN ORGANIZED HEALTH CARE EDUCATION/TRAINING PROGRAM

## 2024-01-14 PROCEDURE — 700111 HCHG RX REV CODE 636 W/ 250 OVERRIDE (IP): Mod: JZ

## 2024-01-14 PROCEDURE — 74160 CT ABDOMEN W/CONTRAST: CPT

## 2024-01-14 PROCEDURE — 93010 ELECTROCARDIOGRAM REPORT: CPT | Performed by: INTERNAL MEDICINE

## 2024-01-14 PROCEDURE — 82378 CARCINOEMBRYONIC ANTIGEN: CPT

## 2024-01-14 PROCEDURE — 86301 IMMUNOASSAY TUMOR CA 19-9: CPT

## 2024-01-14 PROCEDURE — 96375 TX/PRO/DX INJ NEW DRUG ADDON: CPT

## 2024-01-14 PROCEDURE — 700102 HCHG RX REV CODE 250 W/ 637 OVERRIDE(OP)

## 2024-01-14 PROCEDURE — 74176 CT ABD & PELVIS W/O CONTRAST: CPT

## 2024-01-14 PROCEDURE — 99285 EMERGENCY DEPT VISIT HI MDM: CPT

## 2024-01-14 PROCEDURE — 96376 TX/PRO/DX INJ SAME DRUG ADON: CPT

## 2024-01-14 PROCEDURE — G0378 HOSPITAL OBSERVATION PER HR: HCPCS

## 2024-01-14 PROCEDURE — 99236 HOSP IP/OBS SAME DATE HI 85: CPT | Performed by: STUDENT IN AN ORGANIZED HEALTH CARE EDUCATION/TRAINING PROGRAM

## 2024-01-14 PROCEDURE — A9270 NON-COVERED ITEM OR SERVICE: HCPCS | Mod: UD

## 2024-01-14 PROCEDURE — 80053 COMPREHEN METABOLIC PANEL: CPT

## 2024-01-14 PROCEDURE — 82105 ALPHA-FETOPROTEIN SERUM: CPT

## 2024-01-14 PROCEDURE — A9270 NON-COVERED ITEM OR SERVICE: HCPCS | Performed by: STUDENT IN AN ORGANIZED HEALTH CARE EDUCATION/TRAINING PROGRAM

## 2024-01-14 PROCEDURE — 99222 1ST HOSP IP/OBS MODERATE 55: CPT | Performed by: SURGERY

## 2024-01-14 PROCEDURE — 700117 HCHG RX CONTRAST REV CODE 255: Mod: UD

## 2024-01-14 PROCEDURE — 96372 THER/PROPH/DIAG INJ SC/IM: CPT

## 2024-01-14 PROCEDURE — 700102 HCHG RX REV CODE 250 W/ 637 OVERRIDE(OP): Mod: UD

## 2024-01-14 PROCEDURE — 700102 HCHG RX REV CODE 250 W/ 637 OVERRIDE(OP): Performed by: STUDENT IN AN ORGANIZED HEALTH CARE EDUCATION/TRAINING PROGRAM

## 2024-01-14 PROCEDURE — 36415 COLL VENOUS BLD VENIPUNCTURE: CPT

## 2024-01-14 PROCEDURE — 83690 ASSAY OF LIPASE: CPT

## 2024-01-14 PROCEDURE — 700111 HCHG RX REV CODE 636 W/ 250 OVERRIDE (IP): Mod: JZ,UD | Performed by: EMERGENCY MEDICINE

## 2024-01-14 PROCEDURE — 81001 URINALYSIS AUTO W/SCOPE: CPT

## 2024-01-14 RX ORDER — DIPHENHYDRAMINE HCL 25 MG
25 TABLET ORAL ONCE
Status: COMPLETED | OUTPATIENT
Start: 2024-01-14 | End: 2024-01-14

## 2024-01-14 RX ORDER — FLUTICASONE PROPIONATE AND SALMETEROL 100; 50 UG/1; UG/1
1 POWDER RESPIRATORY (INHALATION) 2 TIMES DAILY
Status: DISCONTINUED | OUTPATIENT
Start: 2024-01-14 | End: 2024-01-14

## 2024-01-14 RX ORDER — OXYCODONE HYDROCHLORIDE 5 MG/1
5 TABLET ORAL EVERY 8 HOURS PRN
Qty: 9 TABLET | Refills: 0 | Status: SHIPPED | OUTPATIENT
Start: 2024-01-14 | End: 2024-01-17

## 2024-01-14 RX ORDER — NITROFURANTOIN 25; 75 MG/1; MG/1
100 CAPSULE ORAL ONCE
Status: DISCONTINUED | OUTPATIENT
Start: 2024-01-14 | End: 2024-01-14

## 2024-01-14 RX ORDER — OXYCODONE HYDROCHLORIDE 10 MG/1
10 TABLET ORAL
Status: DISCONTINUED | OUTPATIENT
Start: 2024-01-14 | End: 2024-01-14 | Stop reason: HOSPADM

## 2024-01-14 RX ORDER — POLYETHYLENE GLYCOL 3350 17 G/17G
1 POWDER, FOR SOLUTION ORAL
Status: DISCONTINUED | OUTPATIENT
Start: 2024-01-14 | End: 2024-01-14 | Stop reason: HOSPADM

## 2024-01-14 RX ORDER — AMOXICILLIN 250 MG
2 CAPSULE ORAL 2 TIMES DAILY
Status: DISCONTINUED | OUTPATIENT
Start: 2024-01-14 | End: 2024-01-14 | Stop reason: HOSPADM

## 2024-01-14 RX ORDER — NITROGLYCERIN 0.4 MG/1
0.4 TABLET SUBLINGUAL
COMMUNITY

## 2024-01-14 RX ORDER — LEVOTHYROXINE SODIUM 112 UG/1
112 TABLET ORAL
Status: DISCONTINUED | OUTPATIENT
Start: 2024-01-14 | End: 2024-01-14 | Stop reason: HOSPADM

## 2024-01-14 RX ORDER — FLUTICASONE PROPIONATE AND SALMETEROL 50; 100 UG/1; UG/1
1 POWDER RESPIRATORY (INHALATION) 2 TIMES DAILY
COMMUNITY
Start: 2023-11-21

## 2024-01-14 RX ORDER — ONDANSETRON 2 MG/ML
4 INJECTION INTRAMUSCULAR; INTRAVENOUS EVERY 4 HOURS PRN
Status: DISCONTINUED | OUTPATIENT
Start: 2024-01-14 | End: 2024-01-14 | Stop reason: HOSPADM

## 2024-01-14 RX ORDER — ENOXAPARIN SODIUM 100 MG/ML
40 INJECTION SUBCUTANEOUS DAILY
Status: DISCONTINUED | OUTPATIENT
Start: 2024-01-14 | End: 2024-01-14 | Stop reason: HOSPADM

## 2024-01-14 RX ORDER — PROCHLORPERAZINE MALEATE 5 MG/1
5 TABLET ORAL EVERY 6 HOURS PRN
Qty: 8 TABLET | Refills: 0 | Status: SHIPPED | OUTPATIENT
Start: 2024-01-14 | End: 2024-01-16

## 2024-01-14 RX ORDER — BISACODYL 10 MG
10 SUPPOSITORY, RECTAL RECTAL
Status: DISCONTINUED | OUTPATIENT
Start: 2024-01-14 | End: 2024-01-14 | Stop reason: HOSPADM

## 2024-01-14 RX ORDER — LORATADINE 10 MG/1
10 TABLET ORAL DAILY
Status: DISCONTINUED | OUTPATIENT
Start: 2024-01-14 | End: 2024-01-14 | Stop reason: HOSPADM

## 2024-01-14 RX ORDER — MORPHINE SULFATE 4 MG/ML
4 INJECTION INTRAVENOUS ONCE
Status: COMPLETED | OUTPATIENT
Start: 2024-01-14 | End: 2024-01-14

## 2024-01-14 RX ORDER — CEFTRIAXONE 1 G/1
1000 INJECTION, POWDER, FOR SOLUTION INTRAMUSCULAR; INTRAVENOUS ONCE
Status: COMPLETED | OUTPATIENT
Start: 2024-01-14 | End: 2024-01-14

## 2024-01-14 RX ORDER — ALBUTEROL SULFATE 90 UG/1
2 AEROSOL, METERED RESPIRATORY (INHALATION) EVERY 6 HOURS PRN
Status: DISCONTINUED | OUTPATIENT
Start: 2024-01-14 | End: 2024-01-14 | Stop reason: HOSPADM

## 2024-01-14 RX ORDER — PROCHLORPERAZINE EDISYLATE 5 MG/ML
10 INJECTION INTRAMUSCULAR; INTRAVENOUS EVERY 6 HOURS PRN
Status: DISCONTINUED | OUTPATIENT
Start: 2024-01-14 | End: 2024-01-14 | Stop reason: HOSPADM

## 2024-01-14 RX ORDER — DIPHENHYDRAMINE HCL 25 MG
TABLET ORAL
Status: COMPLETED
Start: 2024-01-14 | End: 2024-01-14

## 2024-01-14 RX ORDER — DICYCLOMINE HCL 20 MG
20 TABLET ORAL ONCE
Status: DISCONTINUED | OUTPATIENT
Start: 2024-01-14 | End: 2024-01-14

## 2024-01-14 RX ORDER — LORATADINE 10 MG/1
10 TABLET ORAL DAILY
COMMUNITY
Start: 2023-11-06 | End: 2024-11-04

## 2024-01-14 RX ORDER — ONDANSETRON 2 MG/ML
4 INJECTION INTRAMUSCULAR; INTRAVENOUS ONCE
Status: COMPLETED | OUTPATIENT
Start: 2024-01-14 | End: 2024-01-14

## 2024-01-14 RX ORDER — HYDROMORPHONE HYDROCHLORIDE 1 MG/ML
0.5 INJECTION, SOLUTION INTRAMUSCULAR; INTRAVENOUS; SUBCUTANEOUS
Status: DISCONTINUED | OUTPATIENT
Start: 2024-01-14 | End: 2024-01-14 | Stop reason: HOSPADM

## 2024-01-14 RX ORDER — OXYCODONE HYDROCHLORIDE 5 MG/1
5 TABLET ORAL
Status: DISCONTINUED | OUTPATIENT
Start: 2024-01-14 | End: 2024-01-14 | Stop reason: HOSPADM

## 2024-01-14 RX ADMIN — PROCHLORPERAZINE EDISYLATE 10 MG: 5 INJECTION, SOLUTION INTRAMUSCULAR; INTRAVENOUS at 10:14

## 2024-01-14 RX ADMIN — MOMETASONE FUROATE AND FORMOTEROL FUMARATE DIHYDRATE 2 PUFF: 100; 5 AEROSOL RESPIRATORY (INHALATION) at 07:13

## 2024-01-14 RX ADMIN — PROCHLORPERAZINE EDISYLATE 10 MG: 5 INJECTION, SOLUTION INTRAMUSCULAR; INTRAVENOUS at 18:32

## 2024-01-14 RX ADMIN — IOHEXOL 100 ML: 350 INJECTION, SOLUTION INTRAVENOUS at 17:18

## 2024-01-14 RX ADMIN — Medication 25 MG: at 04:15

## 2024-01-14 RX ADMIN — ENOXAPARIN SODIUM 40 MG: 100 INJECTION SUBCUTANEOUS at 17:30

## 2024-01-14 RX ADMIN — ONDANSETRON 4 MG: 2 INJECTION INTRAMUSCULAR; INTRAVENOUS at 01:09

## 2024-01-14 RX ADMIN — LEVOTHYROXINE SODIUM 112 MCG: 0.11 TABLET ORAL at 05:04

## 2024-01-14 RX ADMIN — CEFTRIAXONE SODIUM 1000 MG: 1 INJECTION, POWDER, FOR SOLUTION INTRAMUSCULAR; INTRAVENOUS at 03:36

## 2024-01-14 RX ADMIN — MORPHINE SULFATE 4 MG: 4 INJECTION, SOLUTION INTRAMUSCULAR; INTRAVENOUS at 01:10

## 2024-01-14 RX ADMIN — LORATADINE 10 MG: 10 TABLET ORAL at 05:05

## 2024-01-14 RX ADMIN — OXYCODONE HYDROCHLORIDE 10 MG: 10 TABLET ORAL at 05:05

## 2024-01-14 RX ADMIN — MOMETASONE FUROATE AND FORMOTEROL FUMARATE DIHYDRATE 2 PUFF: 100; 5 AEROSOL RESPIRATORY (INHALATION) at 17:30

## 2024-01-14 RX ADMIN — DOCUSATE SODIUM 50 MG AND SENNOSIDES 8.6 MG 2 TABLET: 8.6; 5 TABLET, FILM COATED ORAL at 17:30

## 2024-01-14 RX ADMIN — OXYCODONE 5 MG: 5 TABLET ORAL at 14:45

## 2024-01-14 RX ADMIN — OXYCODONE 5 MG: 5 TABLET ORAL at 18:31

## 2024-01-14 RX ADMIN — ONDANSETRON 4 MG: 2 INJECTION INTRAMUSCULAR; INTRAVENOUS at 07:12

## 2024-01-14 RX ADMIN — HYDROMORPHONE HYDROCHLORIDE 0.5 MG: 1 INJECTION, SOLUTION INTRAMUSCULAR; INTRAVENOUS; SUBCUTANEOUS at 06:11

## 2024-01-14 RX ADMIN — DIPHENHYDRAMINE HYDROCHLORIDE 25 MG: 25 TABLET ORAL at 04:15

## 2024-01-14 RX ADMIN — DOCUSATE SODIUM 50 MG AND SENNOSIDES 8.6 MG 2 TABLET: 8.6; 5 TABLET, FILM COATED ORAL at 05:04

## 2024-01-14 RX ADMIN — FENTANYL CITRATE 50 MCG: 50 INJECTION, SOLUTION INTRAMUSCULAR; INTRAVENOUS at 03:24

## 2024-01-14 ASSESSMENT — FIBROSIS 4 INDEX
FIB4 SCORE: 0.86
FIB4 SCORE: .923076923076923077

## 2024-01-14 ASSESSMENT — ENCOUNTER SYMPTOMS
VOMITING: 0
BLOOD IN STOOL: 0
BLURRED VISION: 0
COUGH: 0
CHILLS: 0
NECK PAIN: 0
PALPITATIONS: 0
SHORTNESS OF BREATH: 0
FEVER: 0
MYALGIAS: 0
SORE THROAT: 0
WEAKNESS: 0
FOCAL WEAKNESS: 0
HEARTBURN: 0
DIZZINESS: 0
WEIGHT LOSS: 0
ABDOMINAL PAIN: 1
DOUBLE VISION: 0
NAUSEA: 0

## 2024-01-14 ASSESSMENT — PATIENT HEALTH QUESTIONNAIRE - PHQ9
SUM OF ALL RESPONSES TO PHQ9 QUESTIONS 1 AND 2: 0
2. FEELING DOWN, DEPRESSED, IRRITABLE, OR HOPELESS: NOT AT ALL
1. LITTLE INTEREST OR PLEASURE IN DOING THINGS: NOT AT ALL

## 2024-01-14 ASSESSMENT — LIFESTYLE VARIABLES
ALCOHOL_USE: YES
CONSUMPTION TOTAL: NEGATIVE
DOES PATIENT WANT TO STOP DRINKING: NO
HAVE PEOPLE ANNOYED YOU BY CRITICIZING YOUR DRINKING: NO
TOTAL SCORE: 0
AVERAGE NUMBER OF DAYS PER WEEK YOU HAVE A DRINK CONTAINING ALCOHOL: 0
ON A TYPICAL DAY WHEN YOU DRINK ALCOHOL HOW MANY DRINKS DO YOU HAVE: 0
HOW MANY TIMES IN THE PAST YEAR HAVE YOU HAD 5 OR MORE DRINKS IN A DAY: 0
TOTAL SCORE: 0
HAVE YOU EVER FELT YOU SHOULD CUT DOWN ON YOUR DRINKING: NO
EVER FELT BAD OR GUILTY ABOUT YOUR DRINKING: NO
EVER HAD A DRINK FIRST THING IN THE MORNING TO STEADY YOUR NERVES TO GET RID OF A HANGOVER: NO
TOTAL SCORE: 0

## 2024-01-14 ASSESSMENT — PAIN DESCRIPTION - PAIN TYPE
TYPE: ACUTE PAIN

## 2024-01-14 NOTE — PROGRESS NOTES
Patient has her neuromodulator card from [employer] from implant from 2014.   Inputted data into MRI screening form  Texted MRI tech with no response  Contacted lead MRI supervisor, and alerted him that we are having issues finding whether or not this stimulator is MRI compatable.     Contacted [employer] twice via phone to try to get information as to compatability and asked to have a rep call us back.   The operators have directed us to their website, which has manuals for some of the neuromodulators but does not specifically state MRI conditions.     Awaiting response from MRI lead Ector.

## 2024-01-14 NOTE — PROGRESS NOTES
4 Eyes Skin Assessment Completed by NORM Suarez and NORM Camara.    Head WDL  Ears WDL  Nose WDL  Mouth WDL  Neck WDL  Breast/Chest WDL  Shoulder Blades WDL  Spine WDL  (R) Arm/Elbow/Hand Bruising, brace in place  (L) Arm/Elbow/Hand WDL  Abdomen WDL  Groin WDL  Scrotum/Coccyx/Buttocks WDL  (R) Leg WDL  (L) Leg WDL  (R) Heel/Foot/Toe WDL  (L) Heel/Foot/Toe WDL          Devices In Places Pulse Ox, arm brace      Interventions In Place Pillows    Possible Skin Injury No    Pictures Uploaded Into Epic N/A  Wound Consult Placed N/A  RN Wound Prevention Protocol Ordered No

## 2024-01-14 NOTE — CONSULTS
Christopher Allen M.D.    Date & Time note created:    1/14/2024   2:20 PM     Referring MD / APRN:  Cat Schaffer M.D., MATTHEW Yang    Patient ID:  Name:             Gina Scott   YOB: 1970  Age:                 53 y.o.  female   MRN:               [id number]    Chief Complaint/Reason for Visit:     Flank Pain (Left side, couple hours, history of kidney stones)      History of Present Illness:    Gina Scott is a 53 y.o. female   HPI  I was asked to see this patient with right upper quadrant abdominal pain.  She was admitted last night with rather sudden onset.  She states she has had this pain before.  Previously she attributed to kidney stone she has had on the left side as well as the right she started with left-sided pain last evening and now it was on the right side.  It has persisted since last evening.  She was evaluated in the emergency room.  Urinalysis showed pyuria without significant hematuria.  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  The patient was felt to have a vague hypodensity in the right lobe of the liver which appeared to be new compared to previous studies previous studies have had contrast administration.  She did not have hydronephrosis.  Liver function tests were normal as was her white count.  There is no free fluid in the abdomen or other evidence of localized inflammation.  Patient denies symptoms of acid peptic disease.  The pain is not necessarily dissimilar from that she had with her gallbladder but her gallbladder is absent.  There may be some slight physiologic dilatation of the extrahepatic biliary tree.  There is no perihepatic fluid.  She did fall and break her wrist on the snow and ice earlier in the day yesterday but did not really land on her right side.  Liver trauma would certainly be associated with abnormal liver function test.  Patient has  a pain stimulator in her back which precludes MRI.  I was asked to see her regarding any further workup which could be initiated at this point in time.  She they were planning discharge but she remained tender with pain without clinical improvement disposition was to go to oncology for further evaluation  Review of Systems:  ROS  No urinary symptoms no pulmonary symptoms pain is not pleuritic she denies pain rib pain she has some chronic back pain nothing new.  The pain in the right upper quadrant does seem to radiate into the back.  It is otherwise negative and 10 categories  Past Medical History:   Past Medical History:   Diagnosis Date    Asthma     Bronchitis     Chickenpox     Chronic back pain 11/24/2009    ENDOMETRIOSIS 11/24/2009    Grave's disease 11/24/2009    Hypothyroid 11/24/2009    Hypothyroid     Hypothyroidism     Influenza     Kidney stones     Mild intermittent asthma without complication 1/13/2017    Osteoporosis     PND (post-nasal drip)     Seizure disorder (HCC)     Sleep apnea     Spinal cord stimulator status     Tonsillitis     Venereal disease     Vitamin d deficiency 11/24/2009       Past Surgical History:  Past Surgical History:   Procedure Laterality Date    ABDOMINAL HYSTERECTOMY TOTAL      ovarian  cyst endometriosis    APPENDECTOMY      CHOLECYSTECTOMY      LAMINOTOMY      fusion L5    LAPAROTOMY      OTHER ORTHOPEDIC SURGERY      fusion       Current Outpatient Medications:  Current Facility-Administered Medications   Medication Dose Route Frequency Provider Last Rate Last Admin    loratadine (Claritin) tablet 10 mg  10 mg Oral DAILY Tim Carl M.D.   10 mg at 01/14/24 0505    senna-docusate (Pericolace Or Senokot S) 8.6-50 MG per tablet 2 Tablet  2 Tablet Oral BID Tim Carl M.D.   2 Tablet at 01/14/24 0504    And    polyethylene glycol/lytes (Miralax) Packet 1 Packet  1 Packet Oral QDAY PRN Tim Carl M.D.        And    magnesium hydroxide (Milk Of Magnesia)  suspension 30 mL  30 mL Oral QDAY PRN Tim Carl M.D.        And    bisacodyl (Dulcolax) suppository 10 mg  10 mg Rectal QDAY PRN Tim Carl M.D.        Pharmacy Consult Request ...Pain Management Review 1 Each  1 Each Other PHARMACY TO DOSE Tim Carl M.D.        enoxaparin (Lovenox) inj 40 mg  40 mg Subcutaneous DAILY AT 1800 Tim Carl M.D.        oxyCODONE immediate-release (Roxicodone) tablet 5 mg  5 mg Oral Q3HRS PRN Tim Carl M.D.        Or    oxyCODONE immediate release (Roxicodone) tablet 10 mg  10 mg Oral Q3HRS PRN Tim Carl M.D.   10 mg at 01/14/24 0505    Or    HYDROmorphone (Dilaudid) injection 0.5 mg  0.5 mg Intravenous Q3HRS PRN Tim Carl M.D.   0.5 mg at 01/14/24 0611    albuterol inhaler 2 Puff  2 Puff Inhalation Q6HRS PRN Tim Carl M.D.        levothyroxine (Synthroid) tablet 112 mcg  112 mcg Oral AM ES Tim Carl M.D.   112 mcg at 01/14/24 0504    ondansetron (Zofran) syringe/vial injection 4 mg  4 mg Intravenous Q4HRS PRN Julia Hankins, A.P.R.N.   4 mg at 01/14/24 0712    mometasone-formoterol (Dulera) 100-5 MCG/ACT inhaler 2 Puff  2 Puff Inhalation BID Alex Orellana, A.P.R.N.        prochlorperazine (Compazine) injection 10 mg  10 mg Intravenous Q6HRS PRN Alex Orellana, A.P.R.N.   10 mg at 01/14/24 1014       Allergies:  Allergies   Allergen Reactions    Darvocet [Propoxyphene N-Apap] Anaphylaxis    [place] Swelling     Mouth swells up      Hctz [Hydrochlorothiazide] Shortness of Breath    Pcn [Penicillins] Hives and Shortness of Breath     Tolerated ceftriaxone       Family History:  Family History   Problem Relation Age of Onset    Hypertension Mother     Diabetes Mother     Hypertension Father     Diabetes Father     Cancer Father     Cancer Maternal Aunt         aunt and cousin have breast cancer    No Known Problems Sister     No Known Problems Brother     No Known Problems Son     No Known Problems Son      No Known Problems Son     Genetic Disorder Neg Hx     Lung Disease Neg Hx     Sleep Apnea Neg Hx        Social History:  Social History     Socioeconomic History    Marital status:      Spouse name: Not on file    Number of children: Not on file    Years of education: Not on file    Highest education level: Not on file   Occupational History    Not on file   Tobacco Use    Smoking status: Former     Current packs/day: 0.00     Average packs/day: 0.2 packs/day for 22.0 years (3.3 ttl pk-yrs)     Types: Cigarettes     Start date: 1992     Quit date: 2014     Years since quittin.5    Smokeless tobacco: Never    Tobacco comments:     smoked pack and half a week in 2009   Vaping Use    Vaping Use: Never used   Substance and Sexual Activity    Alcohol use: No     Alcohol/week: 0.0 oz    Drug use: No    Sexual activity: Not on file     Comment:    Other Topics Concern    Not on file   Social History Narrative    Not on file     Social Determinants of Health     Financial Resource Strain: Not on file   Food Insecurity: Not on file   Transportation Needs: Not on file   Physical Activity: Not on file   Stress: Not on file   Social Connections: Not on file   Intimate Partner Violence: Not on file   Housing Stability: Not on file          Physical Exam:  Physical Exam  Patient was sleeping I awakened her.  She is friendly and cooperative.  She does not appear to be in marked acute distress.  HEENT examination was unremarkable neck was supple lungs were clear there is no pleural friction rub.  Cardiac examination was normal.  There is no tenderness about the ribs scapula or shoulder.  Breasts were not examined.  There is no bruising to the chest the lateral abdominal wall or abdomen teary her abdominal wall.  Patient has some tenderness to deep palpation right upper quadrant but no peritoneal findings.  There are no abdominal wall hernias.  Extremities are free of deformity there is a  "pain pump in the right back area.  Neurologically she is intact  Oriented x 3  Weight/BMI: Body mass index is 35.63 kg/m².  /53   Pulse 88   Temp 36.8 °C (98.2 °F) (Temporal)   Resp 18   Ht 1.588 m (5' 2.5\")   Wt 89.8 kg (197 lb 15.6 oz)   SpO2 90%     Not recorded         Pertinent Lab/Test/Imaging Review:  Basically normal chemistries and CBC.  Imaging as discussed above.  I did review his CT there is a vague area of hypodensity which is somewhat irregularly marginated in the right lobe of the liver.  Radiologist speculated this could be associated with neoplasm.  There is a similar hypodense area in the hilum which could represent physiologic enlargement of the common bile duct status postcholecystectomy.  There is no free fluid in the abdomen no evidence of inflammation solid viscera and hollow viscera look normal.    Assessment and Plan:     Patient has right upper quadrant abdominal pain and tenderness of uncertain etiology.  Patient has right upper quadrant abdominal pain and tenderness of uncertain etiology.  She does not have a surgical abdomen or target.  I discussed this with hospitalist.  The CT scan was done without contrast I think maybe 1 should be done with contrast and perhaps that will offer more diagnostic possibilities.  GI sources such as duodenal ulcer or gastritis are also possibilities although the patient really does not have significant gastrointestinal symptoms.  Patient does have chronic pain.  She did fall yesterday.  I do not think this abnormality liver is traumatic.        Christopher Allen M.D.    "

## 2024-01-14 NOTE — H&P
Hospital Medicine History & Physical Note    Date of Service  1/14/2024    Primary Care Physician  Cat Schaffer M.D.    Code Status  Full Code    Chief Complaint  Chief Complaint   Patient presents with    Flank Pain     Left side, couple hours, history of kidney stones       History of Presenting Illness  Gina Scott is a 53 y.o. female who presented 1/14/2024 with abdominal pain.  Gina is a very pleasant woman with history of hypothyroidism, previous episode of pancreatitis, hepatosteatosis and previous nephrolithiasis.  She presents due to abdominal pain.  Initially complaining more of left-sided flank pain however she then was complaining more of right upper quadrant pain.  CT did not show any nephrolithiasis however did show hypodensity in the liver suspicious for mass.  On exam she is very tender in the right upper quadrant.  She was requiring multiple doses of IV medications to control her pain.  She denies alcohol, drugs, IVDU.  She reports she is up-to-date on her cancer screenings, she reports she has had multiple colonoscopies, unclear why she has had multiple at her age but she says they were normal.  She is having continued pain at rest.  Admission requested for further pain control and further workup of the liver mass.    I discussed the plan of care with patient and family.    Review of Systems  Review of Systems   Constitutional:  Negative for chills, fever and weight loss.   HENT:  Negative for congestion and sore throat.    Eyes:  Negative for blurred vision and double vision.   Respiratory:  Negative for cough and shortness of breath.    Cardiovascular:  Negative for chest pain and palpitations.   Gastrointestinal:  Positive for abdominal pain. Negative for blood in stool, heartburn, melena, nausea and vomiting.   Genitourinary:  Negative for dysuria and urgency.   Musculoskeletal:  Negative for myalgias and neck pain.   Neurological:  Negative for dizziness, focal  weakness and weakness.       Past Medical History   has a past medical history of Asthma, Bronchitis, Chickenpox, Chronic back pain (11/24/2009), ENDOMETRIOSIS (11/24/2009), Grave's disease (11/24/2009), Hypothyroid (11/24/2009), Hypothyroid, Hypothyroidism, Influenza, Kidney stones, Mild intermittent asthma without complication (1/13/2017), Osteoporosis, PND (post-nasal drip), Seizure disorder (HCC), Sleep apnea, Spinal cord stimulator status, Tonsillitis, Venereal disease, and Vitamin d deficiency (11/24/2009).    Surgical History   has a past surgical history that includes other orthopedic surgery; abdominal hysterectomy total; appendectomy; laminotomy; cholecystectomy; and laparotomy.     Family History  family history includes Cancer in her father and maternal aunt; Diabetes in her father and mother; Hypertension in her father and mother; No Known Problems in her brother, sister, son, son, and son.   Family history reviewed with patient. There is family history that is pertinent to the chief complaint.     Social History   reports that she quit smoking about 9 years ago. Her smoking use included cigarettes. She started smoking about 31 years ago. She has a 3.3 pack-year smoking history. She has never used smokeless tobacco. She reports that she does not drink alcohol and does not use drugs.    Allergies  Allergies   Allergen Reactions    Darvocet [Propoxyphene N-Apap] Anaphylaxis    [place] Swelling     Mouth swells up      Hctz [Hydrochlorothiazide] Shortness of Breath    Pcn [Penicillins] Hives and Shortness of Breath     Tolerated ceftriaxone       Medications  Prior to Admission Medications   Prescriptions Last Dose Informant Patient Reported? Taking?   albuterol (VENTOLIN OR PROVENTIL) 108 (90 BASE) MCG/ACT AERS inhalation aerosol  Patient No No   Sig: Inhale 2 Puffs by mouth every 6 hours as needed for Shortness of Breath.   aspirin EC (ECOTRIN) 81 MG Tablet Delayed Response  Patient Yes No   Sig: Take 81  mg by mouth every day.   ibuprofen (MOTRIN) 200 MG Tab  Patient Yes No   Sig: Take 400 mg by mouth every 6 hours as needed for Mild Pain or Inflammation.   levothyroxine (SYNTHROID) 112 MCG Tab  Patient Yes No   Sig: Take 112 mcg by mouth every morning on an empty stomach.   nitroglycerin (NITROSTAT) 0.4 MG SL Tab   Yes No      Facility-Administered Medications: None       Physical Exam  Temp:  [36.6 °C (97.8 °F)] 36.6 °C (97.8 °F)  Pulse:  [62-80] 62  Resp:  [20] 20  BP: (112-121)/(57-65) 121/57  SpO2:  [94 %] 94 %  Blood Pressure: 112/65       Pulse: 80   Respiration: 20   Pulse Oximetry: 94 %       Physical Exam  Constitutional:       General: She is not in acute distress.     Appearance: She is not toxic-appearing.   HENT:      Head: Normocephalic and atraumatic.      Nose: Nose normal.      Mouth/Throat:      Mouth: Mucous membranes are dry.      Pharynx: Oropharynx is clear.   Eyes:      Extraocular Movements: Extraocular movements intact.      Conjunctiva/sclera: Conjunctivae normal.   Cardiovascular:      Rate and Rhythm: Normal rate and regular rhythm.      Pulses: Normal pulses.      Heart sounds: Normal heart sounds.   Pulmonary:      Effort: Pulmonary effort is normal.      Breath sounds: Normal breath sounds.   Abdominal:      Palpations: Abdomen is soft.      Tenderness: There is abdominal tenderness.      Comments: Very tender in right upper quadrant   Musculoskeletal:         General: No swelling or deformity.      Cervical back: Neck supple. No rigidity.   Skin:     General: Skin is warm and dry.   Neurological:      General: No focal deficit present.      Mental Status: She is oriented to person, place, and time.         Laboratory:  Recent Labs     01/14/24  0027   WBC 7.5   RBC 4.97   HEMOGLOBIN 14.7   HEMATOCRIT 44.3   MCV 89.1   MCH 29.6   MCHC 33.2   RDW 39.3   PLATELETCT 228   MPV 10.3     Recent Labs     01/14/24  0027   SODIUM 139   POTASSIUM 3.6   CHLORIDE 104   CO2 23   GLUCOSE 122*   BUN  "11   CREATININE 0.61   CALCIUM 9.1     Recent Labs     01/14/24  0027   ALTSGPT 33   ASTSGOT 22   ALKPHOSPHAT 96   TBILIRUBIN 0.3   LIPASE 32   GLUCOSE 122*         No results for input(s): \"NTPROBNP\" in the last 72 hours.      No results for input(s): \"TROPONINT\" in the last 72 hours.    Imaging:  CT-RENAL COLIC EVALUATION(A/P W/O)   Final Result      1.  No urinary tract calculus identified. No renal collecting system dilatation.   2.  7.9 x 6.8 cm area of hypodensity in the right hepatic lobe concerning for underlying hepatic mass. Recommend MRI hepatic protocol for further evaluation.   3.  Additional smaller area of hypodensity about the hilum may reflect an additional mass, and abnormal gallbladder, or biliary duct dilatation.      MR-ABDOMEN-WITH & W/O    (Results Pending)       Assessment/Plan:  Justification for Admission Status  I anticipate this patient is appropriate for observation status at this time because further workup of hepatic mass and pain control requiring multiple doses of IV medications with monitoring for toxicity    Patient will need a Med/Surg bed on EMERGENCY service .  The need is secondary to above.    * Intractable pain- (present on admission)  Assessment & Plan  Requiring multiple doses of IV pain medications to control pain  In setting of possible liver mass  Multimodal pain control  Continuous pulse ox  Transition to p.o. pain regimen    Liver mass  Assessment & Plan  7.9 x 6.8 cm hypodensity seen on CT, suspicious for hepatic mass.  Likely cause for patient's current abdominal pain  Will get MRI hepatic protocol to further assess    Hypothyroidism- (present on admission)  Assessment & Plan  Continue levothyroxine        VTE prophylaxis: SCDs/TEDs and enoxaparin ppx  "

## 2024-01-14 NOTE — ASSESSMENT & PLAN NOTE
7.9 x 6.8 cm hypodensity seen on CT, suspicious for hepatic mass.  Likely cause for patient's current abdominal pain  Will get MRI hepatic protocol to further assess

## 2024-01-14 NOTE — ED NOTES
Med rec complete per patient  Allergies reviewed.   Patient denies any outpatient antibiotics in the last 30 days.   Anticoagulants taken in the last 14 days? No     Pharmacy patient utilizes: [facility]

## 2024-01-14 NOTE — ED TRIAGE NOTES
"Chief Complaint   Patient presents with    Flank Pain     Left side, couple hours, history of kidney stones     [facility] for the above complaint. Patient states that the pain started a couple hours ago and was a 10/10 prior to EMS adminstering 30 mg Toradol which brought the pain down to 6/10 currently. Patient has a history of kidney stones.     Protocol ordered.    /65   Pulse 80   Resp 20   Ht 1.575 m (5' 2\")   Wt 81.6 kg (180 lb)   SpO2 94%    "

## 2024-01-14 NOTE — ED PROVIDER NOTES
ED Provider Note    CHIEF COMPLAINT  Chief Complaint   Patient presents with    Flank Pain     Left side, couple hours, history of kidney stones       EXTERNAL RECORDS REVIEWED  Inpatient Notes Discharge summary 2/12/2022 following admission for chest pain.  History of hypothyroidism, lying with home medications.  Chest pain, nausea, vomiting, diaphoresis relieved with nitroglycerin.  Monitor without evidence for arrhythmia.  Troponins were negative, cardiac stress test did not show reversible ischemia.  Discharged home    HPI/ROS  LIMITATION TO HISTORY   Select: : None  OUTSIDE HISTORIAN(S):  None    Gina Scott is a 53 y.o. female who presents to the emergency department by ambulance from home for left flank pain.  Patient describes sudden onset left flank pain approximately 1 hour prior to arrival.  Pain at left flank, now radiating across abdomen.  She does have history of kidney stones, feels similar.  Nausea without vomiting.  Chills.  Urinated without difficulty, denies hematuria.  No diarrhea.    PAST MEDICAL HISTORY   has a past medical history of Asthma, Bronchitis, Chickenpox, Chronic back pain (11/24/2009), ENDOMETRIOSIS (11/24/2009), Grave's disease (11/24/2009), Hypothyroid (11/24/2009), Hypothyroid, Hypothyroidism, Influenza, Kidney stones, Mild intermittent asthma without complication (1/13/2017), Osteoporosis, PND (post-nasal drip), Seizure disorder ([facility]), Sleep apnea, Spinal cord stimulator status, Tonsillitis, Venereal disease, and Vitamin d deficiency (11/24/2009).    SURGICAL HISTORY   has a past surgical history that includes other orthopedic surgery; abdominal hysterectomy total; appendectomy; laminotomy; cholecystectomy; and laparotomy.    FAMILY HISTORY  Family History   Problem Relation Age of Onset    Hypertension Mother     Diabetes Mother     Hypertension Father     Diabetes Father     Cancer Father     Cancer Maternal Aunt         aunt and cousin have breast cancer    No  "Known Problems Sister     No Known Problems Brother     No Known Problems Son     No Known Problems Son     No Known Problems Son     Genetic Disorder Neg Hx     Lung Disease Neg Hx     Sleep Apnea Neg Hx        SOCIAL HISTORY  Social History     Tobacco Use    Smoking status: Former     Current packs/day: 0.00     Average packs/day: 0.2 packs/day for 22.0 years (3.3 ttl pk-yrs)     Types: Cigarettes     Start date: 1992     Quit date: 2014     Years since quittin.5    Smokeless tobacco: Never    Tobacco comments:     smoked pack and half a week in 2009   Vaping Use    Vaping Use: Never used   Substance and Sexual Activity    Alcohol use: No     Alcohol/week: 0.0 oz    Drug use: No    Sexual activity: Not on file     Comment:        CURRENT MEDICATIONS  Home Medications       Reviewed by Daniela Ugalde R.N. (Registered Nurse) on 24 at 0425  Med List Status: Complete     Medication Last Dose Status   ADVAIR DISKUS 100-50 MCG/ACT AEROSOL POWDER, BREATH ACTIVATED 2024 Active   albuterol (VENTOLIN OR PROVENTIL) 108 (90 BASE) MCG/ACT AERS inhalation aerosol FEW DAYS AGO Active   aspirin EC (ECOTRIN) 81 MG Tablet Delayed Response 1/3/2024 Active   ibuprofen (MOTRIN) 800 MG Tab 2024 Active   levothyroxine (SYNTHROID) 112 MCG Tab 2024 Active   loratadine (CLARITIN) 10 MG Tab 2024 Active   nitroglycerin (NITROSTAT) 0.4 MG SL Tab 1/3/2024 Active                    ALLERGIES  Allergies   Allergen Reactions    Darvocet [Propoxyphene N-Apap] Anaphylaxis    [place] Swelling     Mouth swells up      Hctz [Hydrochlorothiazide] Shortness of Breath    Pcn [Penicillins] Hives and Shortness of Breath     Tolerated ceftriaxone       PHYSICAL EXAM  VITAL SIGNS: /57   Pulse 62   Temp 36.6 °C (97.8 °F) (Temporal)   Resp 20   Ht 1.588 m (5' 2.5\")   Wt 89.8 kg (197 lb 15.6 oz)   LMP 1998   SpO2 94%   BMI 35.63 kg/m²    Pulse ox interpretation: I interpret this pulse " ox as normal.  Constitutional: Alert.  Mild distress secondary discomfort.  HENT: Normocephalic, atraumatic. Bilateral external ears normal, Nose normal. Moist mucous membranes.    Eyes: Pupils are equal and reactive, Conjunctiva normal.   Neck: Normal range of motion, Supple  Lymphatic: No lymphadenopathy noted.   Cardiovascular: Regular rate and rhythm, no murmurs. Distal pulses intact.   Thorax & Lungs: Normal breath sounds.  No wheezing/rales/ronchi. No increased work of breathing  Abdomen: Obese, soft, nondistended.  Tender to palpation diffusely, seemingly more so on the left side of the abdomen,  Without guarding or peritonitis.  No CVA tenderness percussion.  Skin: Warm, Dry, No erythema, No rash.   Musculoskeletal: Good range of motion in all major joints.   Neurologic: Alert and orient x 4.  Speech clear and cohesive  Psychiatric: Affect normal, Judgment normal, Mood normal.       DIAGNOSTIC STUDIES / PROCEDURES    LABS  Results for orders placed or performed during the hospital encounter of 01/14/24   CBC WITH DIFFERENTIAL   Result Value Ref Range    WBC 7.5 4.8 - 10.8 K/uL    RBC 4.97 4.20 - 5.40 M/uL    Hemoglobin 14.7 12.0 - 16.0 g/dL    Hematocrit 44.3 37.0 - 47.0 %    MCV 89.1 81.4 - 97.8 fL    MCH 29.6 27.0 - 33.0 pg    MCHC 33.2 32.2 - 35.5 g/dL    RDW 39.3 35.9 - 50.0 fL    Platelet Count 228 164 - 446 K/uL    MPV 10.3 9.0 - 12.9 fL    Neutrophils-Polys 50.80 44.00 - 72.00 %    Lymphocytes 34.60 22.00 - 41.00 %    Monocytes 7.00 0.00 - 13.40 %    Eosinophils 6.80 0.00 - 6.90 %    Basophils 0.50 0.00 - 1.80 %    Immature Granulocytes 0.30 0.00 - 0.90 %    Nucleated RBC 0.00 0.00 - 0.20 /100 WBC    Neutrophils (Absolute) 3.79 1.82 - 7.42 K/uL    Lymphs (Absolute) 2.58 1.00 - 4.80 K/uL    Monos (Absolute) 0.52 0.00 - 0.85 K/uL    Eos (Absolute) 0.51 0.00 - 0.51 K/uL    Baso (Absolute) 0.04 0.00 - 0.12 K/uL    Immature Granulocytes (abs) 0.02 0.00 - 0.11 K/uL    NRBC (Absolute) 0.00 K/uL   COMP  METABOLIC PANEL   Result Value Ref Range    Sodium 139 135 - 145 mmol/L    Potassium 3.6 3.6 - 5.5 mmol/L    Chloride 104 96 - 112 mmol/L    Co2 23 20 - 33 mmol/L    Anion Gap 12.0 7.0 - 16.0    Glucose 122 (H) 65 - 99 mg/dL    Bun 11 8 - 22 mg/dL    Creatinine 0.61 0.50 - 1.40 mg/dL    Calcium 9.1 8.5 - 10.5 mg/dL    Correct Calcium 8.9 8.5 - 10.5 mg/dL    AST(SGOT) 22 12 - 45 U/L    ALT(SGPT) 33 2 - 50 U/L    Alkaline Phosphatase 96 30 - 99 U/L    Total Bilirubin 0.3 0.1 - 1.5 mg/dL    Albumin 4.2 3.2 - 4.9 g/dL    Total Protein 6.9 6.0 - 8.2 g/dL    Globulin 2.7 1.9 - 3.5 g/dL    A-G Ratio 1.6 g/dL   LIPASE   Result Value Ref Range    Lipase 32 11 - 82 U/L   HCG QUAL SERUM   Result Value Ref Range    Beta-Hcg Qualitative Serum Negative Negative   URINALYSIS,CULTURE IF INDICATED    Specimen: Urine, Clean Catch   Result Value Ref Range    Color Yellow     Character Clear     Specific Gravity 1.031 <1.035    Ph 6.0 5.0 - 8.0    Glucose Negative Negative mg/dL    Ketones Trace (A) Negative mg/dL    Protein Negative Negative mg/dL    Bilirubin Negative Negative    Urobilinogen, Urine 1.0 Negative    Nitrite Negative Negative    Leukocyte Esterase Small (A) Negative    Occult Blood Trace (A) Negative    Micro Urine Req Microscopic    URINE MICROSCOPIC (W/UA)   Result Value Ref Range    WBC 20-50 (A) /hpf    RBC 5-10 (A) /hpf    Bacteria Negative None /hpf    Epithelial Cells Few /hpf    Hyaline Cast 6-10 (A) /lpf   ESTIMATED GFR   Result Value Ref Range    GFR (CKD-EPI) 108 >60 mL/min/1.73 m 2     RADIOLOGY  I have independently interpreted the diagnostic imaging associated with this visit and am waiting the final reading from the radiologist.     Radiologist interpretation:   CT-RENAL COLIC EVALUATION(A/P W/O)   Final Result      1.  No urinary tract calculus identified. No renal collecting system dilatation.   2.  7.9 x 6.8 cm area of hypodensity in the right hepatic lobe concerning for underlying hepatic mass.  Recommend MRI hepatic protocol for further evaluation.   3.  Additional smaller area of hypodensity about the hilum may reflect an additional mass, and abnormal gallbladder, or biliary duct dilatation.      MR-ABDOMEN-WITH & W/O    (Results Pending)         COURSE & MEDICAL DECISION MAKING    ED Observation Status? No; Patient does not meet criteria for ED Observation.     INITIAL ASSESSMENT, COURSE AND PLAN  Care Narrative:   Patient seen evaluated bedside.  Left flank pain for 2 hours, history of kidney stones.  Given Toradol by EMS without relief.  Add Zofran and, morphine.  Labs pending per protocol, add CT.    No leukocytosis, left shift or bandemia.  No anemia.  No electrolyte derangement.  Renal function preserved.  Urinalysis does have trace blood, small leuk esterase, 20-50 WBCs with few epithelials, negative for bacteria.      CT without urinary tract calculus.  No renal collecting system dilatation.  Of note is approximately 7 x 8 cm area of hypodensity in the right hepatic lobe concerning for mass.  MRI recommended.  Additional smaller hypodensities near the hilum.    0324 -evaluated bedside.  Ongoing abdominal pain, continues to radiate across the abdomen, no greater on the right side, right upper quadrant.  Worse with palpation but still without guarding or peritonitis.  Hemodynamically stable without fever or tachycardia.  Add fentanyl.  Add Rocephin for UTI, clinically without evidence for pyelonephritis or sepsis.  However, given ongoing pain, hepatic mass should be hospitalized for further workup and characterization.    ADDITIONAL PROBLEM LIST  Hypothyroid  Chronic back pain    DISPOSITION AND DISCUSSIONS  I have discussed management of the patient with the following physicians and ADRIEN's:    3:24 AM Bed Requested    3:42 AM Dr. Carl is aware of the patient agreeable to consultation.      FINAL DIAGNOSIS  1. Intractable abdominal pain    2. Liver mass    3. Urinary tract infection with  hematuria, site unspecified           Electronically signed by: Julia Bhatt D.O., 1/14/2024 1:07 AM

## 2024-01-14 NOTE — DISCHARGE SUMMARY
Discharge Summary    CHIEF COMPLAINT ON ADMISSION  Chief Complaint   Patient presents with    Flank Pain     Left side, couple hours, history of kidney stones       Reason for Admission  Intractable pain     Admission Date  1/14/2024    CODE STATUS  Full Code    HPI & HOSPITAL COURSE  Gina Scott is a 53 y.o. female who presented 1/14/2024 with abdominal pain.  Gina is a very pleasant woman with history of hypothyroidism, previous episode of pancreatitis, hepatosteatosis and previous nephrolithiasis.  She presents due to abdominal pain.      Initially complaining more of left-sided flank pain however she then was complaining more of right upper quadrant pain.  CT did not show any nephrolithiasis or hydronephrosis however did show hypodensity in the liver suspicious for mass.  On exam she is very tender in the right upper quadrant.  She was requiring multiple doses of IV medications to control her pain.  She denies alcohol, drugs, IVDU.  She reports she is up-to-date on her cancer screenings, she reports she has had multiple colonoscopies, unclear why she has had multiple at her age but she says they were normal.  She was having continued pain at rest.  Admission requested for further pain control and further workup of the liver mass including MRI.     Her symptoms did progressively improve to the point where she was comfortable at rest but did have some remaining tenderness to deep palpation of right upper quadrant. Unfortunately after investigation it was found that her [employer] implant has not been tested for MRI and thus we cannot safely scan her.     Contacted general surgery and surgical oncology to see what other workup may be helpful. General surgery did not feel this was traumatic or acute abdomen requiring emergent surgery. Performed CT with triple phase liver protocol. Also sent CA 19-9, CEA, and AFP markers. Once scans and labs were obtained, she was cleared to discharge home to follow  with surgical oncologist Dr. Antonio's office on Tuesday or Wednesday for liver mass.     Pain and nausea controlled on oral medications. Provided prescriptions for these - oxycodone 5mg q8hr and compazine 5mg q6hr - to her chosen pharmacy [facility] in [place]. Advised on return precautions. Provided contact information for Dr. Antonio's office.    Therefore, she is discharged in good and stable condition to home with close outpatient follow-up.    Discharge Date  1/14/2024    FOLLOW UP ITEMS POST DISCHARGE  Follow with Dr. Antonio within 3-5 days  Follow with PCP  Short course of oxycodone and compazine  Return to ED for worsening symptoms    DISCHARGE DIAGNOSES  Principal Problem (Resolved):    Intractable pain (POA: Yes)  Active Problems:    Hypothyroidism (POA: Yes)      Overview: Has not check TSH in long time takes 0.125 mg one a day    Liver mass (POA: Unknown)      FOLLOW UP  Steve Antonio M.D.  [address] [facility] [address]  [facility] [address]  [phone]    Go on 1/16/2024  Dr. Antonio will be speaking to his office and arranging you an appointment on Tuesday or Wednesday to discuss your results.   If you do not hear from him by Tuesday morning, call his office      MEDICATIONS ON DISCHARGE     Medication List        START taking these medications        Instructions   oxyCODONE immediate-release 5 MG Tabs  Commonly known as: Roxicodone   Take 1 Tablet by mouth every 8 hours as needed for Severe Pain for up to 3 days.  Dose: 5 mg     prochlorperazine 5 MG Tabs  Commonly known as: Compazine   Take 1 Tablet by mouth every 6 hours as needed for Nausea/Vomiting for up to 2 days.  Dose: 5 mg            CONTINUE taking these medications        Instructions   Advair Diskus 100-50 MCG/ACT Aepb  Generic drug: fluticasone-salmeterol   Inhale 1 Puff 2 times a day.  Dose: 1 Puff     albuterol 108 (90 Base) MCG/ACT Aers inhalation aerosol   Inhale 2 Puffs by mouth every 6 hours as needed for Shortness of  Breath.  Dose: 2 Puff     aspirin EC 81 MG Tbec  Commonly known as: Ecotrin   Take 486 mg by mouth one time as needed (Chest pain). 486 mg = 6 tablets  Dose: 486 mg     ibuprofen 800 MG Tabs  Commonly known as: Motrin   Take 800 mg by mouth every 8 hours as needed for Mild Pain or Inflammation.  Dose: 800 mg     levothyroxine 112 MCG Tabs  Commonly known as: Synthroid   Take 112 mcg by mouth every morning on an empty stomach.  Dose: 112 mcg     loratadine 10 MG Tabs  Commonly known as: Claritin   Take 10 mg by mouth every day.  Dose: 10 mg     nitroglycerin 0.4 MG Subl  Commonly known as: Nitrostat   Place 0.4 mg under the tongue every 5 minutes as needed for Chest Pain.  Dose: 0.4 mg              Allergies  Allergies   Allergen Reactions    Darvocet [Propoxyphene N-Apap] Anaphylaxis    [place] Swelling     Mouth swells up      Hctz [Hydrochlorothiazide] Shortness of Breath    Pcn [Penicillins] Hives and Shortness of Breath     Tolerated ceftriaxone       DIET  Orders Placed This Encounter   Procedures    Diet Order Diet: Clear Liquid     Standing Status:   Standing     Number of Occurrences:   1     Order Specific Question:   Diet:     Answer:   Clear Liquid [10]       ACTIVITY  As tolerated.  Weight bearing as tolerated    CONSULTATIONS  General surgery, surgical oncology    PROCEDURES  none    LABORATORY  Lab Results   Component Value Date    SODIUM 139 01/14/2024    POTASSIUM 3.6 01/14/2024    CHLORIDE 104 01/14/2024    CO2 23 01/14/2024    GLUCOSE 122 (H) 01/14/2024    BUN 11 01/14/2024    CREATININE 0.61 01/14/2024    CREATININE 0.7 01/22/2006        Lab Results   Component Value Date    WBC 7.5 01/14/2024    HEMOGLOBIN 14.7 01/14/2024    HEMATOCRIT 44.3 01/14/2024    PLATELETCT 228 01/14/2024        Total time of the discharge process exceeds 35 minutes.

## 2024-01-14 NOTE — PROGRESS NOTES
Assumed care of patient. She is resting in bed, reporting pain has improved but she is nauseated due to receiving pain meds while NPO. Updated on plan of care, she is to have an MRI of her abdomen today. Awaiting zofran to be approved from pharmacy.   VSS, call light in reach

## 2024-01-14 NOTE — ED NOTES
Patient ambulated to the bathroom independently with steady gait. Urine sample collected and sent.

## 2024-01-14 NOTE — PROGRESS NOTES
Patient arrived to the unit. Pt ambulated to bed. Pt A&Ox4, NAD, VSS on RA. C/o severe RUQ and R wrist pain. Awaiting medication verification. POC discussed with patient, all questions addressed. Call light within reach.

## 2024-01-14 NOTE — CARE PLAN
Problem: Pain - Standard  Goal: Alleviation of pain or a reduction in pain to the patient’s comfort goal  Outcome: Progressing     Problem: Knowledge Deficit - Standard  Goal: Patient and family/care givers will demonstrate understanding of plan of care, disease process/condition, diagnostic tests and medications  Outcome: Progressing   The patient is Stable - Low risk of patient condition declining or worsening    Shift Goals  Clinical Goals: Abd MRI, pain control  Patient Goals: Comfort  Family Goals: Not at bedside    Progress made toward(s) clinical / shift goals:  decrease nausea and pain    Patient is not progressing towards the following goals:

## 2024-01-14 NOTE — CARE PLAN
The patient is Stable - Low risk of patient condition declining or worsening    Shift Goals  Clinical Goals: Abd MRI, pain control  Patient Goals: Comfort  Family Goals: Not at bedside    Progress made toward(s) clinical / shift goals:      Problem: Pain - Standard  Goal: Alleviation of pain or a reduction in pain to the patient’s comfort goal  Outcome: Progressing     Problem: Knowledge Deficit - Standard  Goal: Patient and family/care givers will demonstrate understanding of plan of care, disease process/condition, diagnostic tests and medications  Outcome: Progressing       Patient is not progressing towards the following goals:

## 2024-01-14 NOTE — ASSESSMENT & PLAN NOTE
Requiring multiple doses of IV pain medications to control pain  In setting of possible liver mass  Multimodal pain control  Continuous pulse ox  Transition to p.o. pain regimen

## 2024-01-15 NOTE — HOSPITAL COURSE
Gina Scott is a 53 y.o. female who presented 1/14/2024 with abdominal pain.  Gina is a very pleasant woman with history of hypothyroidism, previous episode of pancreatitis, hepatosteatosis and previous nephrolithiasis.  She presents due to abdominal pain.      Initially complaining more of left-sided flank pain however she then was complaining more of right upper quadrant pain.  CT did not show any nephrolithiasis or hydronephrosis however did show hypodensity in the liver suspicious for mass.  On exam she is very tender in the right upper quadrant.  She was requiring multiple doses of IV medications to control her pain.  She denies alcohol, drugs, IVDU.  She reports she is up-to-date on her cancer screenings, she reports she has had multiple colonoscopies, unclear why she has had multiple at her age but she says they were normal.  She was having continued pain at rest.  Admission requested for further pain control and further workup of the liver mass including MRI.     Her symptoms did progressively improve to the point where she was comfortable at rest but did have some remaining tenderness to deep palpation of right upper quadrant. Unfortunately after investigation it was found that her [employer] implant has not been tested for MRI and thus we cannot safely scan her.     Contacted general surgery and surgical oncology to see what other workup may be helpful. General surgery did not feel this was traumatic or acute abdomen requiring emergent surgery. Performed CT with triple phase liver protocol. Also sent CA 19-9, CEA, and AFP markers. Once scans and labs were obtained, she was cleared to discharge home to follow with surgical oncologist Dr. Antonio's office on Tuesday or Wednesday for liver mass.     Pain and nausea controlled on oral medications. Provided prescriptions for these - oxycodone 5mg q8hr and compazine 5mg q6hr - to her chosen pharmacy [facility] in [place]. Advised on return  precautions. Provided contact information for Dr. Antonio's office.

## 2024-01-15 NOTE — PROGRESS NOTES
Patient has been medically cleared for discharge.   Son is at bedside to transport her home. Medicated with oxycodone and compazine (vs zofran per her request) prior to discharge. All belongings in hand.   Discharge instructions given to patient and verbalized understanding. Medications sent to [facility] [address] and she will  tomorrow.   She will follow up with Dr Beck on Tuesday.

## 2024-01-15 NOTE — PROGRESS NOTES
Subjective:   1/17/2024 10:30 AM  Primary care physician: Cat Schaffer M.D.  Referring Provider: GEMMA Yang     Chief Complaint:   Chief Complaint   Patient presents with    New Patient     ED CONSULT  RUQ PAIN  LIVER FINDING?  HX PANCREATITIS        Diagnosis:   1. RUQ pain        2. Liver mass        3. Acute pancreatitis, unspecified complication status, unspecified pancreatitis type          History of presenting illness:    Gina Scott is a pleasant 53 y.o. female who was seen in ED on 1/13/24 for acute onset RUQ abdominal pain. Later discharged home on 1/14/24.    CT LIVER on 1/14/24 noted area of hypodensity occupying most the RIGHT liver is most compatible with fatty infiltration. Gallbladder absent or decompressed    Patient has a pain stimulator in her back which precludes MRI.      and CEA both WNL    She is here today for further evaluation.  Her right-sided abdominal pain has abated.    Past Medical History:   Diagnosis Date    Asthma     Bronchitis     Chickenpox     Chronic back pain 11/24/2009    ENDOMETRIOSIS 11/24/2009    Grave's disease 11/24/2009    Hypothyroid 11/24/2009    Hypothyroid     Hypothyroidism     Influenza     Kidney stones     Mild intermittent asthma without complication 1/13/2017    Osteoporosis     PND (post-nasal drip)     Seizure disorder (HCC)     Sleep apnea     Spinal cord stimulator status     Tonsillitis     Venereal disease     Vitamin d deficiency 11/24/2009     Past Surgical History:   Procedure Laterality Date    ABDOMINAL HYSTERECTOMY TOTAL      ovarian  cyst endometriosis    APPENDECTOMY      CHOLECYSTECTOMY      LAMINOTOMY      fusion L5    LAPAROTOMY      OTHER ORTHOPEDIC SURGERY      fusion     Allergies   Allergen Reactions    Darvocet [Propoxyphene N-Apap] Anaphylaxis    [place] Swelling     Mouth swells up      Hctz [Hydrochlorothiazide] Shortness of Breath    Pcn [Penicillins] Hives and Shortness of Breath      Tolerated ceftriaxone     Outpatient Encounter Medications as of 2024   Medication Sig Dispense Refill    nitroglycerin (NITROSTAT) 0.4 MG SL Tab Place 0.4 mg under the tongue every 5 minutes as needed for Chest Pain.      ADVAIR DISKUS 100-50 MCG/ACT AEROSOL POWDER, BREATH ACTIVATED Inhale 1 Puff 2 times a day.      loratadine (CLARITIN) 10 MG Tab Take 10 mg by mouth every day.      oxyCODONE immediate-release (ROXICODONE) 5 MG Tab Take 1 Tablet by mouth every 8 hours as needed for Severe Pain for up to 3 days. 9 Tablet 0    [] prochlorperazine (COMPAZINE) 5 MG Tab Take 1 Tablet by mouth every 6 hours as needed for Nausea/Vomiting for up to 2 days. 8 Tablet 0    aspirin EC (ECOTRIN) 81 MG Tablet Delayed Response Take 486 mg by mouth one time as needed (Chest pain). 486 mg = 6 tablets      ibuprofen (MOTRIN) 800 MG Tab Take 800 mg by mouth every 8 hours as needed for Mild Pain or Inflammation.      levothyroxine (SYNTHROID) 112 MCG Tab Take 112 mcg by mouth every morning on an empty stomach.      albuterol (VENTOLIN OR PROVENTIL) 108 (90 BASE) MCG/ACT AERS inhalation aerosol Inhale 2 Puffs by mouth every 6 hours as needed for Shortness of Breath. 8.5 g 3     No facility-administered encounter medications on file as of 2024.     Social History     Socioeconomic History    Marital status:      Spouse name: Not on file    Number of children: Not on file    Years of education: Not on file    Highest education level: Not on file   Occupational History    Not on file   Tobacco Use    Smoking status: Former     Current packs/day: 0.00     Average packs/day: 0.2 packs/day for 22.0 years (3.3 ttl pk-yrs)     Types: Cigarettes     Start date: 1992     Quit date: 2014     Years since quittin.5    Smokeless tobacco: Never    Tobacco comments:     smoked pack and half a week in 2009   Vaping Use    Vaping Use: Never used   Substance and Sexual Activity    Alcohol use: No      "Alcohol/week: 0.0 oz    Drug use: No    Sexual activity: Not on file     Comment:    Other Topics Concern    Not on file   Social History Narrative    Not on file     Social Determinants of Health     Financial Resource Strain: Not on file   Food Insecurity: Not on file   Transportation Needs: Not on file   Physical Activity: Not on file   Stress: Not on file   Social Connections: Not on file   Intimate Partner Violence: Not on file   Housing Stability: Not on file      Social History     Tobacco Use   Smoking Status Former    Current packs/day: 0.00    Average packs/day: 0.2 packs/day for 22.0 years (3.3 ttl pk-yrs)    Types: Cigarettes    Start date: 1992    Quit date: 2014    Years since quittin.5   Smokeless Tobacco Never   Tobacco Comments    smoked pack and half a week in 2009     Social History     Substance and Sexual Activity   Alcohol Use No    Alcohol/week: 0.0 oz     Social History     Substance and Sexual Activity   Drug Use No      Family History   Problem Relation Age of Onset    Hypertension Mother     Diabetes Mother     Hypertension Father     Diabetes Father     Cancer Father     Cancer Maternal Aunt         aunt and cousin have breast cancer    No Known Problems Sister     No Known Problems Brother     No Known Problems Son     No Known Problems Son     No Known Problems Son     Genetic Disorder Neg Hx     Lung Disease Neg Hx     Sleep Apnea Neg Hx      Review of Systems   Constitutional:  Negative for chills, diaphoresis, fever, malaise/fatigue and weight loss.   HENT: Negative.     Eyes: Negative.    Respiratory: Negative.     Cardiovascular: Negative.    Gastrointestinal:  Positive for abdominal pain.   Skin: Negative.         Objective:   /68 (BP Location: Left arm, Patient Position: Sitting)   Pulse 80   Temp 37.1 °C (98.8 °F) (Temporal)   Ht 1.588 m (5' 2.5\")   Wt 89.4 kg (197 lb)   LMP 1998   SpO2 99%   BMI 35.46 kg/m²     Physical " Exam  Constitutional:       General: She is not in acute distress.     Appearance: Normal appearance. She is obese. She is not ill-appearing.   HENT:      Head: Normocephalic and atraumatic.   Eyes:      General: No scleral icterus.     Extraocular Movements: Extraocular movements intact.      Pupils: Pupils are equal, round, and reactive to light.   Abdominal:      Palpations: Abdomen is soft.      Tenderness: There is no abdominal tenderness.   Neurological:      Mental Status: She is alert.         Labs   Latest Reference Range & Units 01/14/24 00:27   WBC 4.8 - 10.8 K/uL 7.5   RBC 4.20 - 5.40 M/uL 4.97   Hemoglobin 12.0 - 16.0 g/dL 14.7   Hematocrit 37.0 - 47.0 % 44.3   MCV 81.4 - 97.8 fL 89.1   MCH 27.0 - 33.0 pg 29.6   MCHC 32.2 - 35.5 g/dL 33.2   RDW 35.9 - 50.0 fL 39.3   Platelet Count 164 - 446 K/uL 228   MPV 9.0 - 12.9 fL 10.3      Latest Reference Range & Units 01/14/24 00:27   Sodium 135 - 145 mmol/L 139   Potassium 3.6 - 5.5 mmol/L 3.6   Chloride 96 - 112 mmol/L 104   Co2 20 - 33 mmol/L 23   Anion Gap 7.0 - 16.0  12.0   Glucose 65 - 99 mg/dL 122 (H)   Bun 8 - 22 mg/dL 11   Creatinine 0.50 - 1.40 mg/dL 0.61   GFR (CKD-EPI) >60 mL/min/1.73 m 2 108   Calcium 8.5 - 10.5 mg/dL 9.1   Correct Calcium 8.5 - 10.5 mg/dL 8.9   AST(SGOT) 12 - 45 U/L 22   ALT(SGPT) 2 - 50 U/L 33   Alkaline Phosphatase 30 - 99 U/L 96   Total Bilirubin 0.1 - 1.5 mg/dL 0.3   Albumin 3.2 - 4.9 g/dL 4.2   Total Protein 6.0 - 8.2 g/dL 6.9   Globulin 1.9 - 3.5 g/dL 2.7   A-G Ratio g/dL 1.6   Lipase 11 - 82 U/L 32   (H): Data is abnormally high      Latest Reference Range & Units 01/14/24 15:38   Ca 19-9 0.0 - 35.0 U/mL 5.4   Carcinoembryonic Antigen 0.0 - 3.0 ng/mL 1.6       Imaging  CT LIVER (1/14/24)  IMPRESSION:  1.  Geographic area of hypodensity occupying most the RIGHT liver is most compatible with fatty infiltration. This could be confirmed with MRI if clinically appropriate if the patient's spinal stimulator is compatible.  2.   Gallbladder absent or decompressed    Pathology  N/A    Procedures  N/A      Diagnosis:     1. RUQ pain        2. Liver mass        3. Acute pancreatitis, unspecified complication status, unspecified pancreatitis type            Medical Decision Making:  Today's Assessment / Status / Plan:     Patient has right upper quadrant pain of unclear etiology.  Imaging is reassuring that there is no mass in her liver.  I reassured the patient and we will see her back on an as-needed basis only.  I do think the patient would benefit from referral to gastroenterology, I will leave that up to her primary care physician to organize.  She has my office number and knows that she can call anytime with questions.    Greater than 45 minutes were spent with the patient.  This included review of outside records and imaging, counseling of the patient and coordination of care.    I, Dr Levy, have entered, reviewed and confirmed the above diagnoses related to this patient on this date of service, as per the time and date noted at top of this note.

## 2024-01-15 NOTE — DISCHARGE INSTRUCTIONS
Your workup for pain revealed a possible liver mass on CT. However we were unable to do MRI to get a clearer picture for this. Dr. Antonio, the liver surgeon, asked us to get a different scan and some labs and to have you see him on Tuesday or Wednesday.

## 2024-01-16 LAB
AFP-TM SERPL-MCNC: 2 NG/ML (ref 0–9)
BACTERIA UR CULT: NORMAL
SIGNIFICANT IND 70042: NORMAL
SITE SITE: NORMAL
SOURCE SOURCE: NORMAL

## 2024-01-17 ENCOUNTER — OFFICE VISIT (OUTPATIENT)
Dept: SURGICAL ONCOLOGY | Facility: MEDICAL CENTER | Age: 54
End: 2024-01-17
Payer: MEDICARE

## 2024-01-17 VITALS
BODY MASS INDEX: 34.91 KG/M2 | DIASTOLIC BLOOD PRESSURE: 68 MMHG | TEMPERATURE: 98.8 F | WEIGHT: 197 LBS | SYSTOLIC BLOOD PRESSURE: 104 MMHG | HEIGHT: 63 IN | HEART RATE: 80 BPM | OXYGEN SATURATION: 99 %

## 2024-01-17 DIAGNOSIS — K85.90 ACUTE PANCREATITIS, UNSPECIFIED COMPLICATION STATUS, UNSPECIFIED PANCREATITIS TYPE: ICD-10-CM

## 2024-01-17 DIAGNOSIS — R16.0 LIVER MASS: ICD-10-CM

## 2024-01-17 DIAGNOSIS — R10.11 RUQ PAIN: ICD-10-CM

## 2024-01-17 PROCEDURE — 99204 OFFICE O/P NEW MOD 45 MIN: CPT | Performed by: SURGERY

## 2024-01-17 PROCEDURE — 3074F SYST BP LT 130 MM HG: CPT | Performed by: SURGERY

## 2024-01-17 PROCEDURE — 3078F DIAST BP <80 MM HG: CPT | Performed by: SURGERY

## 2024-01-17 ASSESSMENT — FIBROSIS 4 INDEX: FIB4 SCORE: 0.86

## 2024-01-17 ASSESSMENT — ENCOUNTER SYMPTOMS
WEIGHT LOSS: 0
EYES NEGATIVE: 1
DIAPHORESIS: 0
RESPIRATORY NEGATIVE: 1
CARDIOVASCULAR NEGATIVE: 1
CHILLS: 0
FEVER: 0
ABDOMINAL PAIN: 1

## 2024-08-07 ENCOUNTER — HOSPITAL ENCOUNTER (OUTPATIENT)
Dept: RADIOLOGY | Facility: MEDICAL CENTER | Age: 54
End: 2024-08-07
Attending: INTERNAL MEDICINE
Payer: MEDICARE

## 2024-08-07 DIAGNOSIS — R10.11 RIGHT UPPER QUADRANT PAIN: ICD-10-CM

## 2024-08-07 PROCEDURE — 76700 US EXAM ABDOM COMPLETE: CPT

## 2024-08-23 ENCOUNTER — OFFICE VISIT (OUTPATIENT)
Dept: CARDIOLOGY | Facility: MEDICAL CENTER | Age: 54
End: 2024-08-23
Attending: INTERNAL MEDICINE
Payer: MEDICARE

## 2024-08-23 VITALS
WEIGHT: 195.4 LBS | RESPIRATION RATE: 16 BRPM | SYSTOLIC BLOOD PRESSURE: 130 MMHG | HEIGHT: 63 IN | DIASTOLIC BLOOD PRESSURE: 80 MMHG | BODY MASS INDEX: 34.62 KG/M2 | OXYGEN SATURATION: 94 % | HEART RATE: 79 BPM

## 2024-08-23 DIAGNOSIS — Z76.89 ENCOUNTER TO ESTABLISH CARE: ICD-10-CM

## 2024-08-23 DIAGNOSIS — I20.9 ANGINA PECTORIS, UNSPECIFIED (HCC): ICD-10-CM

## 2024-08-23 LAB — EKG IMPRESSION: NORMAL

## 2024-08-23 PROCEDURE — 93005 ELECTROCARDIOGRAM TRACING: CPT | Performed by: INTERNAL MEDICINE

## 2024-08-23 PROCEDURE — 99212 OFFICE O/P EST SF 10 MIN: CPT | Performed by: INTERNAL MEDICINE

## 2024-08-23 RX ORDER — LEVOTHYROXINE SODIUM 88 UG/1
88 TABLET ORAL
COMMUNITY

## 2024-08-23 ASSESSMENT — ENCOUNTER SYMPTOMS
COUGH: 0
EYE DISCHARGE: 0
BLOOD IN STOOL: 0
VOMITING: 0
DEPRESSION: 0
BRUISES/BLEEDS EASILY: 0
MYALGIAS: 0
DIZZINESS: 0
HALLUCINATIONS: 0
SPEECH CHANGE: 0
BLURRED VISION: 0
EYE PAIN: 0
SENSORY CHANGE: 0
SHORTNESS OF BREATH: 0
PALPITATIONS: 0
DOUBLE VISION: 0
PND: 0
LOSS OF CONSCIOUSNESS: 0
WEIGHT LOSS: 0
ORTHOPNEA: 0
FEVER: 0
CLAUDICATION: 0
ABDOMINAL PAIN: 0
FALLS: 0
CHILLS: 0
HEADACHES: 0
NAUSEA: 0

## 2024-08-23 ASSESSMENT — FIBROSIS 4 INDEX: FIB4 SCORE: 0.87

## 2024-08-23 NOTE — PROGRESS NOTES
Chief Complaint   Patient presents with    Establish Care       Subjective     Gina Scott is a 54 y.o. female who presents today for cardiac care and evaluation for chest pain. Patient has chest pain at sporadic times. No specific precipitating factors or worsening factors. Chest pain is described to be pressure-like sensation however localized at anterior chest without radition. Lasting for seconds to minutes. No prior cardiac problems.    Gina Scott does not have any history of heart attack arrhythmias in the past. she never had transthoracic echocardiogram, cardiac catheterization or ablations procedure in the past.    I have personally interpreted EKG today with patient, there is no evidence of acute coronary syndrome, no evidence of prior infarct, normal LA and QT interval, no significant conduction disease. Sinus rhythm.    No family history of sudden cardiac death.    Both parents had heart disease.    She did have a negative nuclear stress test in 2022. I personally interpreted and reviewed the images with her in clinic today.    Past Medical History:   Diagnosis Date    Asthma     Bronchitis     Chickenpox     Chronic back pain 11/24/2009    ENDOMETRIOSIS 11/24/2009    Grave's disease 11/24/2009    Hypothyroid 11/24/2009    Hypothyroid     Hypothyroidism     Influenza     Kidney stones     Mild intermittent asthma without complication 1/13/2017    Osteoporosis     PND (post-nasal drip)     Seizure disorder (HCC)     Sleep apnea     Spinal cord stimulator status     Tonsillitis     Venereal disease     Vitamin d deficiency 11/24/2009     Past Surgical History:   Procedure Laterality Date    ABDOMINAL HYSTERECTOMY TOTAL      ovarian  cyst endometriosis    APPENDECTOMY      CHOLECYSTECTOMY      LAMINOTOMY      fusion L5    LAPAROTOMY      OTHER ORTHOPEDIC SURGERY      fusion     Family History   Problem Relation Age of Onset    Hypertension Mother     Diabetes Mother      Hypertension Father     Diabetes Father     Cancer Father     Cancer Maternal Aunt         aunt and cousin have breast cancer    No Known Problems Sister     No Known Problems Brother     No Known Problems Son     No Known Problems Son     No Known Problems Son     Genetic Disorder Neg Hx     Lung Disease Neg Hx     Sleep Apnea Neg Hx      Social History     Socioeconomic History    Marital status:      Spouse name: Not on file    Number of children: Not on file    Years of education: Not on file    Highest education level: Not on file   Occupational History    Not on file   Tobacco Use    Smoking status: Former     Current packs/day: 0.00     Average packs/day: 0.2 packs/day for 22.0 years (3.3 ttl pk-yrs)     Types: Cigarettes     Start date: 7/16/1992     Quit date: 7/16/2014     Years since quitting: 10.1    Smokeless tobacco: Never    Tobacco comments:     smoked pack and half a week in august of 2009   Vaping Use    Vaping status: Never Used   Substance and Sexual Activity    Alcohol use: Not Currently     Comment: Once to twice yearly    Drug use: No    Sexual activity: Not on file     Comment:    Other Topics Concern    Not on file   Social History Narrative    Not on file     Social Determinants of Health     Financial Resource Strain: Not on file   Food Insecurity: Not on file   Transportation Needs: Not on file   Physical Activity: Not on file   Stress: Not on file   Social Connections: Not on file   Intimate Partner Violence: Not on file   Housing Stability: Not on file     Allergies   Allergen Reactions    Darvocet [Propoxyphene N-Apap] Anaphylaxis    [place] Swelling     Mouth swells up      Hctz [Hydrochlorothiazide] Shortness of Breath    Pcn [Penicillins] Hives and Shortness of Breath     Tolerated ceftriaxone     Outpatient Encounter Medications as of 8/23/2024   Medication Sig Dispense Refill    levothyroxine (SYNTHROID) 88 MCG Tab Take 88 mcg by mouth every morning on an empty  stomach.      nitroglycerin (NITROSTAT) 0.4 MG SL Tab Place 0.4 mg under the tongue every 5 minutes as needed for Chest Pain.      ADVAIR DISKUS 100-50 MCG/ACT AEROSOL POWDER, BREATH ACTIVATED Inhale 1 Puff 2 times a day.      loratadine (CLARITIN) 10 MG Tab Take 10 mg by mouth every day.      aspirin EC (ECOTRIN) 81 MG Tablet Delayed Response Take 486 mg by mouth one time as needed (Chest pain). 486 mg = 6 tablets      ibuprofen (MOTRIN) 800 MG Tab Take 800 mg by mouth every 8 hours as needed for Mild Pain or Inflammation.      albuterol (VENTOLIN OR PROVENTIL) 108 (90 BASE) MCG/ACT AERS inhalation aerosol Inhale 2 Puffs by mouth every 6 hours as needed for Shortness of Breath. 8.5 g 3    [DISCONTINUED] levothyroxine (SYNTHROID) 112 MCG Tab Take 112 mcg by mouth every morning on an empty stomach. (Patient not taking: Reported on 8/23/2024)       No facility-administered encounter medications on file as of 8/23/2024.     Review of Systems   Constitutional:  Negative for chills, fever, malaise/fatigue and weight loss.   HENT:  Negative for ear discharge, ear pain, hearing loss and nosebleeds.    Eyes:  Negative for blurred vision, double vision, pain and discharge.   Respiratory:  Negative for cough and shortness of breath.    Cardiovascular:  Positive for chest pain. Negative for palpitations, orthopnea, claudication, leg swelling and PND.   Gastrointestinal:  Negative for abdominal pain, blood in stool, melena, nausea and vomiting.   Genitourinary:  Negative for dysuria and hematuria.   Musculoskeletal:  Negative for falls, joint pain and myalgias.   Skin:  Negative for itching and rash.   Neurological:  Negative for dizziness, sensory change, speech change, loss of consciousness and headaches.   Endo/Heme/Allergies:  Negative for environmental allergies. Does not bruise/bleed easily.   Psychiatric/Behavioral:  Negative for depression, hallucinations and suicidal ideas.               Objective     /80 (BP  "Location: Left arm, Patient Position: Sitting, BP Cuff Size: Adult)   Pulse 79   Resp 16   Ht 1.588 m (5' 2.52\")   Wt 88.6 kg (195 lb 6.4 oz)   LMP 01/01/1998   SpO2 94%   BMI 35.15 kg/m²     Physical Exam  Vitals and nursing note reviewed.   Constitutional:       General: She is not in acute distress.     Appearance: She is not diaphoretic.   HENT:      Head: Normocephalic and atraumatic.      Right Ear: External ear normal.      Left Ear: External ear normal.      Nose: No congestion or rhinorrhea.   Eyes:      General:         Right eye: No discharge.         Left eye: No discharge.   Neck:      Thyroid: No thyromegaly.      Vascular: No JVD.   Cardiovascular:      Rate and Rhythm: Normal rate and regular rhythm.      Pulses: Normal pulses.   Pulmonary:      Effort: No respiratory distress.   Abdominal:      General: There is no distension.      Tenderness: There is no abdominal tenderness.   Musculoskeletal:         General: No swelling or tenderness.      Right lower leg: No edema.      Left lower leg: No edema.   Skin:     General: Skin is warm and dry.   Neurological:      Mental Status: She is alert and oriented to person, place, and time.      Cranial Nerves: No cranial nerve deficit.   Psychiatric:         Behavior: Behavior normal.                Assessment & Plan     1. Angina pectoris, unspecified (HCC)  EKG    EC-ECHOCARDIOGRAM COMPLETE W/O CONT    CT-CTA HEART W/3D IMAGE      2. Encounter to establish care            Medical Decision Making: Today's Assessment/Status/Plan:   At this time, to further risk stratify, I will order a transthoracic echocardiogram to assess cardiac and valvular functions. I will order coronary CTA as well as she has had these symptoms persistently.    This visit encounter signifies the visit complexity inherent to evaluation and management associated with medical care services that serve as the continuing focal point for all needed health care services and/or with " medical care services that are part of ongoing care related to this patient's single, serious condition, complex cardiac condition.    Zeb Cervantes M.D.

## 2024-09-18 ENCOUNTER — ANCILLARY PROCEDURE (OUTPATIENT)
Dept: CARDIOLOGY | Facility: MEDICAL CENTER | Age: 54
End: 2024-09-18
Attending: INTERNAL MEDICINE
Payer: MEDICARE

## 2024-09-18 DIAGNOSIS — I20.9 ANGINA PECTORIS, UNSPECIFIED (HCC): ICD-10-CM

## 2024-09-18 LAB
LV EJECT FRACT  99904: 75
LV EJECT FRACT MOD 2C 99903: 78.48
LV EJECT FRACT MOD 4C 99902: 76.74
LV EJECT FRACT MOD BP 99901: 75.83

## 2024-09-18 PROCEDURE — 93306 TTE W/DOPPLER COMPLETE: CPT | Mod: 26 | Performed by: INTERNAL MEDICINE

## 2024-09-18 PROCEDURE — 93306 TTE W/DOPPLER COMPLETE: CPT

## 2024-10-09 ENCOUNTER — OFFICE VISIT (OUTPATIENT)
Dept: RHEUMATOLOGY | Facility: MEDICAL CENTER | Age: 54
End: 2024-10-09
Attending: STUDENT IN AN ORGANIZED HEALTH CARE EDUCATION/TRAINING PROGRAM
Payer: MEDICARE

## 2024-10-09 ENCOUNTER — HOSPITAL ENCOUNTER (OUTPATIENT)
Dept: LAB | Facility: MEDICAL CENTER | Age: 54
End: 2024-10-09
Attending: STUDENT IN AN ORGANIZED HEALTH CARE EDUCATION/TRAINING PROGRAM
Payer: MEDICARE

## 2024-10-09 VITALS
OXYGEN SATURATION: 96 % | HEIGHT: 62 IN | BODY MASS INDEX: 33.86 KG/M2 | WEIGHT: 184 LBS | DIASTOLIC BLOOD PRESSURE: 62 MMHG | HEART RATE: 78 BPM | TEMPERATURE: 98.2 F | SYSTOLIC BLOOD PRESSURE: 128 MMHG

## 2024-10-09 DIAGNOSIS — E55.9 VITAMIN D DEFICIENCY: ICD-10-CM

## 2024-10-09 DIAGNOSIS — R76.8 SEROLOGIC AUTOIMMUNITY: ICD-10-CM

## 2024-10-09 DIAGNOSIS — M25.50 ARTHRALGIA OF MULTIPLE JOINTS: ICD-10-CM

## 2024-10-09 DIAGNOSIS — R76.8 SEROLOGIC AUTOIMMUNITY: Primary | ICD-10-CM

## 2024-10-09 DIAGNOSIS — Z11.59 ENCOUNTER FOR SCREENING FOR OTHER VIRAL DISEASES: ICD-10-CM

## 2024-10-09 PROCEDURE — 3074F SYST BP LT 130 MM HG: CPT | Performed by: STUDENT IN AN ORGANIZED HEALTH CARE EDUCATION/TRAINING PROGRAM

## 2024-10-09 PROCEDURE — 99204 OFFICE O/P NEW MOD 45 MIN: CPT | Performed by: STUDENT IN AN ORGANIZED HEALTH CARE EDUCATION/TRAINING PROGRAM

## 2024-10-09 PROCEDURE — 3078F DIAST BP <80 MM HG: CPT | Performed by: STUDENT IN AN ORGANIZED HEALTH CARE EDUCATION/TRAINING PROGRAM

## 2024-10-09 PROCEDURE — 99212 OFFICE O/P EST SF 10 MIN: CPT | Performed by: STUDENT IN AN ORGANIZED HEALTH CARE EDUCATION/TRAINING PROGRAM

## 2024-10-09 RX ORDER — ERGOCALCIFEROL 1.25 MG/1
50000 CAPSULE ORAL
Qty: 12 CAPSULE | Refills: 0 | Status: CANCELLED | OUTPATIENT
Start: 2024-10-09 | End: 2025-01-01

## 2024-10-09 ASSESSMENT — FIBROSIS 4 INDEX: FIB4 SCORE: 0.87

## 2024-10-17 ENCOUNTER — HOSPITAL ENCOUNTER (OUTPATIENT)
Dept: RADIOLOGY | Facility: MEDICAL CENTER | Age: 54
End: 2024-10-17
Attending: STUDENT IN AN ORGANIZED HEALTH CARE EDUCATION/TRAINING PROGRAM
Payer: MEDICARE

## 2024-10-17 DIAGNOSIS — M25.50 ARTHRALGIA OF MULTIPLE JOINTS: ICD-10-CM

## 2024-10-17 PROCEDURE — 77077 JOINT SURVEY SINGLE VIEW: CPT

## 2024-11-04 NOTE — PROGRESS NOTES
Request received to review order/ protocol for coronary CTA on 11/4/24. Scan approved for [employer] scanner. Upon review of available medical records, no additional orders have been placed.    This CT study may be scheduled through [facility] Scheduling at , option 2.

## 2024-11-06 ENCOUNTER — APPOINTMENT (OUTPATIENT)
Dept: CARDIOLOGY | Facility: MEDICAL CENTER | Age: 54
End: 2024-11-06
Attending: INTERNAL MEDICINE
Payer: MEDICARE

## 2025-01-05 ENCOUNTER — APPOINTMENT (OUTPATIENT)
Dept: RADIOLOGY | Facility: MEDICAL CENTER | Age: 55
End: 2025-01-05
Attending: EMERGENCY MEDICINE
Payer: MEDICARE

## 2025-01-05 ENCOUNTER — HOSPITAL ENCOUNTER (EMERGENCY)
Facility: MEDICAL CENTER | Age: 55
End: 2025-01-05
Attending: EMERGENCY MEDICINE
Payer: MEDICARE

## 2025-01-05 ENCOUNTER — PHARMACY VISIT (OUTPATIENT)
Dept: PHARMACY | Facility: MEDICAL CENTER | Age: 55
End: 2025-01-05
Payer: COMMERCIAL

## 2025-01-05 VITALS
SYSTOLIC BLOOD PRESSURE: 118 MMHG | RESPIRATION RATE: 16 BRPM | HEART RATE: 67 BPM | HEIGHT: 62 IN | WEIGHT: 195 LBS | OXYGEN SATURATION: 96 % | BODY MASS INDEX: 35.88 KG/M2 | DIASTOLIC BLOOD PRESSURE: 81 MMHG | TEMPERATURE: 97.6 F

## 2025-01-05 DIAGNOSIS — M54.2 NECK PAIN: ICD-10-CM

## 2025-01-05 PROCEDURE — 96376 TX/PRO/DX INJ SAME DRUG ADON: CPT

## 2025-01-05 PROCEDURE — 96374 THER/PROPH/DIAG INJ IV PUSH: CPT

## 2025-01-05 PROCEDURE — 700111 HCHG RX REV CODE 636 W/ 250 OVERRIDE (IP): Mod: JZ,UD | Performed by: EMERGENCY MEDICINE

## 2025-01-05 PROCEDURE — RXMED WILLOW AMBULATORY MEDICATION CHARGE: Performed by: EMERGENCY MEDICINE

## 2025-01-05 PROCEDURE — 72125 CT NECK SPINE W/O DYE: CPT

## 2025-01-05 PROCEDURE — 99284 EMERGENCY DEPT VISIT MOD MDM: CPT

## 2025-01-05 PROCEDURE — 96375 TX/PRO/DX INJ NEW DRUG ADDON: CPT

## 2025-01-05 RX ORDER — ONDANSETRON 2 MG/ML
4 INJECTION INTRAMUSCULAR; INTRAVENOUS ONCE
Status: COMPLETED | OUTPATIENT
Start: 2025-01-05 | End: 2025-01-05

## 2025-01-05 RX ORDER — ONDANSETRON 4 MG/1
4 TABLET, ORALLY DISINTEGRATING ORAL EVERY 8 HOURS PRN
Qty: 8 TABLET | Refills: 0 | Status: SHIPPED | OUTPATIENT
Start: 2025-01-05 | End: 2025-01-22

## 2025-01-05 RX ORDER — MORPHINE SULFATE 4 MG/ML
4 INJECTION INTRAVENOUS ONCE
Status: COMPLETED | OUTPATIENT
Start: 2025-01-05 | End: 2025-01-05

## 2025-01-05 RX ORDER — METHOCARBAMOL 750 MG/1
1500 TABLET, FILM COATED ORAL 4 TIMES DAILY PRN
Qty: 20 TABLET | Refills: 0 | Status: SHIPPED | OUTPATIENT
Start: 2025-01-05 | End: 2025-01-22

## 2025-01-05 RX ORDER — KETOROLAC TROMETHAMINE 15 MG/ML
15 INJECTION, SOLUTION INTRAMUSCULAR; INTRAVENOUS ONCE
Status: COMPLETED | OUTPATIENT
Start: 2025-01-05 | End: 2025-01-05

## 2025-01-05 RX ORDER — OXYCODONE AND ACETAMINOPHEN 5; 325 MG/1; MG/1
1 TABLET ORAL EVERY 6 HOURS PRN
Qty: 15 TABLET | Refills: 0 | Status: SHIPPED | OUTPATIENT
Start: 2025-01-05 | End: 2025-01-05

## 2025-01-05 RX ORDER — HYDROCODONE BITARTRATE AND ACETAMINOPHEN 5; 325 MG/1; MG/1
1 TABLET ORAL EVERY 6 HOURS PRN
Qty: 15 TABLET | Refills: 0 | Status: SHIPPED | OUTPATIENT
Start: 2025-01-05 | End: 2025-01-09

## 2025-01-05 RX ADMIN — KETOROLAC TROMETHAMINE 15 MG: 15 INJECTION, SOLUTION INTRAMUSCULAR; INTRAVENOUS at 13:10

## 2025-01-05 RX ADMIN — MORPHINE SULFATE 4 MG: 4 INJECTION INTRAVENOUS at 13:06

## 2025-01-05 RX ADMIN — ONDANSETRON 4 MG: 2 INJECTION INTRAMUSCULAR; INTRAVENOUS at 14:55

## 2025-01-05 RX ADMIN — ONDANSETRON 4 MG: 2 INJECTION INTRAMUSCULAR; INTRAVENOUS at 13:15

## 2025-01-05 ASSESSMENT — PAIN DESCRIPTION - PAIN TYPE
TYPE: ACUTE PAIN

## 2025-01-05 ASSESSMENT — FIBROSIS 4 INDEX: FIB4 SCORE: 0.87

## 2025-01-05 NOTE — ED TRIAGE NOTES
"Chief Complaint   Patient presents with    Neck Pain     Pt awoke when she tried to roll on her side and felt crunching noise. Instant pain to neck. Became nauseous when she got up. Then began to have head pain. PT has degenerative disk disease, arthritis and osteoporosis. Restricted range of motion due to pain. Pain 10:10.      Pt wheeled from triage. Pt guarding neck, raised shoulders. Unable to turn head. . Skin signs pink, warm, dry. + CSM all extremities. Pt speaking full sentences, A & O x 4. Respirations equal and unlabored. Pt educated on triage process and to alert staff of any changing conditions. Pt returned to the lobby.    BP (!) 147/83   Pulse 73   Temp 36.2 °C (97.2 °F) (Temporal)   Resp 16   Ht 1.575 m (5' 2\")   Wt 88.5 kg (195 lb)   LMP 01/01/1998   SpO2 97%   BMI 35.67 kg/m²       "

## 2025-01-05 NOTE — ED NOTES
Pt educated on discharge instructions. All questions & concerns addressed. Vitals re-assessed and at pt's baseline. Pt to exit via wheelchair with all belongings.    IV d/c'd

## 2025-01-05 NOTE — ED PROVIDER NOTES
ED Provider Note    Scribed for James Driver M.D. by Mariah Arzate 1/5/2025  12:14 PM    Primary care provider: Cat Schaffer M.D.  Means of arrival: Private Vehicle    CHIEF COMPLAINT  Chief Complaint   Patient presents with    Neck Pain     Pt awoke when she tried to roll on her side and felt crunching noise. Instant pain to neck. Became nauseous when she got up. Then began to have head pain. PT has degenerative disk disease, arthritis and osteoporosis. Restricted range of motion due to pain. Pain 10:10.        EXTERNAL RECORDS REVIEWED  Winick note from November 9 reviewed from rheumatology.  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  There is nephrolithiasis and polyarthralgia.  She had low SEVERINO titer and low positive RF.    HPI    Gina Scott is a 54 y.o. female who presents to the Emergency Department for neck pain onset this morning. She has a history of undergoing a back surgery and having disk problems in the past. The patient states that while she was sleeping she tried turning to lay on her back when she was jolted awake due to the pain, adding that she heard a crunching sound and sensation. She reports that during initial onset she had pain of 10/10 in severity, but while in the ED her pain has decreased to a 7/10 in severity. The patient has associated nausea, vut denies any numbness or weakness.     LIMITATION TO HISTORY   None    OUTSIDE HISTORIAN(S):  None    REVIEW OF SYSTEMS  Pertinent positives include: neck pain, nausea.  Pertinent negatives include: numbness or weakness.      PAST MEDICAL HISTORY  Past Medical History:   Diagnosis Date    Asthma     Bronchitis     Chickenpox     Chronic back pain 11/24/2009    ENDOMETRIOSIS 11/24/2009    Grave's disease 11/24/2009    Hypothyroid 11/24/2009    Hypothyroid     Hypothyroidism     Influenza     Kidney stones     Mild intermittent asthma without complication  1/13/2017    Osteoporosis     PND (post-nasal drip)     Seizure disorder (HCC)     Sleep apnea     Spinal cord stimulator status     Tonsillitis     Venereal disease     Vitamin d deficiency 11/24/2009       FAMILY HISTORY  Family History   Problem Relation Age of Onset    Hypertension Mother     Diabetes Mother     Hypertension Father     Diabetes Father     Cancer Father     Cancer Maternal Aunt         aunt and cousin have breast cancer    No Known Problems Sister     No Known Problems Brother     No Known Problems Son     No Known Problems Son     No Known Problems Son     Genetic Disorder Neg Hx     Lung Disease Neg Hx     Sleep Apnea Neg Hx        SOCIAL HISTORY  Social History     Tobacco Use    Smoking status: Former     Current packs/day: 0.00     Average packs/day: 0.2 packs/day for 22.0 years (3.3 ttl pk-yrs)     Types: Cigarettes     Start date: 7/16/1992     Quit date: 7/16/2014     Years since quitting: 10.4     Passive exposure: Never    Smokeless tobacco: Never    Tobacco comments:     smoked pack and half a week in august of 2009   Vaping Use    Vaping status: Never Used   Substance Use Topics    Alcohol use: Not Currently     Comment: Once to twice yearly    Drug use: No     Social History     Substance and Sexual Activity   Drug Use No       SURGICAL HISTORY  Past Surgical History:   Procedure Laterality Date    ABDOMINAL HYSTERECTOMY TOTAL      ovarian  cyst endometriosis    APPENDECTOMY      CHOLECYSTECTOMY      LAMINOTOMY      fusion L5    LAPAROTOMY      OTHER ORTHOPEDIC SURGERY      fusion       CURRENT MEDICATIONS  No current facility-administered medications for this encounter.    Current Outpatient Medications:     levothyroxine (SYNTHROID) 88 MCG Tab, Take 88 mcg by mouth every morning on an empty stomach., Disp: , Rfl:     nitroglycerin (NITROSTAT) 0.4 MG SL Tab, Place 0.4 mg under the tongue every 5 minutes as needed for Chest Pain., Disp: , Rfl:     ADVAIR DISKUS 100-50 MCG/ACT AEROSOL  "POWDER, BREATH ACTIVATED, Inhale 1 Puff 2 times a day. (Patient not taking: Reported on 10/9/2024), Disp: , Rfl:     aspirin EC (ECOTRIN) 81 MG Tablet Delayed Response, Take 486 mg by mouth one time as needed (Chest pain). 486 mg = 6 tablets, Disp: , Rfl:     ibuprofen (MOTRIN) 800 MG Tab, Take 800 mg by mouth every 8 hours as needed for Mild Pain or Inflammation., Disp: , Rfl:     albuterol (VENTOLIN OR PROVENTIL) 108 (90 BASE) MCG/ACT AERS inhalation aerosol, Inhale 2 Puffs by mouth every 6 hours as needed for Shortness of Breath., Disp: 8.5 g, Rfl: 3    ALLERGIES  Allergies   Allergen Reactions    Darvocet [Propoxyphene N-Apap] Anaphylaxis    [place] Swelling     Mouth swells up      Hctz [Hydrochlorothiazide] Shortness of Breath    Pcn [Penicillins] Hives and Shortness of Breath     Tolerated ceftriaxone       PHYSICAL EXAM  VITAL SIGNS: BP (!) 147/83   Pulse 73   Temp 36.2 °C (97.2 °F) (Temporal)   Resp 16   Ht 1.575 m (5' 2\")   Wt 88.5 kg (195 lb)   LMP 01/01/1998   SpO2 97%   BMI 35.67 kg/m²   Reviewed and hypertensive  Constitutional: Well developed, Well nourished,Appears to be in pain and is holding her neck stiffly.  HENT: Normocephalic, atraumatic, bilateral external ears normal, No intraoral erythema, edema, exudate  Eyes: PERRLA, conjunctiva pink, no scleral icterus.   Cardiovascular: Regular rate and rhythm. No murmurs, rubs or gallops.  No dependent edema or calf tenderness  Respiratory: Lungs clear to auscultation bilaterally. No wheezes, rales, or rhonchi.  Abdominal:  Abdomen soft, non-tender, non distended. No rebound, or guarding.    Skin: No erythema, no rash. No wounds or bruising.  Genitourinary: No costovertebral angle tenderness.   Musculoskeletal: right paraspinous tenderness to her neck.   Neurologic: Wrist extension, flexion, finger abduction, and thumb opposition, biceps, triceps and shoulder abduction are 5/5 bilaterally.   Extensor hallucis longus and ankle plantar flexion are " symmetric. Sensation is intact on the pad of the first and fifth finger, over the first dorsal web space of the hand, And over the deltoid.  Sensation is intact to light touch in both legs. Strength is limited by pain bilaterally.   Psychiatric: Affect normal, Judgment normal, Mood normal.     RADIOLOGY  I have independently interpreted the C-spine CT scan associated with this visit demonstrating no obvious fracture.  I am awaiting the final reading from the radiologist.     Final Radiology Report  CT-CSPINE WITHOUT PLUS RECONS   Final Result      No acute fracture or dislocation seen in the CT scan of the cervical spine.        Radiologist interpretation have been reviewed by me.     COURSE & MEDICAL DECISION MAKING  12:14 PM - Patient seen and examined at bedside for neck pain. Discussed the plan of care with the patient including providing the patient with medications as well as ordering imaging to further evaluate the patient's condition. The patient was able to ask questions at this time. Patient agrees with the plan of care. Ordered CT-spine without plus recons.      INTERVENTIONS BY ME:  Medications   morphine 4 MG/ML injection 4 mg (has no administration in time range)   ondansetron (Zofran) syringe/vial injection 4 mg (has no administration in time range)   ketorolac (Toradol) 15 MG/ML injection 15 mg (has no administration in time range)     Patient reported that the pain was tolerable after interventions.  She had less neck stiffness.    12:58 PM - On reassessment response to intervention the patient appears improved. I discussed the patient's diagnostic study results with the patient. I discussed plan for discharge and follow up as outlined below. I recommend that the patient take Ibuprofen 600 mg every six hours or Naproxen 440 mg every 8-12 hours for seven days. The patient is stable for discharge at this time and will return for any weakness, fever, headache or any other new or worsening symptoms.  Patient verbalizes understanding and support with my plan for discharge.     ASSESSMENT, COURSE AND PLAN:  PROBLEMS EVALUATED THIS VISIT:    This patient presents with neck pain after injuring it rolling over in bed.  She felt a pop in the neck.  She definitely has some muscle spasm.  There is no evidence of radiculopathy or myelopathy on exam.  On CT imaging there is no evidence of fracture or dislocation.  This is likely a strain with muscle spasm.    Measures: Prescription monitoring queried and score 40  Opiate risk tool utilized and patient low risk  Informed consent obtained for opiate analgesic  Indication opiate analgesic neck pain      DISPOSITION AND DISCUSSIONS    RISK:  High given need for IV opiate analgesics.     PLAN:  New Prescriptions    METHOCARBAMOL (ROBAXIN) 750 MG TAB    Take 2 Tablets by mouth 4 times a day as needed (muscle spasm).    OXYCODONE-ACETAMINOPHEN (PERCOCET) 5-325 MG TAB    Take 1 Tablet by mouth every 6 hours as needed (moderate to severe pain) for up to 4 days.     Ibuprofen and acetaminophen for mild pain    Neck pain handout given    Return for weakness, fever or headache     Followup:  Cat Schaffer M.D.  [address] [facility] [id number]  [phone]    Schedule an appointment as soon as possible for a visit in 5 days  As needed      CONDITION: Good.     FINAL IMPRESSION  1. Neck pain          Mariah CABRERA (Scribe), am scribing for, and in the presence of, James Driver M.D..    Electronically signed by: Mariah Arzate (Scribe), 1/5/2025    James CABRERA M.D. personally performed the services described in this documentation, as scribed by Mariah Arzate in my presence, and it is both accurate and complete.     The note accurately reflects work and decisions made by me.  James Driver M.D.  1/5/2025  2:42 PM

## 2025-01-05 NOTE — DISCHARGE INSTRUCTIONS
Neck Injury  (Cervical Strain, Care After)    Take ibuprofen 600 mg mg every 6 hours or naproxen 440 mg every 8-12 hours for 7 days unless it upsets the stomach.  Return for weakness or fever and headache.  See your doctor if not better in 7-10 days.  Take Robaxin for spasm and Percocet for severe pain.    You have a cervical strain. The muscles and ligaments in your neck have been stretched. The bones are not broken.    HOME CARE INSTRUCTIONS  While awake, apply ice packs to the neck or areas of pain about every 1-2 hours, for 20 to 30 minutes at a time. Do this for 2 days or as directed. If you were given a cervical collar for support, ask your caregiver if you may remove it for bathing or applying ice.  After 2 days, you may apply heat to the area for 20 to 30 minutes, 4 to 5 times a day.  If given a Hyde collar, wear as instructed. Do not remove any collar unless instructed by a caregiver.  Take prescribed medication as directed. You may use ibuprofen (Advil® or Motrin®) and acetaminophen (Tylenol®) for discomfort. Only use these if your caregiver has not given medications that interfere    Recheck with the hospital or clinic after a radiologist has read your x-rays. Recheck with the hospital or clinic to make sure the initial readings are correct. Do this also to determine if you need further studies. It is your responsibility to find out your x-ray results. X-rays are sometimes repeated in one week to ten days. These are often repeated to make sure that a hairline fracture was not overlooked. Ask your caregiver how you are to find out about your radiology (x-ray) results.    seek immediate medical attention IF:  You develop difficulties swallowing or breathing.  You have numbness, weakness or movement problems in you or your arms or legs.  You develop increasing pain which is uncontrolled with medications.    AGREEMENT BETWEEN PATIENT AND HEALTHCARE TEAM:  Your signature on this document represents an  understanding between you and the healthcare team that took care of you today.  That means that you:  Understand these discharge instructions.   Will monitor your condition.  Will seek immediate medical attention as instructed.    Document Released: 12/18/2006  Document Re-Released: 06/30/2008  [employer]® Patient Information ©2008 [employer].

## 2025-01-22 ENCOUNTER — OFFICE VISIT (OUTPATIENT)
Dept: CARDIOLOGY | Facility: MEDICAL CENTER | Age: 55
End: 2025-01-22
Attending: INTERNAL MEDICINE
Payer: MEDICARE

## 2025-01-22 VITALS
HEIGHT: 62 IN | BODY MASS INDEX: 35.48 KG/M2 | DIASTOLIC BLOOD PRESSURE: 66 MMHG | SYSTOLIC BLOOD PRESSURE: 100 MMHG | RESPIRATION RATE: 16 BRPM | WEIGHT: 192.8 LBS | HEART RATE: 81 BPM | OXYGEN SATURATION: 95 %

## 2025-01-22 DIAGNOSIS — R07.89 ATYPICAL CHEST PAIN: ICD-10-CM

## 2025-01-22 PROCEDURE — 99214 OFFICE O/P EST MOD 30 MIN: CPT | Performed by: INTERNAL MEDICINE

## 2025-01-22 PROCEDURE — 99211 OFF/OP EST MAY X REQ PHY/QHP: CPT | Performed by: INTERNAL MEDICINE

## 2025-01-22 PROCEDURE — 3078F DIAST BP <80 MM HG: CPT | Performed by: INTERNAL MEDICINE

## 2025-01-22 PROCEDURE — 3074F SYST BP LT 130 MM HG: CPT | Performed by: INTERNAL MEDICINE

## 2025-01-22 ASSESSMENT — ENCOUNTER SYMPTOMS
BLURRED VISION: 0
EYE PAIN: 0
SHORTNESS OF BREATH: 0
FEVER: 0
SPEECH CHANGE: 0
ABDOMINAL PAIN: 0
BLOOD IN STOOL: 0
SENSORY CHANGE: 0
CLAUDICATION: 0
DEPRESSION: 0
DOUBLE VISION: 0
COUGH: 0
NAUSEA: 0
HEADACHES: 0
PALPITATIONS: 0
PND: 0
HALLUCINATIONS: 0
FALLS: 0
ORTHOPNEA: 0
WEIGHT LOSS: 0
MYALGIAS: 0
EYE DISCHARGE: 0
CHILLS: 0
LOSS OF CONSCIOUSNESS: 0
BRUISES/BLEEDS EASILY: 0
DIZZINESS: 0
VOMITING: 0

## 2025-01-22 ASSESSMENT — FIBROSIS 4 INDEX: FIB4 SCORE: 0.87

## 2025-01-22 NOTE — PROGRESS NOTES
Chief Complaint   Patient presents with    Follow-Up     Dx: Angina pectoris, unspecified (HCC)      Bradycardia       Subjective     Gina Scott is a 54 y.o. female who presents today for cardiac care and evaluation for chest pain which has resolved.    Gina Scott does not have any history of heart attack arrhythmias in the past. she never had transthoracic echocardiogram, cardiac catheterization or ablations procedure in the past.    I have personally interpreted EKG today with patient, there is no evidence of acute coronary syndrome, no evidence of prior infarct, normal DE and QT interval, no significant conduction disease. Sinus rhythm.    09/2024 I have independently interpreted and reviewed echocardiogram's actual images with patient which showed normal left ventricular systolic function. No wall motion abnormality. No evidence of pulmonary hypertension. No significant valvular disease.    No family history of sudden cardiac death.    Both parents had heart disease.    She did have a negative nuclear stress test in 2022. I personally interpreted and reviewed the images with her in clinic today.    Past Medical History:   Diagnosis Date    Asthma     Bronchitis     Chickenpox     Chronic back pain 11/24/2009    ENDOMETRIOSIS 11/24/2009    Grave's disease 11/24/2009    Hypothyroid 11/24/2009    Hypothyroid     Hypothyroidism     Influenza     Kidney stones     Mild intermittent asthma without complication 1/13/2017    Osteoporosis     PND (post-nasal drip)     Seizure disorder (HCC)     Sleep apnea     Spinal cord stimulator status     Tonsillitis     Venereal disease     Vitamin d deficiency 11/24/2009     Past Surgical History:   Procedure Laterality Date    ABDOMINAL HYSTERECTOMY TOTAL      ovarian  cyst endometriosis    APPENDECTOMY      CHOLECYSTECTOMY      LAMINOTOMY      fusion L5    LAPAROTOMY      OTHER ORTHOPEDIC SURGERY      fusion     Family History   Problem Relation  Age of Onset    Hypertension Mother     Diabetes Mother     Hypertension Father     Diabetes Father     Cancer Father     Cancer Maternal Aunt         aunt and cousin have breast cancer    No Known Problems Sister     No Known Problems Brother     No Known Problems Son     No Known Problems Son     No Known Problems Son     Genetic Disorder Neg Hx     Lung Disease Neg Hx     Sleep Apnea Neg Hx      Social History     Socioeconomic History    Marital status:      Spouse name: Not on file    Number of children: Not on file    Years of education: Not on file    Highest education level: Not on file   Occupational History    Not on file   Tobacco Use    Smoking status: Former     Current packs/day: 0.00     Average packs/day: 0.2 packs/day for 22.0 years (3.3 ttl pk-yrs)     Types: Cigarettes     Start date: 7/16/1992     Quit date: 7/16/2014     Years since quitting: 10.5     Passive exposure: Never    Smokeless tobacco: Never    Tobacco comments:     smoked pack and half a week in august of 2009   Vaping Use    Vaping status: Never Used   Substance and Sexual Activity    Alcohol use: Not Currently     Comment: Once to twice yearly    Drug use: No    Sexual activity: Not on file     Comment:    Other Topics Concern    Not on file   Social History Narrative    Not on file     Social Drivers of Health     Financial Resource Strain: Not on file   Food Insecurity: Not on file   Transportation Needs: Not on file   Physical Activity: Not on file   Stress: Not on file   Social Connections: Not on file   Intimate Partner Violence: Not on file   Housing Stability: Not on file     Allergies   Allergen Reactions    Darvocet [Propoxyphene N-Apap] Anaphylaxis    [place] Swelling     Mouth swells up      Hctz [Hydrochlorothiazide] Shortness of Breath    Pcn [Penicillins] Hives and Shortness of Breath     Tolerated ceftriaxone     Outpatient Encounter Medications as of 1/22/2025   Medication Sig Dispense Refill     levothyroxine (SYNTHROID) 88 MCG Tab Take 88 mcg by mouth every morning on an empty stomach.      nitroglycerin (NITROSTAT) 0.4 MG SL Tab Place 0.4 mg under the tongue every 5 minutes as needed for Chest Pain.      aspirin EC (ECOTRIN) 81 MG Tablet Delayed Response Take 486 mg by mouth one time as needed (Chest pain). 486 mg = 6 tablets      ibuprofen (MOTRIN) 800 MG Tab Take 800 mg by mouth every 8 hours as needed for Mild Pain or Inflammation.      albuterol (VENTOLIN OR PROVENTIL) 108 (90 BASE) MCG/ACT AERS inhalation aerosol Inhale 2 Puffs by mouth every 6 hours as needed for Shortness of Breath. 8.5 g 3    [DISCONTINUED] methocarbamol (ROBAXIN) 750 MG Tab Take 2 Tablets by mouth 4 times a day as needed (muscle spasm). 20 Tablet 0    [DISCONTINUED] ondansetron (ZOFRAN ODT) 4 MG TABLET DISPERSIBLE Take 1 Tablet by mouth every 8 hours as needed for Nausea/Vomiting. 8 Tablet 0    [DISCONTINUED] ADVAIR DISKUS 100-50 MCG/ACT AEROSOL POWDER, BREATH ACTIVATED Inhale 1 Puff 2 times a day. (Patient not taking: Reported on 10/9/2024)       No facility-administered encounter medications on file as of 1/22/2025.     Review of Systems   Constitutional:  Negative for chills, fever, malaise/fatigue and weight loss.   HENT:  Negative for ear discharge, ear pain, hearing loss and nosebleeds.    Eyes:  Negative for blurred vision, double vision, pain and discharge.   Respiratory:  Negative for cough and shortness of breath.    Cardiovascular:  Positive for chest pain. Negative for palpitations, orthopnea, claudication, leg swelling and PND.   Gastrointestinal:  Negative for abdominal pain, blood in stool, melena, nausea and vomiting.   Genitourinary:  Negative for dysuria and hematuria.   Musculoskeletal:  Negative for falls, joint pain and myalgias.   Skin:  Negative for itching and rash.   Neurological:  Negative for dizziness, sensory change, speech change, loss of consciousness and headaches.   Endo/Heme/Allergies:  Negative  "for environmental allergies. Does not bruise/bleed easily.   Psychiatric/Behavioral:  Negative for depression, hallucinations and suicidal ideas.               Objective     /66 (BP Location: Left arm, Patient Position: Sitting, BP Cuff Size: Adult)   Pulse 81   Resp 16   Ht 1.575 m (5' 2\")   Wt 87.5 kg (192 lb 12.8 oz)   LMP 01/01/1998   SpO2 95%   BMI 35.26 kg/m²     Physical Exam  Vitals and nursing note reviewed.   Constitutional:       General: She is not in acute distress.     Appearance: She is not diaphoretic.   HENT:      Head: Normocephalic and atraumatic.      Right Ear: External ear normal.      Left Ear: External ear normal.      Nose: No congestion or rhinorrhea.   Eyes:      General:         Right eye: No discharge.         Left eye: No discharge.   Neck:      Thyroid: No thyromegaly.      Vascular: No JVD.   Cardiovascular:      Rate and Rhythm: Normal rate and regular rhythm.      Pulses: Normal pulses.   Pulmonary:      Effort: No respiratory distress.   Abdominal:      General: There is no distension.      Tenderness: There is no abdominal tenderness.   Musculoskeletal:         General: No swelling or tenderness.      Right lower leg: No edema.      Left lower leg: No edema.   Skin:     General: Skin is warm and dry.   Neurological:      Mental Status: She is alert and oriented to person, place, and time.      Cranial Nerves: No cranial nerve deficit.   Psychiatric:         Behavior: Behavior normal.                Assessment & Plan     1. Atypical chest pain              Medical Decision Making: Today's Assessment/Status/Plan:   Await CTA of coronaries for further evaluation, already scheduled from last visit.    Ok to proceed with EGD.    This visit encounter signifies the visit complexity inherent to evaluation and management associated with medical care services that serve as the continuing focal point for all needed health care services and/or with medical care services that are " part of ongoing care related to this patient's single, serious condition, complex cardiac condition.    Zeb Cervantes M.D.

## 2025-01-22 NOTE — LETTER
PROCEDURE/SURGERY CLEARANCE FORM      Encounter Date: 1/22/2025    Patient: Gina Scott  YOB: 1970    CARDIOLOGIST:  Zeb Cervantes M.D.    REFERRING DOCTOR:  No ref. provider found    PATIENT does not have  PPM.    PATIENT does not have  AICD.    The above patient is cleared to have the following procedure/surgery: yes                                           Additional comments: Ok to proceed with EGD.                 MD Geo Cervantes M.D.

## 2025-04-10 ENCOUNTER — RESULTS FOLLOW-UP (OUTPATIENT)
Dept: CARDIOLOGY | Facility: MEDICAL CENTER | Age: 55
End: 2025-04-10
Payer: MEDICARE

## 2025-04-10 ENCOUNTER — HOSPITAL ENCOUNTER (OUTPATIENT)
Dept: RADIOLOGY | Facility: MEDICAL CENTER | Age: 55
End: 2025-04-10
Attending: INTERNAL MEDICINE
Payer: MEDICARE

## 2025-04-10 VITALS — HEART RATE: 65 BPM | SYSTOLIC BLOOD PRESSURE: 117 MMHG | DIASTOLIC BLOOD PRESSURE: 58 MMHG

## 2025-04-10 DIAGNOSIS — I20.9 ANGINA PECTORIS, UNSPECIFIED (HCC): ICD-10-CM

## 2025-04-10 PROCEDURE — 700117 HCHG RX CONTRAST REV CODE 255: Performed by: INTERNAL MEDICINE

## 2025-04-10 PROCEDURE — 75574 CT ANGIO HRT W/3D IMAGE: CPT

## 2025-04-10 PROCEDURE — 700102 HCHG RX REV CODE 250 W/ 637 OVERRIDE(OP): Mod: UD

## 2025-04-10 PROCEDURE — A9270 NON-COVERED ITEM OR SERVICE: HCPCS | Mod: UD

## 2025-04-10 RX ORDER — NITROGLYCERIN 0.4 MG/1
0.4 TABLET SUBLINGUAL ONCE
Status: COMPLETED | OUTPATIENT
Start: 2025-04-10 | End: 2025-04-10

## 2025-04-10 RX ORDER — NITROGLYCERIN 0.4 MG/1
TABLET SUBLINGUAL
Status: COMPLETED
Start: 2025-04-10 | End: 2025-04-10

## 2025-04-10 RX ADMIN — NITROGLYCERIN 0.4 MG: 0.4 TABLET SUBLINGUAL at 08:36

## 2025-04-10 RX ADMIN — IOHEXOL 60 ML: 350 INJECTION, SOLUTION INTRAVENOUS at 15:00

## 2025-04-10 NOTE — PROGRESS NOTES
Patient had CCTA.  Patient brought to preparation room.   Verbal consent given by patient for PIV and administration of Nitroglycerin by RN.  PIV initiated, 20G right AC.    Review of medications, protocol, and NPO status completed.   Education provided to patient regarding examination.  Patient given baseline 3 lead EKG:   Vitals: 0810  BP: 122/61  HR: SR 66  RR: 16   97% RA     Patient ready for CT scan  Vitals: 0820  BP: 123/65  HR: SR 69  RR: 18  96% RA    Administration of Sublingual Nitroglycerin given per CCTA protocol at 0836--See MAR  Vitals: 0836  BP: 117/58  HR: SR 65  RR: 14  98% RA    Vitals: 0845  BP: 116/56  HR: SR 82  RR: 18  92% RA    Patient returned to preparation area where post procedure education given. PIV removed, patient provided with water.     Vitals returned to baseline. Patient states they are ready to go home. Discharge education provided. Discharged per protocol.     Patient is ambulatory, escorted by RN to [facility].